# Patient Record
Sex: MALE | Race: WHITE | Employment: OTHER | URBAN - METROPOLITAN AREA
[De-identification: names, ages, dates, MRNs, and addresses within clinical notes are randomized per-mention and may not be internally consistent; named-entity substitution may affect disease eponyms.]

---

## 2018-03-01 ENCOUNTER — HOSPITAL ENCOUNTER (INPATIENT)
Dept: SURGICAL ICU | Age: 78
LOS: 13 days | Discharge: NURSING, REHAB OR LONG-TERM CARE | DRG: 377 | End: 2018-03-14
Attending: EMERGENCY MEDICINE | Admitting: INTERNAL MEDICINE
Payer: MEDICARE

## 2018-03-01 DIAGNOSIS — A41.9 SEPTIC SHOCK (HCC): ICD-10-CM

## 2018-03-01 DIAGNOSIS — E87.20 LACTIC ACIDOSIS: ICD-10-CM

## 2018-03-01 DIAGNOSIS — K62.5 RECTAL BLEEDING: Primary | ICD-10-CM

## 2018-03-01 DIAGNOSIS — J18.9 PNEUMONIA DUE TO ORGANISM: ICD-10-CM

## 2018-03-01 DIAGNOSIS — N17.9 AKI (ACUTE KIDNEY INJURY) (HCC): ICD-10-CM

## 2018-03-01 DIAGNOSIS — R57.1 HYPOVOLEMIC SHOCK (HCC): ICD-10-CM

## 2018-03-01 DIAGNOSIS — R65.21 SEPTIC SHOCK (HCC): ICD-10-CM

## 2018-03-01 LAB
AADO2: 258.4 MMHG
ABO/RH: NORMAL
ALBUMIN SERPL-MCNC: 2.3 G/DL (ref 3.5–5.2)
ALBUMIN SERPL-MCNC: 3.4 G/DL (ref 3.5–5.2)
ALP BLD-CCNC: 45 U/L (ref 40–129)
ALP BLD-CCNC: 58 U/L (ref 40–129)
ALT SERPL-CCNC: 11 U/L (ref 0–40)
ALT SERPL-CCNC: 17 U/L (ref 0–40)
ANION GAP SERPL CALCULATED.3IONS-SCNC: 11 MMOL/L (ref 7–16)
ANION GAP SERPL CALCULATED.3IONS-SCNC: 22 MMOL/L (ref 7–16)
ANISOCYTOSIS: ABNORMAL
ANTIBODY SCREEN: NORMAL
AST SERPL-CCNC: 18 U/L (ref 0–39)
AST SERPL-CCNC: 21 U/L (ref 0–39)
B.E.: -16.7 MMOL/L (ref -3–3)
BACTERIA: ABNORMAL /HPF
BASOPHILS ABSOLUTE: 0.03 E9/L (ref 0–0.2)
BASOPHILS ABSOLUTE: 0.05 E9/L (ref 0–0.2)
BASOPHILS RELATIVE PERCENT: 0.2 % (ref 0–2)
BASOPHILS RELATIVE PERCENT: 0.2 % (ref 0–2)
BETA-HYDROXYBUTYRATE: 0.5 MMOL/L (ref 0.02–0.27)
BILIRUB SERPL-MCNC: 0.3 MG/DL (ref 0–1.2)
BILIRUB SERPL-MCNC: 0.3 MG/DL (ref 0–1.2)
BILIRUBIN URINE: NEGATIVE
BLOOD BANK DISPENSE STATUS: NORMAL
BLOOD BANK PRODUCT CODE: NORMAL
BLOOD, URINE: ABNORMAL
BPU ID: NORMAL
BUN BLDV-MCNC: 86 MG/DL (ref 8–23)
BUN BLDV-MCNC: 94 MG/DL (ref 8–23)
BURR CELLS: ABNORMAL
BURR CELLS: ABNORMAL
CALCIUM SERPL-MCNC: 6.4 MG/DL (ref 8.6–10.2)
CALCIUM SERPL-MCNC: 8.4 MG/DL (ref 8.6–10.2)
CASTS: ABNORMAL /LPF
CHLORIDE BLD-SCNC: 108 MMOL/L (ref 98–107)
CHLORIDE BLD-SCNC: 92 MMOL/L (ref 98–107)
CHLORIDE URINE RANDOM: <20 MMOL/L
CK MB: 6.7 NG/ML (ref 0–7.7)
CLARITY: CLEAR
CO2: 14 MMOL/L (ref 22–29)
CO2: 14 MMOL/L (ref 22–29)
COHB: 0.3 % (ref 0–1.5)
COLOR: YELLOW
CORTISOL TOTAL: 22 MCG/DL (ref 2.68–18.4)
CREAT SERPL-MCNC: 1.8 MG/DL (ref 0.7–1.2)
CREAT SERPL-MCNC: 2.3 MG/DL (ref 0.7–1.2)
CREATININE URINE: 90 MG/DL (ref 40–278)
CRITICAL: ABNORMAL
DATE ANALYZED: ABNORMAL
DATE OF COLLECTION: ABNORMAL
DESCRIPTION BLOOD BANK: NORMAL
EKG ATRIAL RATE: 92 BPM
EKG P AXIS: 78 DEGREES
EKG P-R INTERVAL: 166 MS
EKG Q-T INTERVAL: 328 MS
EKG QRS DURATION: 98 MS
EKG QTC CALCULATION (BAZETT): 405 MS
EKG R AXIS: 36 DEGREES
EKG T AXIS: 111 DEGREES
EKG VENTRICULAR RATE: 92 BPM
EOSINOPHILS ABSOLUTE: 0 E9/L (ref 0.05–0.5)
EOSINOPHILS ABSOLUTE: 0 E9/L (ref 0.05–0.5)
EOSINOPHILS RELATIVE PERCENT: 0 % (ref 0–6)
EOSINOPHILS RELATIVE PERCENT: 0 % (ref 0–6)
FIO2: 100 %
GFR AFRICAN AMERICAN: 34
GFR AFRICAN AMERICAN: 45
GFR NON-AFRICAN AMERICAN: 28 ML/MIN/1.73
GFR NON-AFRICAN AMERICAN: 37 ML/MIN/1.73
GLUCOSE BLD-MCNC: 195 MG/DL (ref 74–109)
GLUCOSE BLD-MCNC: 93 MG/DL (ref 74–109)
GLUCOSE URINE: NEGATIVE MG/DL
HCO3: 14.4 MMOL/L (ref 22–26)
HCT VFR BLD CALC: 33.2 % (ref 37–54)
HCT VFR BLD CALC: 34.9 % (ref 37–54)
HCT VFR BLD CALC: 42.3 % (ref 37–54)
HEMOGLOBIN: 11.3 G/DL (ref 12.5–16.5)
HEMOGLOBIN: 11.5 G/DL (ref 12.5–16.5)
HEMOGLOBIN: 14.1 G/DL (ref 12.5–16.5)
HHB: 0.5 % (ref 0–5)
IMMATURE GRANULOCYTES #: 0.07 E9/L
IMMATURE GRANULOCYTES #: 0.18 E9/L
IMMATURE GRANULOCYTES %: 0.5 % (ref 0–5)
IMMATURE GRANULOCYTES %: 0.7 % (ref 0–5)
INR BLD: 1.7
INR BLD: 1.8
KETONES, URINE: NEGATIVE MG/DL
LAB: ABNORMAL
LACTIC ACID: 0.9 MMOL/L (ref 0.5–2.2)
LACTIC ACID: 2.2 MMOL/L (ref 0.5–2.2)
LACTIC ACID: 5.1 MMOL/L (ref 0.5–2.2)
LEUKOCYTE ESTERASE, URINE: NEGATIVE
LIPASE: 10 U/L (ref 13–60)
LYMPHOCYTES ABSOLUTE: 1.16 E9/L (ref 1.5–4)
LYMPHOCYTES ABSOLUTE: 1.68 E9/L (ref 1.5–4)
LYMPHOCYTES RELATIVE PERCENT: 6.2 % (ref 20–42)
LYMPHOCYTES RELATIVE PERCENT: 7.5 % (ref 20–42)
Lab: 1730
MCH RBC QN AUTO: 31.3 PG (ref 26–35)
MCH RBC QN AUTO: 32.3 PG (ref 26–35)
MCH RBC QN AUTO: 32.8 PG (ref 26–35)
MCHC RBC AUTO-ENTMCNC: 33 % (ref 32–34.5)
MCHC RBC AUTO-ENTMCNC: 33.3 % (ref 32–34.5)
MCHC RBC AUTO-ENTMCNC: 34 % (ref 32–34.5)
MCV RBC AUTO: 94.8 FL (ref 80–99.9)
MCV RBC AUTO: 96.2 FL (ref 80–99.9)
MCV RBC AUTO: 96.8 FL (ref 80–99.9)
METER GLUCOSE: 78 MG/DL (ref 70–110)
METHB: 0.6 % (ref 0–1.5)
MODE: AC
MONOCYTES ABSOLUTE: 0.35 E9/L (ref 0.1–0.95)
MONOCYTES ABSOLUTE: 1.86 E9/L (ref 0.1–0.95)
MONOCYTES RELATIVE PERCENT: 2.3 % (ref 2–12)
MONOCYTES RELATIVE PERCENT: 6.8 % (ref 2–12)
NEUTROPHILS ABSOLUTE: 13.85 E9/L (ref 1.8–7.3)
NEUTROPHILS ABSOLUTE: 23.54 E9/L (ref 1.8–7.3)
NEUTROPHILS RELATIVE PERCENT: 86.1 % (ref 43–80)
NEUTROPHILS RELATIVE PERCENT: 89.5 % (ref 43–80)
NITRITE, URINE: NEGATIVE
O2 CONTENT: 17.9 ML/DL
O2 SATURATION: 99.5 % (ref 92–98.5)
O2HB: 98.6 % (ref 94–97)
OPERATOR ID: ABNORMAL
OVALOCYTES: ABNORMAL
OVALOCYTES: ABNORMAL
PATIENT TEMP: 37 C
PCO2: 57.8 MMHG (ref 35–45)
PDW BLD-RTO: 13 FL (ref 11.5–15)
PDW BLD-RTO: 13.5 FL (ref 11.5–15)
PDW BLD-RTO: 14 FL (ref 11.5–15)
PEEP/CPAP: 5 CMH?O
PFO2: 3.72 MMHG/%
PH BLOOD GAS: 7.01 (ref 7.35–7.45)
PH UA: 6 (ref 5–9)
PLATELET # BLD: 141 E9/L (ref 130–450)
PLATELET # BLD: 157 E9/L (ref 130–450)
PLATELET # BLD: 246 E9/L (ref 130–450)
PMV BLD AUTO: 11.7 FL (ref 7–12)
PMV BLD AUTO: 11.8 FL (ref 7–12)
PMV BLD AUTO: 11.8 FL (ref 7–12)
PO2: 371.8 MMHG (ref 60–100)
POIKILOCYTES: ABNORMAL
POIKILOCYTES: ABNORMAL
POLYCHROMASIA: ABNORMAL
POLYCHROMASIA: ABNORMAL
POTASSIUM SERPL-SCNC: 4.9 MMOL/L (ref 3.5–5)
POTASSIUM SERPL-SCNC: 5.5 MMOL/L (ref 3.5–5)
POTASSIUM, UR: 27.7 MMOL/L
PROTEIN UA: 30 MG/DL
PROTHROMBIN TIME: 19.1 SEC (ref 9.3–12.4)
PROTHROMBIN TIME: 20 SEC (ref 9.3–12.4)
RBC # BLD: 3.45 E12/L (ref 3.8–5.8)
RBC # BLD: 3.68 E12/L (ref 3.8–5.8)
RBC # BLD: 4.37 E12/L (ref 3.8–5.8)
RBC UA: ABNORMAL /HPF (ref 0–2)
RENAL EPITHELIAL, UA: ABNORMAL /HPF
RI(T): 70 %
RR MECHANICAL: 14 B/MIN
SODIUM BLD-SCNC: 128 MMOL/L (ref 132–146)
SODIUM BLD-SCNC: 133 MMOL/L (ref 132–146)
SODIUM URINE: <20 MMOL/L
SOURCE, BLOOD GAS: ABNORMAL
SPECIFIC GRAVITY UA: 1.01 (ref 1–1.03)
THB: 12.2 G/DL (ref 11.5–16.5)
TIME ANALYZED: 1735
TOTAL CK: 263 U/L (ref 20–200)
TOTAL PROTEIN: 4.5 G/DL (ref 6.4–8.3)
TOTAL PROTEIN: 6.6 G/DL (ref 6.4–8.3)
TROPONIN: <0.01 NG/ML (ref 0–0.03)
TROPONIN: <0.01 NG/ML (ref 0–0.03)
UROBILINOGEN, URINE: 0.2 E.U./DL
VT MECHANICAL: 500 ML
WBC # BLD: 15.5 E9/L (ref 4.5–11.5)
WBC # BLD: 21.6 E9/L (ref 4.5–11.5)
WBC # BLD: 27.3 E9/L (ref 4.5–11.5)
WBC UA: ABNORMAL /HPF (ref 0–5)

## 2018-03-01 PROCEDURE — P9035 PLATELET PHERES LEUKOREDUCED: HCPCS

## 2018-03-01 PROCEDURE — 86850 RBC ANTIBODY SCREEN: CPT

## 2018-03-01 PROCEDURE — 82570 ASSAY OF URINE CREATININE: CPT

## 2018-03-01 PROCEDURE — P9016 RBC LEUKOCYTES REDUCED: HCPCS

## 2018-03-01 PROCEDURE — 9990 CHARGE CONVERSION

## 2018-03-01 PROCEDURE — 86900 BLOOD TYPING SEROLOGIC ABO: CPT

## 2018-03-01 PROCEDURE — 99291 CRITICAL CARE FIRST HOUR: CPT | Performed by: SURGERY

## 2018-03-01 PROCEDURE — 81003 URINALYSIS AUTO W/O SCOPE: CPT

## 2018-03-01 PROCEDURE — 87798 DETECT AGENT NOS DNA AMP: CPT

## 2018-03-01 PROCEDURE — 36556 INSERT NON-TUNNEL CV CATH: CPT

## 2018-03-01 PROCEDURE — 80053 COMPREHEN METABOLIC PANEL: CPT

## 2018-03-01 PROCEDURE — 86901 BLOOD TYPING SEROLOGIC RH(D): CPT

## 2018-03-01 PROCEDURE — 84300 ASSAY OF URINE SODIUM: CPT

## 2018-03-01 PROCEDURE — 82436 ASSAY OF URINE CHLORIDE: CPT

## 2018-03-01 PROCEDURE — 84484 ASSAY OF TROPONIN QUANT: CPT

## 2018-03-01 PROCEDURE — 96367 TX/PROPH/DG ADDL SEQ IV INF: CPT

## 2018-03-01 PROCEDURE — 05HN33Z INSERTION OF INFUSION DEVICE INTO LEFT INTERNAL JUGULAR VEIN, PERCUTANEOUS APPROACH: ICD-10-PCS | Performed by: INTERNAL MEDICINE

## 2018-03-01 PROCEDURE — 82550 ASSAY OF CK (CPK): CPT

## 2018-03-01 PROCEDURE — 82962 GLUCOSE BLOOD TEST: CPT

## 2018-03-01 PROCEDURE — 86920 COMPATIBILITY TEST SPIN: CPT

## 2018-03-01 PROCEDURE — 94799 UNLISTED PULMONARY SVC/PX: CPT

## 2018-03-01 PROCEDURE — 82533 TOTAL CORTISOL: CPT

## 2018-03-01 PROCEDURE — 94002 VENT MGMT INPAT INIT DAY: CPT

## 2018-03-01 PROCEDURE — 87486 CHLMYD PNEUM DNA AMP PROBE: CPT

## 2018-03-01 PROCEDURE — 74176 CT ABD & PELVIS W/O CONTRAST: CPT

## 2018-03-01 PROCEDURE — 84133 ASSAY OF URINE POTASSIUM: CPT

## 2018-03-01 PROCEDURE — 85025 COMPLETE CBC W/AUTO DIFF WBC: CPT

## 2018-03-01 PROCEDURE — 96365 THER/PROPH/DIAG IV INF INIT: CPT

## 2018-03-01 PROCEDURE — C9113 INJ PANTOPRAZOLE SODIUM, VIA: HCPCS

## 2018-03-01 PROCEDURE — 71045 X-RAY EXAM CHEST 1 VIEW: CPT

## 2018-03-01 PROCEDURE — 36430 TRANSFUSION BLD/BLD COMPNT: CPT

## 2018-03-01 PROCEDURE — 85610 PROTHROMBIN TIME: CPT

## 2018-03-01 PROCEDURE — 87502 INFLUENZA DNA AMP PROBE: CPT

## 2018-03-01 PROCEDURE — 87581 M.PNEUMON DNA AMP PROBE: CPT

## 2018-03-01 PROCEDURE — 96375 TX/PRO/DX INJ NEW DRUG ADDON: CPT

## 2018-03-01 PROCEDURE — P9059 PLASMA, FRZ BETWEEN 8-24HOUR: HCPCS

## 2018-03-01 PROCEDURE — 99285 EMERGENCY DEPT VISIT HI MDM: CPT

## 2018-03-01 PROCEDURE — 85027 COMPLETE CBC AUTOMATED: CPT

## 2018-03-01 PROCEDURE — 96366 THER/PROPH/DIAG IV INF ADDON: CPT

## 2018-03-01 PROCEDURE — 82805 BLOOD GASES W/O2 SATURATION: CPT

## 2018-03-01 PROCEDURE — 87503 INFLUENZA DNA AMP PROB ADDL: CPT

## 2018-03-01 PROCEDURE — 36415 COLL VENOUS BLD VENIPUNCTURE: CPT

## 2018-03-01 PROCEDURE — 93005 ELECTROCARDIOGRAM TRACING: CPT

## 2018-03-01 PROCEDURE — 81015 MICROSCOPIC EXAM OF URINE: CPT

## 2018-03-01 PROCEDURE — 5A1955Z RESPIRATORY VENTILATION, GREATER THAN 96 CONSECUTIVE HOURS: ICD-10-PCS | Performed by: EMERGENCY MEDICINE

## 2018-03-01 PROCEDURE — 83605 ASSAY OF LACTIC ACID: CPT

## 2018-03-01 PROCEDURE — 94760 N-INVAS EAR/PLS OXIMETRY 1: CPT

## 2018-03-01 PROCEDURE — 0BH17EZ INSERTION OF ENDOTRACHEAL AIRWAY INTO TRACHEA, VIA NATURAL OR ARTIFICIAL OPENING: ICD-10-PCS | Performed by: EMERGENCY MEDICINE

## 2018-03-01 PROCEDURE — 82553 CREATINE MB FRACTION: CPT

## 2018-03-01 PROCEDURE — 87088 URINE BACTERIA CULTURE: CPT

## 2018-03-01 PROCEDURE — 31500 INSERT EMERGENCY AIRWAY: CPT

## 2018-03-01 PROCEDURE — 82010 KETONE BODYS QUAN: CPT

## 2018-03-01 PROCEDURE — 83690 ASSAY OF LIPASE: CPT

## 2018-03-01 PROCEDURE — 87040 BLOOD CULTURE FOR BACTERIA: CPT

## 2018-03-01 RX ORDER — PANTOPRAZOLE SODIUM 40 MG/10ML
40 INJECTION, POWDER, LYOPHILIZED, FOR SOLUTION INTRAVENOUS ONCE
Status: COMPLETED | OUTPATIENT
Start: 2018-03-01 | End: 2018-03-01

## 2018-03-01 RX ORDER — 0.9 % SODIUM CHLORIDE 0.9 %
1000 INTRAVENOUS SOLUTION INTRAVENOUS ONCE
Status: COMPLETED | OUTPATIENT
Start: 2018-03-01 | End: 2018-03-01

## 2018-03-01 RX ORDER — SODIUM CHLORIDE 0.9 % (FLUSH) 0.9 %
10 SYRINGE (ML) INJECTION EVERY 12 HOURS SCHEDULED
Status: DISCONTINUED | OUTPATIENT
Start: 2018-03-01 | End: 2018-03-14 | Stop reason: HOSPADM

## 2018-03-01 RX ORDER — ETOMIDATE 2 MG/ML
INJECTION INTRAVENOUS
Status: COMPLETED
Start: 2018-03-01 | End: 2018-03-01

## 2018-03-01 RX ORDER — METOPROLOL TARTRATE 50 MG/1
1 TABLET, FILM COATED ORAL 2 TIMES DAILY
COMMUNITY
Start: 2018-02-15

## 2018-03-01 RX ORDER — SODIUM CHLORIDE 9 MG/ML
INJECTION, SOLUTION INTRAVENOUS CONTINUOUS
Status: DISCONTINUED | OUTPATIENT
Start: 2018-03-01 | End: 2018-03-04

## 2018-03-01 RX ORDER — 0.9 % SODIUM CHLORIDE 0.9 %
10 VIAL (ML) INJECTION ONCE
Status: DISCONTINUED | OUTPATIENT
Start: 2018-03-01 | End: 2018-03-01 | Stop reason: SDUPTHER

## 2018-03-01 RX ORDER — MIDAZOLAM HYDROCHLORIDE 1 MG/ML
INJECTION INTRAMUSCULAR; INTRAVENOUS
Status: COMPLETED
Start: 2018-03-01 | End: 2018-03-01

## 2018-03-01 RX ORDER — SIMVASTATIN 80 MG
1 TABLET ORAL DAILY
COMMUNITY
Start: 2018-02-13

## 2018-03-01 RX ORDER — FENTANYL CITRATE 50 UG/ML
100 INJECTION, SOLUTION INTRAMUSCULAR; INTRAVENOUS ONCE
Status: COMPLETED | OUTPATIENT
Start: 2018-03-01 | End: 2018-03-01

## 2018-03-01 RX ORDER — ISOSORBIDE MONONITRATE 60 MG/1
1 TABLET, EXTENDED RELEASE ORAL DAILY
COMMUNITY
Start: 2018-02-08

## 2018-03-01 RX ORDER — MIDAZOLAM HYDROCHLORIDE 1 MG/ML
2 INJECTION INTRAMUSCULAR; INTRAVENOUS ONCE
Status: COMPLETED | OUTPATIENT
Start: 2018-03-01 | End: 2018-03-01

## 2018-03-01 RX ORDER — LISINOPRIL 5 MG/1
1 TABLET ORAL DAILY
COMMUNITY
Start: 2018-02-08

## 2018-03-01 RX ORDER — 0.9 % SODIUM CHLORIDE 0.9 %
250 INTRAVENOUS SOLUTION INTRAVENOUS ONCE
Status: DISCONTINUED | OUTPATIENT
Start: 2018-03-01 | End: 2018-03-02 | Stop reason: SDUPTHER

## 2018-03-01 RX ORDER — SODIUM CHLORIDE 0.9 % (FLUSH) 0.9 %
10 SYRINGE (ML) INJECTION PRN
Status: DISCONTINUED | OUTPATIENT
Start: 2018-03-01 | End: 2018-03-02 | Stop reason: ALTCHOICE

## 2018-03-01 RX ORDER — ONDANSETRON 2 MG/ML
4 INJECTION INTRAMUSCULAR; INTRAVENOUS ONCE
Status: COMPLETED | OUTPATIENT
Start: 2018-03-01 | End: 2018-03-01

## 2018-03-01 RX ORDER — ACETAMINOPHEN 160 MG
1 TABLET,DISINTEGRATING ORAL DAILY
COMMUNITY
End: 2018-04-12 | Stop reason: CLARIF

## 2018-03-01 RX ORDER — PANTOPRAZOLE SODIUM 40 MG/10ML
40 INJECTION, POWDER, LYOPHILIZED, FOR SOLUTION INTRAVENOUS 2 TIMES DAILY
Status: DISCONTINUED | OUTPATIENT
Start: 2018-03-02 | End: 2018-03-02

## 2018-03-01 RX ORDER — FENTANYL CITRATE 50 UG/ML
INJECTION, SOLUTION INTRAMUSCULAR; INTRAVENOUS
Status: COMPLETED
Start: 2018-03-01 | End: 2018-03-01

## 2018-03-01 RX ORDER — 0.9 % SODIUM CHLORIDE 0.9 %
500 INTRAVENOUS SOLUTION INTRAVENOUS ONCE
Status: COMPLETED | OUTPATIENT
Start: 2018-03-01 | End: 2018-03-01

## 2018-03-01 RX ORDER — SUCCINYLCHOLINE CHLORIDE 20 MG/ML
INJECTION INTRAMUSCULAR; INTRAVENOUS
Status: COMPLETED
Start: 2018-03-01 | End: 2018-03-01

## 2018-03-01 RX ORDER — PIPERACILLIN SODIUM, TAZOBACTAM SODIUM 3; .375 G/15ML; G/15ML
3.38 INJECTION, POWDER, LYOPHILIZED, FOR SOLUTION INTRAVENOUS EVERY 12 HOURS
Status: DISCONTINUED | OUTPATIENT
Start: 2018-03-02 | End: 2018-03-03

## 2018-03-01 RX ORDER — ACETAMINOPHEN 325 MG/1
650 TABLET ORAL EVERY 4 HOURS PRN
Status: DISCONTINUED | OUTPATIENT
Start: 2018-03-01 | End: 2018-03-14 | Stop reason: HOSPADM

## 2018-03-01 RX ORDER — PANTOPRAZOLE SODIUM 40 MG/10ML
80 INJECTION, POWDER, LYOPHILIZED, FOR SOLUTION INTRAVENOUS ONCE
Status: DISCONTINUED | OUTPATIENT
Start: 2018-03-01 | End: 2018-03-01 | Stop reason: SDUPTHER

## 2018-03-01 RX ORDER — ONDANSETRON 2 MG/ML
4 INJECTION INTRAMUSCULAR; INTRAVENOUS EVERY 6 HOURS PRN
Status: DISCONTINUED | OUTPATIENT
Start: 2018-03-01 | End: 2018-03-14 | Stop reason: HOSPADM

## 2018-03-01 RX ORDER — FENTANYL CITRATE 50 UG/ML
INJECTION, SOLUTION INTRAMUSCULAR; INTRAVENOUS
Status: DISPENSED
Start: 2018-03-01 | End: 2018-03-02

## 2018-03-01 RX ORDER — LIDOCAINE HYDROCHLORIDE 10 MG/ML
INJECTION, SOLUTION INFILTRATION; PERINEURAL
Status: COMPLETED
Start: 2018-03-01 | End: 2018-03-02

## 2018-03-01 RX ORDER — ACETAMINOPHEN 325 MG/1
650 TABLET ORAL EVERY 4 HOURS PRN
Status: DISCONTINUED | OUTPATIENT
Start: 2018-03-01 | End: 2018-03-01 | Stop reason: SDUPTHER

## 2018-03-01 RX ORDER — ALBUTEROL SULFATE 2.5 MG/3ML
3 SOLUTION RESPIRATORY (INHALATION) 4 TIMES DAILY PRN
COMMUNITY
Start: 2018-02-17

## 2018-03-01 RX ORDER — 0.9 % SODIUM CHLORIDE 0.9 %
1000 INTRAVENOUS SOLUTION INTRAVENOUS ONCE
Status: DISCONTINUED | OUTPATIENT
Start: 2018-03-01 | End: 2018-03-02 | Stop reason: SDUPTHER

## 2018-03-01 RX ORDER — 0.9 % SODIUM CHLORIDE 0.9 %
10 VIAL (ML) INJECTION 2 TIMES DAILY
Status: DISCONTINUED | OUTPATIENT
Start: 2018-03-01 | End: 2018-03-02

## 2018-03-01 RX ADMIN — MIDAZOLAM HYDROCHLORIDE 2 MG: 1 INJECTION INTRAMUSCULAR; INTRAVENOUS at 16:09

## 2018-03-01 RX ADMIN — ONDANSETRON 4 MG: 2 INJECTION INTRAMUSCULAR; INTRAVENOUS at 11:49

## 2018-03-01 RX ADMIN — SODIUM CHLORIDE: 9 INJECTION, SOLUTION INTRAVENOUS at 21:59

## 2018-03-01 RX ADMIN — ETOMIDATE 20 MG: 2 INJECTION INTRAVENOUS at 15:47

## 2018-03-01 RX ADMIN — MIDAZOLAM HYDROCHLORIDE 2 MG: 1 INJECTION INTRAMUSCULAR; INTRAVENOUS at 15:52

## 2018-03-01 RX ADMIN — MIDAZOLAM HYDROCHLORIDE 2 MG: 1 INJECTION INTRAMUSCULAR; INTRAVENOUS at 16:05

## 2018-03-01 RX ADMIN — FENTANYL CITRATE 100 MCG: 50 INJECTION, SOLUTION INTRAMUSCULAR; INTRAVENOUS at 16:13

## 2018-03-01 RX ADMIN — MIDAZOLAM HYDROCHLORIDE 4 MG: 1 INJECTION INTRAMUSCULAR; INTRAVENOUS at 16:16

## 2018-03-01 RX ADMIN — Medication 1000 ML: at 11:49

## 2018-03-01 RX ADMIN — Medication 10 ML: at 21:48

## 2018-03-01 RX ADMIN — Medication 500 ML: at 13:54

## 2018-03-01 RX ADMIN — FENTANYL CITRATE 100 MCG: 50 INJECTION, SOLUTION INTRAMUSCULAR; INTRAVENOUS at 16:48

## 2018-03-01 RX ADMIN — PANTOPRAZOLE SODIUM 40 MG: 40 INJECTION, POWDER, LYOPHILIZED, FOR SOLUTION INTRAVENOUS at 11:49

## 2018-03-01 RX ADMIN — SUCCINYLCHOLINE CHLORIDE 100 MG: 20 INJECTION INTRAMUSCULAR; INTRAVENOUS at 15:48

## 2018-03-01 RX ADMIN — Medication 1000 ML: at 11:59

## 2018-03-01 ASSESSMENT — PAIN SCALES - GENERAL
PAINLEVEL_OUTOF10: 0
PAINLEVEL_OUTOF10: 0

## 2018-03-01 ASSESSMENT — PULMONARY FUNCTION TESTS
PIF_VALUE: 40
PIF_VALUE: 34
PIF_VALUE: 34
PIF_VALUE: 42
PIF_VALUE: 51
PIF_VALUE: 35
PIF_VALUE: 22
PIF_VALUE: 30
PIF_VALUE: 32

## 2018-03-01 NOTE — PROCEDURES
Intubation Procedure Note    Indication: impending airway compromise    Consent: Unable to be obtained due to the emergent nature of this procedure. Medications Used: etomidate intravenously    Procedure: The patient was placed in the appropriate position. Cricoid pressure was not required. Intubation was performed by direct laryngoscopy using a laryngoscope and an 7.5 cuffed endotracheal tube. The cuff was then inflated and the tube was secured appropriately at a distance of 22 cm to the lips. Initial confirmation of placement included bilateral breath sounds, an end tidal CO2 detector, absence of sounds over the stomach, tube fogging, adequate chest rise and adequate pulse oximetry reading. A chest x-ray to verify correct placement of the tube showed appropriate tube position. The patient tolerated the procedure well.      Complications: None

## 2018-03-01 NOTE — ED NOTES
005 [facility] Marlin Nelson RN  03/01/18 3250

## 2018-03-01 NOTE — ED NOTES
PT STILL MOVING DURING CVC PLACEMENT. DR Henson Fought AT BEDSIDE.  ADDITIONAL 2MG VERSED BOLUS ADMINISTERED VIA DRIP     Jed Wolf RN  03/01/18 3618

## 2018-03-01 NOTE — ED PROVIDER NOTES
ED Attending  CC: Yes     Department of Emergency Medicine   ED  Provider Note  Admit Date/RoomTime: 3/1/2018  6:17 PM  ED [facility]   Chief Complaint   Rectal Bleeding (started last night, multiple times t/o night. passing clots) and Loss of Consciousness (wife states that he passed out in the bathroom ths am)    History of Present Illness   Source of history provided by:  patient and spouse/SO. History/Exam Limitations: none. Musa Valero is a 69 y.o. old male who  has no past medical history on file. , presents to the emergency department by private vehicle, for possible GI bleeding as evidenced by bright red blood per rectum and clots, which began 5 hour(s) prior to arrival.  Symptoms are associated with Syncopal episode several hours ago. Since onset the symptoms have been intermittent and moderate in severity. If Vomiting, Then:     []  Bright Red Blood by Emesis     []  Streaking of Blood     []  Coffee Ground Appearance        If Stools, Then:    [x]  Bright Red Blood     []  Streaking of Blood     []  [place] / [place] Kate     []  Blood on Tissue Paper     Recent Use of:     []  Ethanol Use     []  NSAID's Use     []  Steroid Use   History of:    []  GI Bleeding     []  Peptic Ulcer Disease     []  Esophageal Varicies     []  Liver Disease       []  Aortic Graft Surgery     []  Coagulopathy     []  Current Anticoagulant Use     ROS    Pertinent positives and negatives are stated within HPI, all other systems reviewed and are negative. No past surgical history on file. Social History:    Family History: family history is not on file. Allergies: Patient has no known allergies. Physical Exam           ED Triage Vitals [03/01/18 1058]   BP Temp Temp Source Pulse Resp SpO2 Height Weight   -- 96.8 °F (36 °C) Temporal 94 16 93 % -- 185 lb (83.9 kg)      Oxygen Saturation Interpretation: Normal.    Constitutional:  Alert, development consistent with age.   Eyes:  PERRL, EOMI, no discharge or Influenza A Virus H3       Result: Not Detected  *  *  Normal Range: Not Detected      Film Array Metapneumovirus       Result: Not Detected  *  *  Normal Range: Not Detected      Film Array Parainfluenza Virus 1       Result: Not Detected  *  *  Normal Range: Not Detected      Film Array Parainfluenza Virus 2       Result: Not Detected  *  *  Normal Range: Not Detected      Film Array Parainfluenza Virus 3       Result: Not Detected  *  *  Normal Range: Not Detected      Film Array Parainfluenza Virus 4       Result: Not Detected  *  *  Normal Range: Not Detected      Film Array Respiratory Syncitial Virus       Result: Not Detected  *  *  Normal Range: Not Detected      Film Array Rhinovirus/Enterovirus       Result: Not Detected  *  *  Normal Range: Not Detected      Film Array Bordetella Pertusis       Result: Not Detected  *  *  Normal Range: Not Detected      Film Array Chlamydophilia Pneumoniae       Result: Not Detected  *  *  Normal Range: Not Detected      Film Array Mycoplasma Pneumoniae       Result: Not Detected  *  *  Normal Range: Not Detected      Organism FILM ARR Influenza B Virus Detected (A)    CBC Auto Differential   Result Value Ref Range    WBC 27.3 (H) 4.5 - 11.5 E9/L    RBC 4.37 3.80 - 5.80 E12/L    Hemoglobin 14.1 12.5 - 16.5 g/dL    Hematocrit 42.3 37.0 - 54.0 %    MCV 96.8 80.0 - 99.9 fL    MCH 32.3 26.0 - 35.0 pg    MCHC 33.3 32.0 - 34.5 %    RDW 13.0 11.5 - 15.0 fL    Platelets 167 389 - 367 E9/L    MPV 11.8 7.0 - 12.0 fL    Neutrophils % 86.1 (H) 43.0 - 80.0 %    Immature Granulocytes % 0.7 0.0 - 5.0 %    Lymphocytes % 6.2 (L) 20.0 - 42.0 %    Monocytes % 6.8 2.0 - 12.0 %    Eosinophils % 0.0 0.0 - 6.0 %    Basophils % 0.2 0.0 - 2.0 %    Neutrophils # 23.54 (H) 1.80 - 7.30 E9/L    Immature Granulocytes # 0.18 E9/L    Lymphocytes # 1.68 1.50 - 4.00 E9/L    Monocytes # 1.86 (H) 0.10 - 0.95 E9/L    Eosinophils # 0.00 (L) 0.05 - 0.50 E9/L    Basophils # 0.05 0.00 - 0.20 E9/L Poikilocytes 2+     Rod Cells 2+     Ovalocytes 1+    Blood Gas, Arterial   Result Value Ref Range    Date Analyzed [id number]     Time Analyzed 1735     Source: Blood Arterial     pH, Blood Gas 7.014 (LL) 7.350 - 7.450    PCO2 57.8 (H) 35.0 - 45.0 mmHg    PO2 371.8 (H) 60.0 - 100.0 mmHg    HCO3 14.4 (L) 22.0 - 26.0 mmol/L    B.E. -16.7 (L) -3.0 - 3.0 mmol/L    O2 Sat 99.5 (H) 92.0 - 98.5 %    PO2/FIO2 3.72 mmHg/%    AaDO2 258.4 mmHg    RI(T) 70 %    O2Hb 98.6 (H) 94.0 - 97.0 %    COHb 0.3 0.0 - 1.5 %    MetHb 0.6 0.0 - 1.5 %    O2 Content 17.9 mL/dL    HHb 0.5 0.0 - 5.0 %    tHb (est) 12.2 11.5 - 16.5 g/dL    Mode AC     FIO2 100.0 %    Rr Mechanical 14.0 b/min    Vt Mechanical 500 mL    Peep/Cpap 5.0 cmH? O    Date Of Collection [id number]     Time Collected 1730     Pt Temp 37.0 C     ID [id number]     Lab [id number]     Critical(s) Notified Handed report to Dr/RN    Magnesium   Result Value Ref Range    Magnesium 2.1 1.6 - 2.6 mg/dL   Phosphorus   Result Value Ref Range    Phosphorus 3.1 2.5 - 4.5 mg/dL   Comprehensive Metabolic Panel w/ Reflex to MG   Result Value Ref Range    Sodium 138 132 - 146 mmol/L    Potassium reflex Magnesium 4.7 3.5 - 5.0 mmol/L    Chloride 112 (H) 98 - 107 mmol/L    CO2 15 (L) 22 - 29 mmol/L    Anion Gap 11 7 - 16 mmol/L    Glucose 117 (H) 74 - 109 mg/dL    BUN 72 (H) 8 - 23 mg/dL    CREATININE 1.3 (H) 0.7 - 1.2 mg/dL    GFR Non-African American 54 >=60 mL/min/1.73    GFR African American >60     Calcium 6.3 (L) 8.6 - 10.2 mg/dL    Total Protein 3.7 (L) 6.4 - 8.3 g/dL    Alb 2.1 (L) 3.5 - 5.2 g/dL    Total Bilirubin 0.5 0.0 - 1.2 mg/dL    Alkaline Phosphatase 31 (L) 40 - 129 U/L    ALT 12 0 - 40 U/L    AST 18 0 - 39 U/L   CBC auto differential   Result Value Ref Range    WBC 15.9 (H) 4.5 - 11.5 E9/L    RBC 2.49 (L) 3.80 - 5.80 E12/L    Hemoglobin 7.6 (L) 12.5 - 16.5 g/dL    Hematocrit 23.3 (L) 37.0 - 54.0 %    MCV 93.6 80.0 - 99.9 fL    MCH 30.5 26.0 - 35.0 pg    MCHC 32.6 32.0 - 34.5 % RDW 14.6 11.5 - 15.0 fL    Platelets 159 (L) 902 - 450 E9/L    MPV 11.5 7.0 - 12.0 fL    Neutrophils % 73.0 43.0 - 80.0 %    Immature Granulocytes % 0.5 0.0 - 5.0 %    Lymphocytes % 17.0 (L) 20.0 - 42.0 %    Monocytes % 9.4 2.0 - 12.0 %    Eosinophils % 0.0 0.0 - 6.0 %    Basophils % 0.1 0.0 - 2.0 %    Neutrophils # 11.64 (H) 1.80 - 7.30 E9/L    Immature Granulocytes # 0.08 E9/L    Lymphocytes # 2.70 1.50 - 4.00 E9/L    Monocytes # 1.49 (H) 0.10 - 0.95 E9/L    Eosinophils # 0.00 (L) 0.05 - 0.50 E9/L    Basophils # 0.01 0.00 - 0.20 E9/L    Anisocytosis 1+     Poikilocytes 1+     [place]offey Cells 1+    Protime-INR   Result Value Ref Range    Protime 20.0 (H) 9.3 - 12.4 sec    INR 1.8    Lactic acid, plasma   Result Value Ref Range    Lactic Acid 0.9 0.5 - 2.2 mmol/L   Comprehensive Metabolic Panel   Result Value Ref Range    Sodium 133 132 - 146 mmol/L    Potassium 4.9 3.5 - 5.0 mmol/L    Chloride 108 (H) 98 - 107 mmol/L    CO2 14 (L) 22 - 29 mmol/L    Anion Gap 11 7 - 16 mmol/L    Glucose 93 74 - 109 mg/dL    BUN 86 (H) 8 - 23 mg/dL    CREATININE 1.8 (H) 0.7 - 1.2 mg/dL    GFR Non-African American 37 >=60 mL/min/1.73    GFR African American 45     Calcium 6.4 (L) 8.6 - 10.2 mg/dL    Total Protein 4.5 (L) 6.4 - 8.3 g/dL    Alb 2.3 (L) 3.5 - 5.2 g/dL    Total Bilirubin 0.3 0.0 - 1.2 mg/dL    Alkaline Phosphatase 45 40 - 129 U/L    ALT 11 0 - 40 U/L    AST 18 0 - 39 U/L   Cortisol   Result Value Ref Range    Cortisol 22.00 (H) 2.68 - 18.40 mcg/dL   Urinalysis   Result Value Ref Range    Color, UA Yellow Straw/Yellow    Clarity, UA Clear Clear    Glucose, Ur Negative Negative mg/dL    Bilirubin Urine Negative Negative    Ketones, Urine Negative Negative mg/dL    Specific Gravity, UA 1.015 1.005 - 1.030    Blood, Urine LARGE (A) Negative    pH, UA 6.0 5.0 - 9.0    Protein, UA 30 (A) Negative mg/dL    Urobilinogen, Urine 0.2 <2.0 E.U./dL    Nitrite, Urine Negative Negative    Leukocyte Esterase, Urine Negative Negative (est) 10.1 (L) 11.5 - 16.5 g/dL    Mode AC     FIO2 60.0 %    Rr Mechanical 25.0 b/min    Vt Mechanical 550 mL    Peep/Cpap 5.0 cmH? O    Date Of Collection [id number]     Time Collected 0110     Pt Temp 37.0 C     ID [id number]     Lab [id number]     Critical(s) Notified . No Critical Values    Protime-INR   Result Value Ref Range    Protime 17.7 (H) 9.3 - 12.4 sec    INR 1.6    TEG lab test   Result Value Ref Range    R (Reaction Time) 4.3 (L) 5.0 - 10.0 min    K (Clotting Time) 1.4 1.0 - 3.0 min    Angle (Clot Strength) 70.5 59.0 - 74.0 degree    MA (Max Amplitude) 63.2 50.0 - 70.0 mm    G-TEG 8.6 4.5 - 11.0 K d/sc    EPL-TEG 0.2 0.0 - 15.0 %    LY30 (Fibrinolysis) 0.2 0.0 - 8.0 %   Fibrinogen   Result Value Ref Range    Fibrinogen 357 225 - 540 mg/dL   D-dimer, quantitative   Result Value Ref Range    D-Dimer, Quant 2963 ng/mL DDU   Haptoglobin   Result Value Ref Range    Haptoglobin 82 30 - 200 mg/dL   Blood Gas, Arterial   Result Value Ref Range    Date Analyzed [id number]     Time Analyzed 0645     Source: Blood Arterial     pH, Blood Gas 7.245 (L) 7.350 - 7.450    PCO2 36.9 35.0 - 45.0 mmHg    PO2 115.8 (H) 60.0 - 100.0 mmHg    HCO3 15.6 (L) 22.0 - 26.0 mmol/L    B.E. -10.8 (L) -3.0 - 3.0 mmol/L    O2 Sat 97.9 92.0 - 98.5 %    PO2/FIO2 2.89 mmHg/%    AaDO2 117.0 mmHg    RI(T) 1.01     O2Hb 97.3 (H) 94.0 - 97.0 %    COHb 0.3 0.0 - 1.5 %    MetHb 0.3 0.0 - 1.5 %    O2 Content 12.1 mL/dL    HHb 2.1 0.0 - 5.0 %    tHb (est) 8.7 (L) 11.5 - 16.5 g/dL    Mode AC     FIO2 40.0 %    Rr Mechanical 25.0 b/min    Vt Mechanical 550 mL    Peep/Cpap 5.0 cmH? O    Date Of Collection [id number]     Time Collected 0642     Pt Temp 37.0 C     ID [id number]     Lab [id number]     Critical(s) Notified .  No Critical Values    Blood Gas, Arterial   Result Value Ref Range    Date Analyzed [id number]     Time Analyzed 0658     Source: Blood Arterial     pH, Blood Gas 7.234 (L) 7.350 - 7.450    PCO2 33.5 (L) 35.0 - 45.0 mmHg    PO2 139.9 (H) 60.0 - 100.0 mmHg    HCO3 13.8 (L) 22.0 - 26.0 mmol/L    B.E. -12.6 (L) -3.0 - 3.0 mmol/L    O2 Sat 98.5 92.0 - 98.5 %    PO2/FIO2 3.50 mmHg/%    AaDO2 96.8 mmHg    RI(T) 0.69     O2Hb 97.8 (H) 94.0 - 97.0 %    COHb 0.3 0.0 - 1.5 %    MetHb 0.4 0.0 - 1.5 %    O2 Content 12.8 mL/dL    HHb 1.5 0.0 - 5.0 %    tHb (est) 9.1 (L) 11.5 - 16.5 g/dL    Mode AC     FIO2 40.0 %    Rr Mechanical 25.0 b/min    Vt Mechanical 550 mL    Peep/Cpap 5.0 cmH? O    Date Of Collection [id number]     Time Collected 0655     Pt Temp 37.0 C     ID [id number]     Lab [id number]     Critical(s) Notified .  No Critical Values    CBC Auto Differential   Result Value Ref Range    WBC 13.0 (H) 4.5 - 11.5 E9/L    RBC 2.71 (L) 3.80 - 5.80 E12/L    Hemoglobin 8.6 (L) 12.5 - 16.5 g/dL    Hematocrit 25.0 (L) 37.0 - 54.0 %    MCV 92.3 80.0 - 99.9 fL    MCH 31.7 26.0 - 35.0 pg    MCHC 34.4 32.0 - 34.5 %    RDW 14.6 11.5 - 15.0 fL    Platelets 93 (L) 033 - 450 E9/L    MPV 11.8 7.0 - 12.0 fL    Neutrophils % 76.0 43.0 - 80.0 %    Immature Granulocytes % 0.5 0.0 - 5.0 %    Lymphocytes % 14.9 (L) 20.0 - 42.0 %    Monocytes % 8.6 2.0 - 12.0 %    Eosinophils % 0.0 0.0 - 6.0 %    Basophils % 0.0 0.0 - 2.0 %    Neutrophils # 9.84 (H) 1.80 - 7.30 E9/L    Immature Granulocytes # 0.07 E9/L    Lymphocytes # 1.93 1.50 - 4.00 E9/L    Monocytes # 1.12 (H) 0.10 - 0.95 E9/L    Eosinophils # 0.00 (L) 0.05 - 0.50 E9/L    Basophils # 0.00 0.00 - 0.20 E9/L    Anisocytosis 1+     Poikilocytes 1+     [place] Cells 1+    Platelet Confirmation   Result Value Ref Range    Platelet Confirmation CONFIRMED    Hemoglobin and hematocrit, blood   Result Value Ref Range    Hemoglobin 8.7 (L) 12.5 - 16.5 g/dL    Hematocrit 25.2 (L) 37.0 - 54.0 %   Protime-INR   Result Value Ref Range    Protime 15.8 (H) 9.3 - 12.4 sec    INR 1.4    Blood Gas, Arterial   Result Value Ref Range    Date Analyzed [id number]     Time Analyzed 0824     Source: Blood Arterial     pH, Blood Gas 7.280 (L) 7.350 - 7.450    PCO2 Bank [id number]     Description Blood Bank Plasma, 5 Day, Thawed     Unit Number [id number]     Dispense Status Blood Bank transfused    PREPARE FRESH FROZEN PLASMA Number of Units: 1   Result Value Ref Range    Product Code Blood Bank [id number]     Description Blood Bank Plasma, 5 Day, Thawed     Unit Number [id number]     Dispense Status Blood Bank issued      Imaging: All Radiology results interpreted by Radiologist unless otherwise noted. XR CHEST PORTABLE   Final Result   1. Left internal jugular central venous catheter tip in the SVC. 2. No pneumothorax on the right or left. 3. Endotracheal tube in good position. 4. NG tube in good position. 5. Discrete subpleural areas of atelectasis or increased density in   the posterior costophrenic sulcus of both lower lobes. 6. No perihilar vascular congestion. XR Chest Abdomen Ng Placement   Final Result   NG tube in good position in the fundal region of the stomach, although   partially coiled. XR CHEST PORTABLE   Final Result   1. Vague, ill-defined groundglass opacity towards the right parahilar   region peripherally located. Can represent a pulmonary nodule. Further   evaluation with a CT scan of the chest, which can be done initially   without IV contrast, is recommended. 2. No superimposed acute cardiopulmonary process. 3. Good position for the endotracheal tube. 4. There is no pneumothorax on the right or on the left. ALERT:  THIS IS AN ABNORMAL REPORT. XR CHEST PORTABLE   Final Result   1. Endotracheal tube in good position just proximal to the upper   contour of the arch of the aorta. 2. NG tube in good position. 3. No acute cardiopulmonary process. XR Chest Abdomen Ng Placement   Final Result   Nonspecific bowel gas pattern with a OG tube in the stomach. CT ABDOMEN PELVIS WO CONTRAST Additional Contrast? None   Final Result      1. Motion artifacts.  Suboptimally infusion ( Intravenous Rate/Dose Change 3/2/18 0800)   oseltamivir (TAMIFLU) capsule 30 mg (30 mg Oral Not Given 3/2/18 0815)   octreotide (SANDOSTATIN) 500 mcg in sodium chloride 0.9 % 100 mL infusion (25 mcg/hr Intravenous New Bag 3/2/18 0656)   fentaNYL 5 mcg/mL in D5W 250 mL infusion (100 mcg/hr Intravenous Given 3/2/18 0718)   cisatracurium besylate (NIMBEX) 200 mg in sodium chloride 0.9 % 100 mL infusion (2 mcg/kg/min × 89.2 kg Intravenous New Bag 3/2/18 0827)   calcium gluconate 2 g in dextrose 5 % 50 mL IVPB (not administered)   lubrifresh P.M. (artificial tears) ophthalmic ointment (not administered)   chlorhexidine (PERIDEX) 0.12 % solution 15 mL (not administered)   0.9 % sodium chloride bolus (0 mLs Intravenous Stopped 3/1/18 1355)   pantoprazole (PROTONIX) injection 40 mg (40 mg Intravenous Given 3/1/18 1149)   ondansetron (ZOFRAN) injection 4 mg (4 mg Intravenous Given 3/1/18 1149)   0.9 % sodium chloride bolus (0 mLs Intravenous Stopped 3/1/18 1355)   piperacillin-tazobactam (ZOSYN) 3.375 g in dextrose 5 % 100 mL IVPB (mini-bag) (0 g Intravenous Stopped 3/1/18 1530)   0.9 % sodium chloride bolus (0 mLs Intravenous Stopped 3/1/18 1705)   vancomycin (VANCOCIN) 1,250 mg in sodium chloride 0.9 % 250 mL IVPB (0 mg Intravenous Stopped 3/1/18 2000)   etomidate (AMIDATE) 2 MG/ML injection (20 mg Intravenous Given 3/1/18 1547)   succinylcholine (ANECTINE) 20 MG/ML injection (100 mg Intravenous Given 3/1/18 1548)   midazolam (VERSED) 2 MG/2ML injection (2 mg Intravenous Given 3/1/18 1552)   midazolam (VERSED) injection 2 mg (2 mg Intravenous Given 3/1/18 1605)   midazolam (VERSED) 2 MG/2ML injection (4 mg  Given 3/1/18 1616)   midazolam (VERSED) injection 2 mg (2 mg Intravenous Given 3/1/18 1609)   fentaNYL (SUBLIMAZE) injection 100 mcg (100 mcg Intravenous Given 3/1/18 1613)   fentaNYL (SUBLIMAZE) injection 100 mcg (100 mcg Intravenous Given 3/1/18 1648)   desmopressin (DDAVP) 25.16 mcg in sodium chloride 0.9 related to septic shock given the white count however he did come in for initial complaint of bright red blood per rectum so we ordered 2 units of trauma blood and Resident attempted right femoral line however the patient continued to decompensate while laying flat from a respiratory standpoint and we felt the need to intubate. Patient was intubated using rapid sequence intubation without complication. See resident's procedure note for intubation. We then attempted a right IJ triple-lumen catheter. Resident struck carotid artery and we immediately withdrew and applied pressure for 5 minutes. We then attempted the right IJ once again however we had to contact wires and went through 3 central line children. We could not thread wire appropriately although we were apparently cannulating the vessel. We then aborted triple-lumen and had nurses place another peripheral IV while we set up for introducer to the left femoral vein. Areas were prepped under sterile conditions. There was dark nonpulsatile blood flow over the left femoral line placed. This initially withdrew and flushed easily however on reevaluation this was positional and also became kinked internally. We are unable to use this line any further. I feel it would be in the patient's best interest to get to the ICU for further evaluation and management as soon as possible. I discussed patient care with Surgery, Intensivist, and admitting hospitalist.    Counseling:   I have spoken with the spouse and patient and discussed todays results, in addition to providing specific details for the plan of care and counseling regarding the diagnosis and prognosis and are agreeable with the plan. Assessment      1. Rectal bleeding    2. DEIDRE (acute kidney injury) ([facility] [address]    3. Lactic acidosis    4. Pneumonia due to organism    5.  Septic shock ([address]      This patient's ED course included: a personal history and physicial examination, re-evaluation prior to disposition,

## 2018-03-01 NOTE — ED NOTES
INTRODUCER NOT WORKING, DR Consuelo Roblero AT [facility] PULLED AND SANDBAG SALTY [place]brahim, [place]  03/01/18 8027

## 2018-03-01 NOTE — ED NOTES
Pt intubated with 7.5 22 at lip initial end title was [id number] Maicol Vásquez, RN  03/01/18 115 [facility] Alayna Beltran, [place]  03/01/18 7399

## 2018-03-02 PROBLEM — K92.1 MELENA: Status: ACTIVE | Noted: 2018-03-02

## 2018-03-02 LAB
% INHIBITION AA: 47.8 %
% INHIBITION ADP: 38 %
AADO2: 117 MMHG
AADO2: 148.7 MMHG
AADO2: 370.1 MMHG
AADO2: 56.4 MMHG
AADO2: 74.2 MMHG
AADO2: 96.8 MMHG
ALBUMIN SERPL-MCNC: 1.6 G/DL (ref 3.5–5.2)
ALBUMIN SERPL-MCNC: 2.1 G/DL (ref 3.5–5.2)
ALP BLD-CCNC: 23 U/L (ref 40–129)
ALP BLD-CCNC: 31 U/L (ref 40–129)
ALT SERPL-CCNC: 12 U/L (ref 0–40)
ALT SERPL-CCNC: 9 U/L (ref 0–40)
ANGLE (CLOT STRENGTH): 64.9 DEGREE (ref 59–74)
ANGLE (CLOT STRENGTH): 67.9 DEGREE (ref 59–74)
ANGLE (CLOT STRENGTH): 68 DEGREE (ref 59–74)
ANGLE (CLOT STRENGTH): 70.5 DEGREE (ref 59–74)
ANION GAP SERPL CALCULATED.3IONS-SCNC: 11 MMOL/L (ref 7–16)
ANION GAP SERPL CALCULATED.3IONS-SCNC: 8 MMOL/L (ref 7–16)
ANION GAP SERPL CALCULATED.3IONS-SCNC: 8 MMOL/L (ref 7–16)
ANISOCYTOSIS: ABNORMAL
APTT: 34.1 SEC (ref 24.5–35.1)
AST SERPL-CCNC: 12 U/L (ref 0–39)
AST SERPL-CCNC: 18 U/L (ref 0–39)
B.E.: -10.8 MMOL/L (ref -3–3)
B.E.: -12.6 MMOL/L (ref -3–3)
B.E.: -4.4 MMOL/L (ref -3–3)
B.E.: -6.2 MMOL/L (ref -3–3)
B.E.: -9.7 MMOL/L (ref -3–3)
B.E.: -9.8 MMOL/L (ref -3–3)
BASOPHILS ABSOLUTE: 0 E9/L (ref 0–0.2)
BASOPHILS ABSOLUTE: 0 E9/L (ref 0–0.2)
BASOPHILS ABSOLUTE: 0.01 E9/L (ref 0–0.2)
BASOPHILS RELATIVE PERCENT: 0 % (ref 0–2)
BASOPHILS RELATIVE PERCENT: 0.1 % (ref 0–2)
BASOPHILS RELATIVE PERCENT: 0.1 % (ref 0–2)
BILIRUB SERPL-MCNC: 0.5 MG/DL (ref 0–1.2)
BILIRUB SERPL-MCNC: 1.4 MG/DL (ref 0–1.2)
BLOOD BANK DISPENSE STATUS: NORMAL
BLOOD BANK PRODUCT CODE: NORMAL
BPU ID: NORMAL
BUN BLDV-MCNC: 53 MG/DL (ref 8–23)
BUN BLDV-MCNC: 60 MG/DL (ref 8–23)
BUN BLDV-MCNC: 72 MG/DL (ref 8–23)
BURR CELLS: ABNORMAL
CALCIUM IONIZED: 0.94 MMOL/L (ref 1.15–1.33)
CALCIUM IONIZED: 0.96 MMOL/L (ref 1.15–1.33)
CALCIUM IONIZED: 0.98 MMOL/L (ref 1.15–1.33)
CALCIUM SERPL-MCNC: 5.6 MG/DL (ref 8.6–10.2)
CALCIUM SERPL-MCNC: 6.1 MG/DL (ref 8.6–10.2)
CALCIUM SERPL-MCNC: 6.3 MG/DL (ref 8.6–10.2)
CHLORIDE BLD-SCNC: 108 MMOL/L (ref 98–107)
CHLORIDE BLD-SCNC: 108 MMOL/L (ref 98–107)
CHLORIDE BLD-SCNC: 112 MMOL/L (ref 98–107)
CK MB: 5.1 NG/ML (ref 0–7.7)
CO2: 15 MMOL/L (ref 22–29)
CO2: 19 MMOL/L (ref 22–29)
CO2: 20 MMOL/L (ref 22–29)
COHB: 0.2 % (ref 0–1.5)
COHB: 0.3 % (ref 0–1.5)
COHB: 0.5 % (ref 0–1.5)
COHB: 1.1 % (ref 0–1.5)
CREAT SERPL-MCNC: 1 MG/DL (ref 0.7–1.2)
CREAT SERPL-MCNC: 1.2 MG/DL (ref 0.7–1.2)
CREAT SERPL-MCNC: 1.3 MG/DL (ref 0.7–1.2)
CRITICAL: ABNORMAL
D DIMER: 2963 NG/ML DDU
DATE ANALYZED: ABNORMAL
DATE OF COLLECTION: ABNORMAL
DESCRIPTION BLOOD BANK: NORMAL
EOSINOPHILS ABSOLUTE: 0 E9/L (ref 0.05–0.5)
EOSINOPHILS RELATIVE PERCENT: 0 % (ref 0–6)
EPL-TEG: 0 % (ref 0–15)
EPL-TEG: 0 % (ref 0–15)
EPL-TEG: 0.2 % (ref 0–15)
EPL-TEG: 0.5 % (ref 0–15)
FIBRINOGEN: 357 MG/DL (ref 225–540)
FILM ARRAY ADENOVIRUS: ABNORMAL
FILM ARRAY BORDETELLA PERTUSSIS: ABNORMAL
FILM ARRAY CHLAMYDOPHILIA PNEUMONIAE: ABNORMAL
FILM ARRAY CORONAVIRUS 229E: ABNORMAL
FILM ARRAY CORONAVIRUS HKU1: ABNORMAL
FILM ARRAY CORONAVIRUS NL63: ABNORMAL
FILM ARRAY CORONAVIRUS OC43: ABNORMAL
FILM ARRAY INFLUENZA A VIRUS 09H1: ABNORMAL
FILM ARRAY INFLUENZA A VIRUS H1: ABNORMAL
FILM ARRAY INFLUENZA A VIRUS H3: ABNORMAL
FILM ARRAY INFLUENZA A VIRUS: ABNORMAL
FILM ARRAY METAPNEUMOVIRUS: ABNORMAL
FILM ARRAY MYCOPLASMA PNEUMONIAE: ABNORMAL
FILM ARRAY PARAINFLUENZA VIRUS 1: ABNORMAL
FILM ARRAY PARAINFLUENZA VIRUS 2: ABNORMAL
FILM ARRAY PARAINFLUENZA VIRUS 3: ABNORMAL
FILM ARRAY PARAINFLUENZA VIRUS 4: ABNORMAL
FILM ARRAY RESPIRATORY SYNCITIAL VIRUS: ABNORMAL
FILM ARRAY RHINOVIRUS/ENTEROVIRUS: ABNORMAL
FIO2: 40 %
FIO2: 60 %
FIO2: 80 %
G-TEG: 7 K D/SC (ref 4.5–11)
G-TEG: 7.3 K D/SC (ref 4.5–11)
G-TEG: 7.4 K D/SC (ref 4.5–11)
G-TEG: 8.6 K D/SC (ref 4.5–11)
GFR AFRICAN AMERICAN: >60
GFR NON-AFRICAN AMERICAN: 54 ML/MIN/1.73
GFR NON-AFRICAN AMERICAN: 59 ML/MIN/1.73
GFR NON-AFRICAN AMERICAN: >60 ML/MIN/1.73
GLUCOSE BLD-MCNC: 117 MG/DL (ref 74–109)
GLUCOSE BLD-MCNC: 319 MG/DL (ref 74–109)
GLUCOSE BLD-MCNC: 376 MG/DL (ref 74–109)
HAPTOGLOBIN: 82 MG/DL (ref 30–200)
HCO3: 13.8 MMOL/L (ref 22–26)
HCO3: 15.4 MMOL/L (ref 22–26)
HCO3: 15.6 MMOL/L (ref 22–26)
HCO3: 16.2 MMOL/L (ref 22–26)
HCO3: 19 MMOL/L (ref 22–26)
HCO3: 19.5 MMOL/L (ref 22–26)
HCT VFR BLD CALC: 16.6 % (ref 37–54)
HCT VFR BLD CALC: 17.4 % (ref 37–54)
HCT VFR BLD CALC: 23.3 % (ref 37–54)
HCT VFR BLD CALC: 24.6 % (ref 37–54)
HCT VFR BLD CALC: 25 % (ref 37–54)
HCT VFR BLD CALC: 25.2 % (ref 37–54)
HCT VFR BLD CALC: 25.5 % (ref 37–54)
HEMOGLOBIN: 6 G/DL (ref 12.5–16.5)
HEMOGLOBIN: 6 G/DL (ref 12.5–16.5)
HEMOGLOBIN: 7.6 G/DL (ref 12.5–16.5)
HEMOGLOBIN: 8.4 G/DL (ref 12.5–16.5)
HEMOGLOBIN: 8.6 G/DL (ref 12.5–16.5)
HEMOGLOBIN: 8.7 G/DL (ref 12.5–16.5)
HEMOGLOBIN: 8.9 G/DL (ref 12.5–16.5)
HHB: 0.7 % (ref 0–5)
HHB: 0.9 % (ref 0–5)
HHB: 1 % (ref 0–5)
HHB: 1.4 % (ref 0–5)
HHB: 1.5 % (ref 0–5)
HHB: 2.1 % (ref 0–5)
IMMATURE GRANULOCYTES #: 0.07 E9/L
IMMATURE GRANULOCYTES #: 0.08 E9/L
IMMATURE GRANULOCYTES %: 0.5 % (ref 0–5)
IMMATURE GRANULOCYTES %: 0.5 % (ref 0–5)
INR BLD: 1.4
INR BLD: 1.6
INR BLD: 1.6
INR BLD: 1.7
INR BLD: 1.7
K (CLOTTING TIME): 1.4 MIN (ref 1–3)
K (CLOTTING TIME): 1.6 MIN (ref 1–3)
K (CLOTTING TIME): 1.6 MIN (ref 1–3)
K (CLOTTING TIME): 1.8 MIN (ref 1–3)
LAB: 9558
LAB: ABNORMAL
LACTATE DEHYDROGENASE: 192 U/L (ref 135–225)
LACTIC ACID: 2.4 MMOL/L (ref 0.5–2.2)
LY30 (FIBRINOLYSIS): 0 % (ref 0–8)
LY30 (FIBRINOLYSIS): 0 % (ref 0–8)
LY30 (FIBRINOLYSIS): 0.2 % (ref 0–8)
LY30 (FIBRINOLYSIS): 0.5 % (ref 0–8)
LYMPHOCYTES ABSOLUTE: 0.64 E9/L (ref 1.5–4)
LYMPHOCYTES ABSOLUTE: 1.93 E9/L (ref 1.5–4)
LYMPHOCYTES ABSOLUTE: 2.7 E9/L (ref 1.5–4)
LYMPHOCYTES RELATIVE PERCENT: 14.9 % (ref 20–42)
LYMPHOCYTES RELATIVE PERCENT: 17 % (ref 20–42)
LYMPHOCYTES RELATIVE PERCENT: 8.8 % (ref 20–42)
Lab: 110
Lab: 1355
Lab: 1838
Lab: 642
Lab: 655
Lab: 845
MA (MAX AMPLITUDE): 58.4 MM (ref 50–70)
MA (MAX AMPLITUDE): 59.3 MM (ref 50–70)
MA (MAX AMPLITUDE): 59.8 MM (ref 50–70)
MA (MAX AMPLITUDE): 63.2 MM (ref 50–70)
MA-AA: 36 MM
MA-ACTIVATED: 11.5 MM
MA-ADP: 40.6 MM
MA-TEG BASELINE: 58.4 MM
MAGNESIUM: 1.6 MG/DL (ref 1.6–2.6)
MAGNESIUM: 2.1 MG/DL (ref 1.6–2.6)
MCH RBC QN AUTO: 29.7 PG (ref 26–35)
MCH RBC QN AUTO: 30.2 PG (ref 26–35)
MCH RBC QN AUTO: 30.5 PG (ref 26–35)
MCH RBC QN AUTO: 31.7 PG (ref 26–35)
MCH RBC QN AUTO: 31.9 PG (ref 26–35)
MCHC RBC AUTO-ENTMCNC: 32.6 % (ref 32–34.5)
MCHC RBC AUTO-ENTMCNC: 34.1 % (ref 32–34.5)
MCHC RBC AUTO-ENTMCNC: 34.4 % (ref 32–34.5)
MCHC RBC AUTO-ENTMCNC: 34.5 % (ref 32–34.5)
MCHC RBC AUTO-ENTMCNC: 36.1 % (ref 32–34.5)
MCV RBC AUTO: 86.9 FL (ref 80–99.9)
MCV RBC AUTO: 87.4 FL (ref 80–99.9)
MCV RBC AUTO: 88.3 FL (ref 80–99.9)
MCV RBC AUTO: 92.3 FL (ref 80–99.9)
MCV RBC AUTO: 93.6 FL (ref 80–99.9)
METER GLUCOSE: 172 MG/DL (ref 70–110)
METER GLUCOSE: 210 MG/DL (ref 70–110)
METER GLUCOSE: 252 MG/DL (ref 70–110)
METER GLUCOSE: 314 MG/DL (ref 70–110)
METER GLUCOSE: 328 MG/DL (ref 70–110)
METER GLUCOSE: 367 MG/DL (ref 70–110)
METER GLUCOSE: 91 MG/DL (ref 70–110)
METHB: 0 % (ref 0–1.5)
METHB: 0.3 % (ref 0–1.5)
METHB: 0.4 % (ref 0–1.5)
METHB: 0.7 % (ref 0–1.5)
MODE: AC
MONOCYTES ABSOLUTE: 0.36 E9/L (ref 0.1–0.95)
MONOCYTES ABSOLUTE: 1.12 E9/L (ref 0.1–0.95)
MONOCYTES ABSOLUTE: 1.49 E9/L (ref 0.1–0.95)
MONOCYTES RELATIVE PERCENT: 5.3 % (ref 2–12)
MONOCYTES RELATIVE PERCENT: 8.6 % (ref 2–12)
MONOCYTES RELATIVE PERCENT: 9.4 % (ref 2–12)
NEUTROPHILS ABSOLUTE: 11.64 E9/L (ref 1.8–7.3)
NEUTROPHILS ABSOLUTE: 6.11 E9/L (ref 1.8–7.3)
NEUTROPHILS ABSOLUTE: 9.84 E9/L (ref 1.8–7.3)
NEUTROPHILS RELATIVE PERCENT: 73 % (ref 43–80)
NEUTROPHILS RELATIVE PERCENT: 76 % (ref 43–80)
NEUTROPHILS RELATIVE PERCENT: 86 % (ref 43–80)
O2 CONTENT: 12.1 ML/DL
O2 CONTENT: 12.5 ML/DL
O2 CONTENT: 12.8 ML/DL
O2 CONTENT: 12.8 ML/DL
O2 CONTENT: 14.6 ML/DL
O2 CONTENT: 8.9 ML/DL
O2 SATURATION: 97.9 % (ref 92–98.5)
O2 SATURATION: 98.5 % (ref 92–98.5)
O2 SATURATION: 98.6 % (ref 92–98.5)
O2 SATURATION: 99 % (ref 92–98.5)
O2 SATURATION: 99.1 % (ref 92–98.5)
O2 SATURATION: 99.3 % (ref 92–98.5)
O2HB: 97.3 % (ref 94–97)
O2HB: 97.8 % (ref 94–97)
O2HB: 97.9 % (ref 94–97)
O2HB: 98.7 % (ref 94–97)
OPERATOR ID: 2067
OPERATOR ID: 264
OPERATOR ID: 264
OPERATOR ID: 7490
OPERATOR ID: ABNORMAL
OPERATOR ID: ABNORMAL
ORGANISM: ABNORMAL
PATHOLOGIST REVIEW: NORMAL
PATIENT TEMP: 37 C
PCO2: 31.3 MMHG (ref 35–45)
PCO2: 31.5 MMHG (ref 35–45)
PCO2: 33.5 MMHG (ref 35–45)
PCO2: 35.2 MMHG (ref 35–45)
PCO2: 35.9 MMHG (ref 35–45)
PCO2: 36.9 MMHG (ref 35–45)
PDW BLD-RTO: 13.9 FL (ref 11.5–15)
PDW BLD-RTO: 14.1 FL (ref 11.5–15)
PDW BLD-RTO: 14.3 FL (ref 11.5–15)
PDW BLD-RTO: 14.6 FL (ref 11.5–15)
PDW BLD-RTO: 14.6 FL (ref 11.5–15)
PEEP/CPAP: 5 CMH?O
PFO2: 1.79 MMHG/%
PFO2: 2.89 MMHG/%
PFO2: 3.5 MMHG/%
PFO2: 3.83 MMHG/%
PFO2: 4.2 MMHG/%
PFO2: 4.44 MMHG/%
PH BLOOD GAS: 7.23 (ref 7.35–7.45)
PH BLOOD GAS: 7.25 (ref 7.35–7.45)
PH BLOOD GAS: 7.28 (ref 7.35–7.45)
PH BLOOD GAS: 7.31 (ref 7.35–7.45)
PH BLOOD GAS: 7.34 (ref 7.35–7.45)
PH BLOOD GAS: 7.41 (ref 7.35–7.45)
PHOSPHORUS: 3.1 MG/DL (ref 2.5–4.5)
PHOSPHORUS: 3.4 MG/DL (ref 2.5–4.5)
PLATELET # BLD: 113 E9/L (ref 130–450)
PLATELET # BLD: 57 E9/L (ref 130–450)
PLATELET # BLD: 65 E9/L (ref 130–450)
PLATELET # BLD: 93 E9/L (ref 130–450)
PLATELET # BLD: 97 E9/L (ref 130–450)
PLATELET CONFIRMATION: NORMAL
PMV BLD AUTO: 10.6 FL (ref 7–12)
PMV BLD AUTO: 11.3 FL (ref 7–12)
PMV BLD AUTO: 11.5 FL (ref 7–12)
PMV BLD AUTO: 11.7 FL (ref 7–12)
PMV BLD AUTO: 11.8 FL (ref 7–12)
PO2: 115.8 MMHG (ref 60–100)
PO2: 139.9 MMHG (ref 60–100)
PO2: 143.3 MMHG (ref 60–100)
PO2: 167.8 MMHG (ref 60–100)
PO2: 177.5 MMHG (ref 60–100)
PO2: 229.5 MMHG (ref 60–100)
POIKILOCYTES: ABNORMAL
POIKILOCYTES: ABNORMAL
POTASSIUM REFLEX MAGNESIUM: 4.7 MMOL/L (ref 3.5–5)
POTASSIUM REFLEX MAGNESIUM: 6 MMOL/L (ref 3.5–5)
POTASSIUM SERPL-SCNC: 3.93 MMOL/L (ref 3.3–5.1)
POTASSIUM SERPL-SCNC: 4.1 MMOL/L (ref 3.5–5)
PROTHROMBIN TIME: 15.8 SEC (ref 9.3–12.4)
PROTHROMBIN TIME: 17.2 SEC (ref 9.3–12.4)
PROTHROMBIN TIME: 17.7 SEC (ref 9.3–12.4)
PROTHROMBIN TIME: 18.7 SEC (ref 9.3–12.4)
PROTHROMBIN TIME: 19.4 SEC (ref 9.3–12.4)
R (REACTION TIME): 4 MIN (ref 5–10)
R (REACTION TIME): 4.3 MIN (ref 5–10)
R (REACTION TIME): 5.5 MIN (ref 5–10)
R (REACTION TIME): 6.6 MIN (ref 5–10)
RBC # BLD: 1.88 E12/L (ref 3.8–5.8)
RBC # BLD: 1.99 E12/L (ref 3.8–5.8)
RBC # BLD: 2.49 E12/L (ref 3.8–5.8)
RBC # BLD: 2.71 E12/L (ref 3.8–5.8)
RBC # BLD: 2.83 E12/L (ref 3.8–5.8)
RI(T): 0.32
RI(T): 0.65
RI(T): 0.69
RI(T): 1.01
RI(T): 2.58
RI(T): 44 %
RR MECHANICAL: 25 B/MIN
SODIUM BLD-SCNC: 135 MMOL/L (ref 132–146)
SODIUM BLD-SCNC: 136 MMOL/L (ref 132–146)
SODIUM BLD-SCNC: 138 MMOL/L (ref 132–146)
SOURCE, BLOOD GAS: ABNORMAL
THB: 10.1 G/DL (ref 11.5–16.5)
THB: 6.1 G/DL (ref 11.5–16.5)
THB: 8.7 G/DL (ref 11.5–16.5)
THB: 8.8 G/DL (ref 11.5–16.5)
THB: 9.1 G/DL (ref 11.5–16.5)
THB: 9.1 G/DL (ref 11.5–16.5)
TIME ANALYZED: 113
TIME ANALYZED: 1357
TIME ANALYZED: 1844
TIME ANALYZED: 645
TIME ANALYZED: 658
TIME ANALYZED: 859
TOTAL CK: 259 U/L (ref 20–200)
TOTAL PROTEIN: 2.8 G/DL (ref 6.4–8.3)
TOTAL PROTEIN: 3.7 G/DL (ref 6.4–8.3)
TROPONIN: <0.01 NG/ML (ref 0–0.03)
URINE CULTURE, ROUTINE: NORMAL
VT MECHANICAL: 550 ML
WBC # BLD: 11.2 E9/L (ref 4.5–11.5)
WBC # BLD: 13 E9/L (ref 4.5–11.5)
WBC # BLD: 15.9 E9/L (ref 4.5–11.5)
WBC # BLD: 7.1 E9/L (ref 4.5–11.5)
WBC # BLD: 7.3 E9/L (ref 4.5–11.5)

## 2018-03-02 PROCEDURE — 85576 BLOOD PLATELET AGGREGATION: CPT

## 2018-03-02 PROCEDURE — 83735 ASSAY OF MAGNESIUM: CPT

## 2018-03-02 PROCEDURE — 85018 HEMOGLOBIN: CPT

## 2018-03-02 PROCEDURE — C1760 CLOSURE DEV, VASC: HCPCS

## 2018-03-02 PROCEDURE — 85610 PROTHROMBIN TIME: CPT

## 2018-03-02 PROCEDURE — 36430 TRANSFUSION BLD/BLD COMPNT: CPT

## 2018-03-02 PROCEDURE — 83605 ASSAY OF LACTIC ACID: CPT

## 2018-03-02 PROCEDURE — 03HC33Z INSERTION OF INFUSION DEVICE INTO LEFT RADIAL ARTERY, PERCUTANEOUS APPROACH: ICD-10-PCS | Performed by: INTERNAL MEDICINE

## 2018-03-02 PROCEDURE — 83615 LACTATE (LD) (LDH) ENZYME: CPT

## 2018-03-02 PROCEDURE — P9035 PLATELET PHERES LEUKOREDUCED: HCPCS

## 2018-03-02 PROCEDURE — 0DC98ZZ EXTIRPATION OF MATTER FROM DUODENUM, VIA NATURAL OR ARTIFICIAL OPENING ENDOSCOPIC: ICD-10-PCS | Performed by: SURGERY

## 2018-03-02 PROCEDURE — P9059 PLASMA, FRZ BETWEEN 8-24HOUR: HCPCS

## 2018-03-02 PROCEDURE — 85025 COMPLETE CBC W/AUTO DIFF WBC: CPT

## 2018-03-02 PROCEDURE — 9990 CHARGE CONVERSION

## 2018-03-02 PROCEDURE — 82550 ASSAY OF CK (CPK): CPT

## 2018-03-02 PROCEDURE — 85384 FIBRINOGEN ACTIVITY: CPT

## 2018-03-02 PROCEDURE — 36620 INSERTION CATHETER ARTERY: CPT

## 2018-03-02 PROCEDURE — 84100 ASSAY OF PHOSPHORUS: CPT

## 2018-03-02 PROCEDURE — 84132 ASSAY OF SERUM POTASSIUM: CPT

## 2018-03-02 PROCEDURE — 84484 ASSAY OF TROPONIN QUANT: CPT

## 2018-03-02 PROCEDURE — C1758 CATHETER, URETERAL: HCPCS

## 2018-03-02 PROCEDURE — C9113 INJ PANTOPRAZOLE SODIUM, VIA: HCPCS

## 2018-03-02 PROCEDURE — 83010 ASSAY OF HAPTOGLOBIN QUANT: CPT

## 2018-03-02 PROCEDURE — 36245 INS CATH ABD/L-EXT ART 1ST: CPT

## 2018-03-02 PROCEDURE — 43235 EGD DIAGNOSTIC BRUSH WASH: CPT | Performed by: SURGERY

## 2018-03-02 PROCEDURE — 75774 ARTERY X-RAY EACH VESSEL: CPT

## 2018-03-02 PROCEDURE — 74176 CT ABD & PELVIS W/O CONTRAST: CPT

## 2018-03-02 PROCEDURE — 37244 VASC EMBOLIZE/OCCLUDE BLEED: CPT

## 2018-03-02 PROCEDURE — 85014 HEMATOCRIT: CPT

## 2018-03-02 PROCEDURE — 80053 COMPREHEN METABOLIC PANEL: CPT

## 2018-03-02 PROCEDURE — 82962 GLUCOSE BLOOD TEST: CPT

## 2018-03-02 PROCEDURE — 80048 BASIC METABOLIC PNL TOTAL CA: CPT

## 2018-03-02 PROCEDURE — 85347 COAGULATION TIME ACTIVATED: CPT

## 2018-03-02 PROCEDURE — 36415 COLL VENOUS BLD VENIPUNCTURE: CPT

## 2018-03-02 PROCEDURE — C1769 GUIDE WIRE: HCPCS

## 2018-03-02 PROCEDURE — 36248 INS CATH ABD/L-EXT ART ADDL: CPT

## 2018-03-02 PROCEDURE — 82330 ASSAY OF CALCIUM: CPT

## 2018-03-02 PROCEDURE — P9016 RBC LEUKOCYTES REDUCED: HCPCS

## 2018-03-02 PROCEDURE — 94003 VENT MGMT INPAT SUBQ DAY: CPT

## 2018-03-02 PROCEDURE — 75726 ARTERY X-RAYS ABDOMEN: CPT

## 2018-03-02 PROCEDURE — 82553 CREATINE MB FRACTION: CPT

## 2018-03-02 PROCEDURE — 82805 BLOOD GASES W/O2 SATURATION: CPT

## 2018-03-02 PROCEDURE — 85379 FIBRIN DEGRADATION QUANT: CPT

## 2018-03-02 PROCEDURE — 06HN33Z INSERTION OF INFUSION DEVICE INTO LEFT FEMORAL VEIN, PERCUTANEOUS APPROACH: ICD-10-PCS | Performed by: SURGERY

## 2018-03-02 PROCEDURE — 04L23DZ OCCLUSION OF GASTRIC ARTERY WITH INTRALUMINAL DEVICE, PERCUTANEOUS APPROACH: ICD-10-PCS | Performed by: RADIOLOGY

## 2018-03-02 PROCEDURE — 36251 INS CATH REN ART 1ST UNILAT: CPT

## 2018-03-02 PROCEDURE — 85730 THROMBOPLASTIN TIME PARTIAL: CPT

## 2018-03-02 PROCEDURE — 85027 COMPLETE CBC AUTOMATED: CPT

## 2018-03-02 PROCEDURE — 36247 INS CATH ABD/L-EXT ART 3RD: CPT

## 2018-03-02 RX ORDER — MINERAL OIL AND WHITE PETROLATUM 150; 830 MG/G; MG/G
OINTMENT OPHTHALMIC EVERY 4 HOURS
Status: DISCONTINUED | OUTPATIENT
Start: 2018-03-02 | End: 2018-03-07

## 2018-03-02 RX ORDER — OSELTAMIVIR PHOSPHATE 30 MG/1
30 CAPSULE ORAL 2 TIMES DAILY
Status: DISCONTINUED | OUTPATIENT
Start: 2018-03-02 | End: 2018-03-02

## 2018-03-02 RX ORDER — 0.9 % SODIUM CHLORIDE 0.9 %
250 INTRAVENOUS SOLUTION INTRAVENOUS ONCE
Status: DISCONTINUED | OUTPATIENT
Start: 2018-03-02 | End: 2018-03-02 | Stop reason: ALTCHOICE

## 2018-03-02 RX ORDER — PANTOPRAZOLE SODIUM 40 MG/10ML
40 INJECTION, POWDER, LYOPHILIZED, FOR SOLUTION INTRAVENOUS DAILY
Status: DISCONTINUED | OUTPATIENT
Start: 2018-03-02 | End: 2018-03-02

## 2018-03-02 RX ORDER — VECURONIUM BROMIDE 1 MG/ML
10 INJECTION, POWDER, LYOPHILIZED, FOR SOLUTION INTRAVENOUS ONCE
Status: COMPLETED | OUTPATIENT
Start: 2018-03-02 | End: 2018-03-02

## 2018-03-02 RX ORDER — NICOTINE POLACRILEX 4 MG
15 LOZENGE BUCCAL PRN
Status: DISCONTINUED | OUTPATIENT
Start: 2018-03-02 | End: 2018-03-07

## 2018-03-02 RX ORDER — 0.9 % SODIUM CHLORIDE 0.9 %
250 INTRAVENOUS SOLUTION INTRAVENOUS ONCE
Status: DISCONTINUED | OUTPATIENT
Start: 2018-03-02 | End: 2018-03-04 | Stop reason: SDUPTHER

## 2018-03-02 RX ORDER — 0.9 % SODIUM CHLORIDE 0.9 %
10 VIAL (ML) INJECTION DAILY
Status: DISCONTINUED | OUTPATIENT
Start: 2018-03-02 | End: 2018-03-02

## 2018-03-02 RX ORDER — VECURONIUM BROMIDE 1 MG/ML
INJECTION, POWDER, LYOPHILIZED, FOR SOLUTION INTRAVENOUS
Status: COMPLETED
Start: 2018-03-02 | End: 2018-03-02

## 2018-03-02 RX ORDER — 0.9 % SODIUM CHLORIDE 0.9 %
250 INTRAVENOUS SOLUTION INTRAVENOUS ONCE
Status: COMPLETED | OUTPATIENT
Start: 2018-03-02 | End: 2018-03-03

## 2018-03-02 RX ORDER — DEXTROSE MONOHYDRATE 50 MG/ML
INJECTION, SOLUTION INTRAVENOUS
Status: DISPENSED
Start: 2018-03-02 | End: 2018-03-03

## 2018-03-02 RX ORDER — DEXTROSE MONOHYDRATE 50 MG/ML
100 INJECTION, SOLUTION INTRAVENOUS PRN
Status: DISCONTINUED | OUTPATIENT
Start: 2018-03-02 | End: 2018-03-07

## 2018-03-02 RX ORDER — DEXTROSE MONOHYDRATE 25 G/50ML
12.5 INJECTION, SOLUTION INTRAVENOUS PRN
Status: DISCONTINUED | OUTPATIENT
Start: 2018-03-02 | End: 2018-03-07

## 2018-03-02 RX ORDER — MAGNESIUM SULFATE IN WATER 40 MG/ML
2 INJECTION, SOLUTION INTRAVENOUS ONCE
Status: COMPLETED | OUTPATIENT
Start: 2018-03-02 | End: 2018-03-02

## 2018-03-02 RX ORDER — CHLORHEXIDINE GLUCONATE 0.12 MG/ML
15 RINSE ORAL 2 TIMES DAILY
Status: DISCONTINUED | OUTPATIENT
Start: 2018-03-02 | End: 2018-03-07

## 2018-03-02 RX ORDER — DEXTROSE MONOHYDRATE 50 MG/ML
INJECTION, SOLUTION INTRAVENOUS
Status: COMPLETED
Start: 2018-03-02 | End: 2018-03-02

## 2018-03-02 RX ORDER — FENTANYL CITRATE 50 UG/ML
75 INJECTION, SOLUTION INTRAMUSCULAR; INTRAVENOUS ONCE
Status: COMPLETED | OUTPATIENT
Start: 2018-03-02 | End: 2018-03-02

## 2018-03-02 RX ADMIN — Medication 10 ML: at 09:31

## 2018-03-02 RX ADMIN — Medication 250 ML: at 03:55

## 2018-03-02 RX ADMIN — Medication 250 ML: at 08:13

## 2018-03-02 RX ADMIN — VECURONIUM BROMIDE 10 MG: 1 INJECTION, POWDER, LYOPHILIZED, FOR SOLUTION INTRAVENOUS at 08:15

## 2018-03-02 RX ADMIN — PANTOPRAZOLE SODIUM 40 MG: 40 INJECTION, POWDER, LYOPHILIZED, FOR SOLUTION INTRAVENOUS at 14:46

## 2018-03-02 RX ADMIN — MAGNESIUM SULFATE IN WATER 2 G: 40 INJECTION, SOLUTION INTRAVENOUS at 20:10

## 2018-03-02 RX ADMIN — Medication 250 ML: at 08:14

## 2018-03-02 RX ADMIN — PANTOPRAZOLE SODIUM 40 MG: 40 INJECTION, POWDER, LYOPHILIZED, FOR SOLUTION INTRAVENOUS at 09:31

## 2018-03-02 RX ADMIN — Medication 100 ML: at 15:14

## 2018-03-02 RX ADMIN — Medication 10 ML: at 09:33

## 2018-03-02 RX ADMIN — LIDOCAINE HYDROCHLORIDE: 10 INJECTION, SOLUTION INFILTRATION; PERINEURAL at 01:15

## 2018-03-02 RX ADMIN — FENTANYL CITRATE 75 MCG: 50 INJECTION, SOLUTION INTRAMUSCULAR; INTRAVENOUS at 07:18

## 2018-03-02 RX ADMIN — SODIUM CHLORIDE: 9 INJECTION, SOLUTION INTRAVENOUS at 07:06

## 2018-03-02 RX ADMIN — PIPERACILLIN SODIUM, TAZOBACTAM SODIUM 3.38 G: 3; .375 INJECTION, POWDER, LYOPHILIZED, FOR SOLUTION INTRAVENOUS at 02:38

## 2018-03-02 RX ADMIN — PIPERACILLIN SODIUM, TAZOBACTAM SODIUM 3.38 G: 3; .375 INJECTION, POWDER, LYOPHILIZED, FOR SOLUTION INTRAVENOUS at 15:12

## 2018-03-02 RX ADMIN — MINERAL OIL AND WHITE PETROLATUM: 150; 830 OINTMENT OPHTHALMIC at 21:40

## 2018-03-02 RX ADMIN — SODIUM CHLORIDE: 9 INJECTION, SOLUTION INTRAVENOUS at 21:34

## 2018-03-02 RX ADMIN — DEXTROSE MONOHYDRATE 100 ML: 50 INJECTION, SOLUTION INTRAVENOUS at 02:38

## 2018-03-02 RX ADMIN — MINERAL OIL AND WHITE PETROLATUM: 150; 830 OINTMENT OPHTHALMIC at 16:44

## 2018-03-02 RX ADMIN — Medication 10 ML: at 08:46

## 2018-03-02 RX ADMIN — CHLORHEXIDINE GLUCONATE 15 ML: 0.12 RINSE ORAL at 21:42

## 2018-03-02 ASSESSMENT — PAIN SCALES - GENERAL
PAINLEVEL_OUTOF10: 0

## 2018-03-02 ASSESSMENT — PULMONARY FUNCTION TESTS
PIF_VALUE: 36
PIF_VALUE: 46
PIF_VALUE: 52
PIF_VALUE: 30
PIF_VALUE: 19
PIF_VALUE: 44
PIF_VALUE: 30
PIF_VALUE: 37
PIF_VALUE: 35
PIF_VALUE: 30
PIF_VALUE: 28
PIF_VALUE: 41
PIF_VALUE: 30
PIF_VALUE: 36
PIF_VALUE: 37
PIF_VALUE: 33
PIF_VALUE: 42
PIF_VALUE: 31
PIF_VALUE: 39
PIF_VALUE: 37
PIF_VALUE: 55
PIF_VALUE: 38
PIF_VALUE: 32
PIF_VALUE: 31
PIF_VALUE: 33
PIF_VALUE: 39
PIF_VALUE: 41
PIF_VALUE: 37
PIF_VALUE: 37
PIF_VALUE: 36
PIF_VALUE: 55
PIF_VALUE: 36
PIF_VALUE: 37
PIF_VALUE: 33
PIF_VALUE: 40
PIF_VALUE: 44
PIF_VALUE: 33
PIF_VALUE: 41
PIF_VALUE: 42
PIF_VALUE: 39
PIF_VALUE: 36
PIF_VALUE: 44
PIF_VALUE: 0

## 2018-03-02 NOTE — CONSULTS
GENERAL SURGERY  CONSULT NOTE  3/1/2018    Physician Consulted: Dr. Scotty Lora  Reason for Consult: GI bleed  Referring Physician: Dr. Claudette Pew    HPI  Tracy Vasquez is a 69 y.o. male who presents for evaluation of bright red blood and clots per rectum since early morning. Apparently took his AM dose of metoprolol and felt a little lightheaded. While in the ED he became hypotensive and lethargic, and was eventually intubated around 4pm. On admission had lactic acidosis of 5.5, WBC 27.3, Hb 14, and 6pm labs showed LA 2.2, WBC 15.5, and Hb 11.3. No blood thinners on home med list, and INR 1.7. General Surgery consulted. History limited as patient is intubated and no family available at bedside. No past medical history on file. No past surgical history on file. Medications Prior to Admission:    Prior to Admission medications    Medication Sig Start Date End Date Taking? Authorizing Provider   isosorbide mononitrate (IMDUR) 60 MG extended release tablet Take 1 tablet by mouth daily 2/8/18  Yes Historical Provider, MD   lisinopril (PRINIVIL;ZESTRIL) 5 MG tablet Take 1 tablet by mouth daily 2/8/18  Yes Historical Provider, MD   metoprolol tartrate (LOPRESSOR) 50 MG tablet Take 1 tablet by mouth 2 times daily 2/15/18  Yes Historical Provider, MD   simvastatin (ZOCOR) 80 MG tablet Take 1 tablet by mouth daily 2/13/18  Yes Historical Provider, MD   albuterol (PROVENTIL) (2.5 MG/3ML) 0.083% nebulizer solution Take 3 mLs by nebulization 4 times daily as needed 2/17/18  Yes Historical Provider, MD   Cholecalciferol (VITAMIN D3) 2000 units CAPS Take 1 capsule by mouth daily   Yes Historical Provider, MD       No Known Allergies    No family history on file.     Social History   Substance Use Topics    Smoking status: Not on file    Smokeless tobacco: Not on file    Alcohol use Not on file         Review of Systems: unable to obtain    PHYSICAL EXAM:    Vitals:    03/01/18 1827   BP: (!) 76/45   Pulse: 94   Resp: 12   Temp: lungs. There are patchy airspace opacities of the visualized right lower lung possibly representing infiltrates. Small hiatal hernia. Liver, spleen, adrenal glands, pancreas and gallbladder are unremarkable. Kidneys are seen in normal size, shape and location. No hydronephrosis or hydroureter. No identifiable obstructive uropathy. Urinary bladder is unremarkable. Prostate gland measures 3.5 x 4.5 cm. Visualized bowel loops are unremarkable. No bowel obstruction. Normal appendix. No free air or fluid in the abdomen or pelvis. No identifiable lymphadenopathy. No inflammatory changes in the abdomen or pelvis. Moderate to severe atherosclerotic calcifications of the abdominal aorta. Patent lumen of the distal aorta measures 7 x 7 mm. IVC is unremarkable. 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 Multilevel moderate to severe degenerative disc disease and facet joint arthropathy of the lower lumbar spine. Chronic mild compression deformity of superior endplate of L1 with 5% anterior height loss. 1. Motion artifacts. Suboptimally study. 2. No inflammatory changes in the abdomen or pelvis. 3. No bowel obstruction or ileus. 4. No obstructive uropathy or urinary stone. 5. Small hiatal hernia. 6. Normal appendix. 7. 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 8. Patchy airspace opacities of the right lung base may represent infiltrates. Xr Chest Portable    Result Date: 3/1/2018  Patient MRN:  [id number] : 1940 Age: 69 years Gender: Male Order Date:  3/1/2018 EXAM: XR CHEST PORTABLE view one INDICATION:  syncope  COMPARISON: None FINDINGS: Heart and the mediastinal structures are normal. There is COPD with hyperinflation of lungs and flattening of the diaphragm. There is strandy linear perihilar densities concerning for bronchitis with patchy infiltrates in the right lung base concerning for developing pneumonia.  The

## 2018-03-02 NOTE — PROGRESS NOTES
Patient received from [facility], report given by Misael Nelson. Patient received on bear hugger, nimbex, levo, bicarb, protonix, sandostatin, versed, and fent .

## 2018-03-02 NOTE — PROGRESS NOTES
Pharmacy Consultation Note  (Antibiotic Dosing and Monitoring)    Initial consult date:  3/1  Consulting physician:  Drug(s): vancomycin      Miguel Wray is a 77/M who presented to [facility] with weakness and bright red blood clos per rectum. Became more lethargic in ED and intubated for airway protection, received blood transfusion, fluids and required vasopressors. Film array positive for influenza B, Per progress notes CXR showed possible right middle lobe infiltrate; tamifllu ordered; empiric vancomycin and zosyn also started. Age/  Gender Height Weight IBW Dosing weight  Allergy Information   77/M  67\"  89.2 kg  66 kg  75 kg  Patient has no known allergies.           Other anti-infectives Start date Stop date   Zosyn 3.375 g IV q12 hr  3/2    oseltamivir 30 mg PO BID (not receiving doses due to upper GIB)  3/2                Date  Tmax WBC BUN/CR CrCL Drug/Dose Time   Given Level(s)   (Time) Comments   3/1 99.1 27.3 94/2.3  Vancomycin 1250 mg IV x 1    Vancomycin 750 mg IV x 1 14:38    22:58     3/2 98.2 13 72/1.3 50 ml/min Vancomycin 1500 mg IV q24 hr  (2200)                                   Intake/Output Summary (Last 24 hours) at 03/02/18 2967  Last data filed at 03/02/18 9313   Gross per 24 hour   Intake             4824 ml   Output             1125 ml   Net             3699 ml       Average urine output:0.7 ml/kg/hr    Cultures:    3/1 film array positive for influenza B  3/1 respiratory culture ordered but not yet collected  3/1 blood cultures sent  3/1 urine culture sent    IV lines:  3/2 arterial line left radial  3/1 peripheral right antecubital  3/1 peripheral right forearm    Radiology:  3/1 CT abdomen read as Patchy airspace opacities of the right lung base may represent  infiltrates    Assessment:  · 77/M admitted with GIB, intubated for airway protection, found to have influenza B on tamiflu, initiated on empiric vancomycin and zosyn  · Clinical pharmacist consulted to dose vancomycin; goal

## 2018-03-02 NOTE — CONSULTS
mL Intravenous 2 times per day    piperacillin-tazobactam  3.375 g Intravenous Q12H     Continuous Infusions:   octreotide (SANDOSTATIN) infusion 25 mcg/hr (03/02/18 0656)    fentaNYL 5 mcg/ml in D5W 250 ml infusion 100 mcg/hr (03/02/18 0718)    cisatracurium (NIMBEX) infusion 2 mcg/kg/min (03/02/18 0827)    pantoprozole (PROTONIX) infusion 8 mg/hr (03/02/18 1448)    dextrose      dextrose      insulin (HUMAN R) non-weight based infusion      midazolam 4 mg/hr (03/02/18 1445)    norepinephrine 15 mcg/min (03/02/18 1628)    sodium bicarbonate infusion 150 mL/hr at 03/02/18 1404    sodium chloride 20 mL/hr at 03/02/18 0800     PRN Meds:glucose, dextrose, glucagon (rDNA), dextrose, acetaminophen, ondansetron    Allergies:  Patient has no known allergies.     Social History:   Social History     Social History    Marital status:      Spouse name: N/A    Number of children: N/A    Years of education: N/A     Social History Main Topics    Smoking status: Current Every Day Smoker     Packs/day: 1.00     Years: 50.00     Types: Cigarettes    Smokeless tobacco: None    Alcohol use No    Drug use: No    Sexual activity: No     Other Topics Concern    None     Social History Narrative    None       Family History:   No CAD O DM    REVIEW OF SYSTEMS:    14 ROS negative unless otherwise specified in the HPI    PHYSICAL EXAM:    Vitals:    BP (!) 207/85   Pulse 83   Temp 94.5 °F (34.7 °C) (Core)   Resp (!) 43   Ht 5' 7\" (1.702 m)   Wt 196 lb 11.2 oz (89.2 kg)   SpO2 100%   BMI 30.81 kg/m²     General Appearance:  intubated and sedated appears stated age   Head:    Normocephalic, without obvious abnormality, atraumatic   Eyes:    PERRL, conjunctiva/corneas clear      Ears:    Normal and external ear canals, both ears   Nose:   Nares normal, septum midline, mucosa normal, no drainage    or sinus tenderness   Throat:   Lips, mucosa, and tongue normal; teeth and gums normal   Neck:   Supple, trachea

## 2018-03-02 NOTE — PROGRESS NOTES
1140-Patient placed on angio table with monitoring devices, right femoral 2+ and right  pedal pulses per doppler. Right pedal pulse obtained with doppler and marked. 1148 Right groin prepped and draped. 1201-Right femoral artery accessed and angiogram initiated by Dr Laina Vidal. 1241- Interlock coil 4 mm x 15 cm deployed X 3 in GDA  1247- Interlock coil 4 mm x 8 cm deployed. in [address]- Completed. Sheath pulled and Mynx closure device used to close arterial puncture but failed. Manual pressure applied for 20 min. 1320- Puncture site cleansed and dry dressing applied. No bleeding, swelling or complications noted, no change in pulses. Resp therapy and Lauren RN here and report given. Transported back to unit.

## 2018-03-02 NOTE — PROGRESS NOTES
Dr. Krupa Bryant called to bedside as patient is having copious amounts of bloody stools with large clots. Surgery notified. 2nd of 4 units of RBCs infusing.

## 2018-03-02 NOTE — H&P
Interventional Radiology Preprocedure Note    I reviewed patient's most recent history and physical exam (in EPIC dated 3/2/18) and agree that there are no significant interval changes. Labs reviewed. Allergies review.     Patient consented for image guided mesenteric angiogram with coil embolization      Electronically signed by Chuck Paul MD on 3/2/2018 at 2:40 PM

## 2018-03-02 NOTE — H&P
Sputum 3/1     Urine Strep pneumonia Ag        Urine Legionella Ag        Blood  3/1      Urine  3/1        Antibiotic  Days  Day started     vancomycin     3/1     Zosyn     3/1                   Oxygen: intubated 3/1      Lines:  Site  Day  Date inserted     TLC   Left IJ      3/1     PICC              Arterial line              Peripheral line                           DVT Prophylaxis Active GI bleeding with INR 1.7     Heparin         Enoxaparin        PCDs        Imaging studies:  CXR  3/1/2018  COPD with bronchitis and developing infiltrates in the right lung base concerning for developing pneumonia. CT abdomen WO contrast 3/1/2018       1. Motion artifacts. Suboptimally study. 2. No inflammatory changes in the abdomen or pelvis. 3. No bowel obstruction or ileus. 4. No obstructive uropathy or urinary stone. 5. Small hiatal hernia. 6. Normal appendix. 7. 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 8. Patchy airspace opacities of the right lung base may represent   infiltrates.          Resident's Assessment & Plan     Neurology  Acute encephalopathy   · Likely due to shock (septic vs hemorrhagic), metabolic (uremia and HAGMA)  · On sedation now  · Reevaluate after more stable    Cardiovascular  Shock hemorrhagic and septic  · On presentation: SBP 70s, WBC 27.3, with BRBPR  · Received 2.5 liters of saline, and started on Levophed  · 3 units PRBC received in ER, ordered 4 units FFP and  Platelet transfusion   · Levophed started in ED, weaning down  · Insert central line and arterial line  · Obtain NICOM, mixed venous  · CXR showed possible right middle lobe infiltrate  · Follow up panculture, but procalcitonin unremarkable 0.14  · Received vancomycin and zosyn in ED, continue    Hx of CAD   - s/p stent (unknown age)   - hold aspirin and GDMT as hypotensive with DEIDRE    Pulmonary   Acute hypercapnic respiratory failure  · Community acquired pneumonia from influenza  · Intubated in ED  · Leukocytosis, no fever, procalcitonin 0.14  · Received vancomycin and zosyn in ED, continue   · Monitor daily ABG and CXR  · Start weaning parameters once stable from shock    Influenza B  · Tamiflu ordered --- although NPO with blood from OG tube    GI  GI bleeding, upper and lower probable  · Hgb dropped from 14.3 to 11.3 after 3 units of trauma blood transfusion  · GS evaluated, no upper GI bleeding, suspect source diverticulosis  · Received Protonix bolus in ED -- continue protonix infusion  · INR 1.7 on presentation, no blood thinner used, home med aspirin 81 mg daily  · 3 units PRBC received in ER, ordered 4 units FFP and  Platelet transfusion   · Given uremia (BUN 94) and DEIDRE, give DDAVP  · Transfuse 1 plt (on aspirin at home)  · H/h q 6 hrs     Renal   DEIDRE  · No known history of kidney disease  · Likely due to shock and probable ATN  · Follow renal function and urine output  · Check urine lytes  · Continue IVF and aggressive transfusions    HAGMA  · CO2 14 on presentation, likely due to uremia  · ABG 7.014/57.8/371.8/14.4  · No lactic acidosis  · Repeat ABG  · On HCO3 gtt    GI & DVT prophylaxis: protonix/ pcds    Case discussed with ICU attending Dr. Rhiannon Cabello M.D., PGY-1  Internal Medicine resident    Senior Resident Statement  I have seen and examined the patient with the intern. I have discussed the case, including pertinent history and exam findings with the intern. I agree with the assessment, plan and orders as documented by the intern. I have also discussed the plan with the attending on call, Dr. Carrington Foster. Cara Taylor DO, PGY 3  Attending Physician: Dr. Verner Guise      Addendum:  Patient was scoped early this morning by trauma team. Per Op report there was blood present in stomach and duodenum. We were unable to visualize the ulcer or source of bleeding, but it is presumed a duodenal ulcer.   Patient has been aggressively resuscitated , please see

## 2018-03-02 NOTE — PROGRESS NOTES
Dr. Torito Salas called to bedside as patient had 150cc of bright red blood out of OG when hooked to LIS. See new orders.

## 2018-03-02 NOTE — PROGRESS NOTES
GENERAL SURGERY  DAILY PROGRESS NOTE  3/2/2018    Subjective:  Patient seen and examined earlier this morning. Ongoing large amount of BRBPR. OG output now frankly bloody. Persistent hypotension on norepinephrine. Receiving multiple blood products.     Objective:  BP (!) 135/48   Pulse 74   Temp 98.4 °F (36.9 °C)   Resp 25   Ht 5' 7\" (1.702 m)   Wt 196 lb 11.2 oz (89.2 kg)   SpO2 93%   BMI 30.81 kg/m²     GENERAL:  Intubated and sedated  LUNGS:  Ventilated  CARDIOVASCULAR:  Tachycardic  ABDOMEN:  Softly distended, not apparently tender  SKIN: Warm and dry    Assessment/Plan:  69 y.o. male acute gastrointestinal hemorrhage with hemorrhagic shock    Will proceed with emergent EGD and likely IR angiography  Ongoing resuscitation and correction of coagulopathy in coordination with MICU  Continue Protonix infusion, agree with addition of octreotide    Electronically signed by Karolyn Echols MD on 3/2/2018 at 7:46 AM

## 2018-03-03 PROBLEM — D62 ACUTE BLOOD LOSS ANEMIA: Status: ACTIVE | Noted: 2018-03-03

## 2018-03-03 LAB
AADO2: 57.8 MMHG
ALBUMIN SERPL-MCNC: 1.7 G/DL (ref 3.5–5.2)
ALP BLD-CCNC: 24 U/L (ref 40–129)
ALT SERPL-CCNC: 9 U/L (ref 0–40)
ANGLE (CLOT STRENGTH): 66.1 DEGREE (ref 59–74)
ANGLE (CLOT STRENGTH): 68 DEGREE (ref 59–74)
ANGLE (CLOT STRENGTH): 71.3 DEGREE (ref 59–74)
ANION GAP SERPL CALCULATED.3IONS-SCNC: 8 MMOL/L (ref 7–16)
ANISOCYTOSIS: ABNORMAL
APTT: 22.8 SEC (ref 24.5–35.1)
APTT: 26.3 SEC (ref 24.5–35.1)
AST SERPL-CCNC: 12 U/L (ref 0–39)
B.E.: 0.4 MMOL/L (ref -3–3)
BASOPHILS ABSOLUTE: 0 E9/L (ref 0–0.2)
BASOPHILS RELATIVE PERCENT: 0.1 % (ref 0–2)
BILIRUB SERPL-MCNC: 0.3 MG/DL (ref 0–1.2)
BUN BLDV-MCNC: 48 MG/DL (ref 8–23)
CALCIUM IONIZED: 1.03 MMOL/L (ref 1.15–1.33)
CALCIUM IONIZED: 1.12 MMOL/L (ref 1.15–1.33)
CALCIUM IONIZED: 1.13 MMOL/L (ref 1.15–1.33)
CALCIUM IONIZED: 1.2 MMOL/L (ref 1.15–1.33)
CALCIUM SERPL-MCNC: 6.7 MG/DL (ref 8.6–10.2)
CHLORIDE BLD-SCNC: 109 MMOL/L (ref 98–107)
CO2: 24 MMOL/L (ref 22–29)
COHB: 1.3 % (ref 0–1.5)
CREAT SERPL-MCNC: 1.2 MG/DL (ref 0.7–1.2)
CRITICAL: ABNORMAL
DATE ANALYZED: ABNORMAL
DATE OF COLLECTION: ABNORMAL
EOSINOPHILS ABSOLUTE: 0 E9/L (ref 0.05–0.5)
EOSINOPHILS RELATIVE PERCENT: 0.2 % (ref 0–6)
EPL-TEG: 0.4 % (ref 0–15)
FIO2: 40 %
G-TEG: 7 K D/SC (ref 4.5–11)
G-TEG: 7.3 K D/SC (ref 4.5–11)
G-TEG: 8.7 K D/SC (ref 4.5–11)
GFR AFRICAN AMERICAN: >60
GFR NON-AFRICAN AMERICAN: 59 ML/MIN/1.73
GLUCOSE BLD-MCNC: 99 MG/DL (ref 74–109)
HCO3: 24 MMOL/L (ref 22–26)
HCT VFR BLD CALC: 23.4 % (ref 37–54)
HCT VFR BLD CALC: 25.5 % (ref 37–54)
HCT VFR BLD CALC: 26.6 % (ref 37–54)
HCT VFR BLD CALC: 29.4 % (ref 37–54)
HEMOGLOBIN: 10.4 G/DL (ref 12.5–16.5)
HEMOGLOBIN: 8 G/DL (ref 12.5–16.5)
HEMOGLOBIN: 8.9 G/DL (ref 12.5–16.5)
HEMOGLOBIN: 9.1 G/DL (ref 12.5–16.5)
HHB: 0.7 % (ref 0–5)
INR BLD: 1.1
INR BLD: 1.2
K (CLOTTING TIME): 1.2 MIN (ref 1–3)
K (CLOTTING TIME): 1.6 MIN (ref 1–3)
K (CLOTTING TIME): 1.7 MIN (ref 1–3)
LAB: ABNORMAL
LY30 (FIBRINOLYSIS): 0.4 % (ref 0–8)
LYMPHOCYTES ABSOLUTE: 2.21 E9/L (ref 1.5–4)
LYMPHOCYTES RELATIVE PERCENT: 20.6 % (ref 20–42)
Lab: 514
MA (MAX AMPLITUDE): 58.4 MM (ref 50–70)
MA (MAX AMPLITUDE): 59.2 MM (ref 50–70)
MA (MAX AMPLITUDE): 63.4 MM (ref 50–70)
MAGNESIUM: 2 MG/DL (ref 1.6–2.6)
MCH RBC QN AUTO: 29 PG (ref 26–35)
MCH RBC QN AUTO: 29 PG (ref 26–35)
MCH RBC QN AUTO: 29.2 PG (ref 26–35)
MCH RBC QN AUTO: 29.4 PG (ref 26–35)
MCHC RBC AUTO-ENTMCNC: 34.2 % (ref 32–34.5)
MCHC RBC AUTO-ENTMCNC: 34.2 % (ref 32–34.5)
MCHC RBC AUTO-ENTMCNC: 34.9 % (ref 32–34.5)
MCHC RBC AUTO-ENTMCNC: 35.4 % (ref 32–34.5)
MCV RBC AUTO: 83.1 FL (ref 80–99.9)
MCV RBC AUTO: 83.1 FL (ref 80–99.9)
MCV RBC AUTO: 84.8 FL (ref 80–99.9)
MCV RBC AUTO: 85.3 FL (ref 80–99.9)
METER GLUCOSE: 100 MG/DL (ref 70–110)
METER GLUCOSE: 102 MG/DL (ref 70–110)
METER GLUCOSE: 103 MG/DL (ref 70–110)
METER GLUCOSE: 108 MG/DL (ref 70–110)
METER GLUCOSE: 109 MG/DL (ref 70–110)
METER GLUCOSE: 112 MG/DL (ref 70–110)
METER GLUCOSE: 117 MG/DL (ref 70–110)
METER GLUCOSE: 130 MG/DL (ref 70–110)
METER GLUCOSE: 72 MG/DL (ref 70–110)
METER GLUCOSE: 85 MG/DL (ref 70–110)
METER GLUCOSE: 92 MG/DL (ref 70–110)
METER GLUCOSE: 93 MG/DL (ref 70–110)
METHB: 0.2 % (ref 0–1.5)
MODE: AC
MONOCYTES ABSOLUTE: 0.21 E9/L (ref 0.1–0.95)
MONOCYTES RELATIVE PERCENT: 1.9 % (ref 2–12)
NEUTROPHILS ABSOLUTE: 8.19 E9/L (ref 1.8–7.3)
NEUTROPHILS RELATIVE PERCENT: 77.6 % (ref 43–80)
O2 CONTENT: 13.3 ML/DL
O2 SATURATION: 99.3 % (ref 92–98.5)
O2HB: 97.8 % (ref 94–97)
OPERATOR ID: ABNORMAL
PATIENT TEMP: 37 C
PCO2: 34.9 MMHG (ref 35–45)
PDW BLD-RTO: 14.2 FL (ref 11.5–15)
PDW BLD-RTO: 14.6 FL (ref 11.5–15)
PDW BLD-RTO: 15 FL (ref 11.5–15)
PDW BLD-RTO: 15.6 FL (ref 11.5–15)
PEEP/CPAP: 5 CMH?O
PFO2: 4.52 MMHG/%
PH BLOOD GAS: 7.46 (ref 7.35–7.45)
PHOSPHORUS: 3.1 MG/DL (ref 2.5–4.5)
PLATELET # BLD: 100 E9/L (ref 130–450)
PLATELET # BLD: 71 E9/L (ref 130–450)
PLATELET # BLD: 78 E9/L (ref 130–450)
PLATELET # BLD: 95 E9/L (ref 130–450)
PLATELET CONFIRMATION: NORMAL
PMV BLD AUTO: 11.2 FL (ref 7–12)
PMV BLD AUTO: 11.5 FL (ref 7–12)
PMV BLD AUTO: 11.9 FL (ref 7–12)
PMV BLD AUTO: 12.3 FL (ref 7–12)
PO2: 180.9 MMHG (ref 60–100)
POTASSIUM REFLEX MAGNESIUM: 3.8 MMOL/L (ref 3.5–5)
PROCALCITONIN: 6.76 NG/ML (ref 0–0.08)
PROTHROMBIN TIME: 12.5 SEC (ref 9.3–12.4)
PROTHROMBIN TIME: 13 SEC (ref 9.3–12.4)
R (REACTION TIME): 3.7 MIN (ref 5–10)
R (REACTION TIME): 3.9 MIN (ref 5–10)
R (REACTION TIME): 4 MIN (ref 5–10)
RBC # BLD: 2.76 E12/L (ref 3.8–5.8)
RBC # BLD: 3.07 E12/L (ref 3.8–5.8)
RBC # BLD: 3.12 E12/L (ref 3.8–5.8)
RBC # BLD: 3.54 E12/L (ref 3.8–5.8)
RI(T): 32 %
RR MECHANICAL: 25 B/MIN
SODIUM BLD-SCNC: 141 MMOL/L (ref 132–146)
SOURCE, BLOOD GAS: ABNORMAL
THB: 9.4 G/DL (ref 11.5–16.5)
TIME ANALYZED: 514
TOTAL PROTEIN: 3.1 G/DL (ref 6.4–8.3)
VT MECHANICAL: 550 ML
WBC # BLD: 10.5 E9/L (ref 4.5–11.5)
WBC # BLD: 11.3 E9/L (ref 4.5–11.5)
WBC # BLD: 12.5 E9/L (ref 4.5–11.5)
WBC # BLD: 9 E9/L (ref 4.5–11.5)

## 2018-03-03 PROCEDURE — 36415 COLL VENOUS BLD VENIPUNCTURE: CPT

## 2018-03-03 PROCEDURE — 84145 PROCALCITONIN (PCT): CPT

## 2018-03-03 PROCEDURE — 80053 COMPREHEN METABOLIC PANEL: CPT

## 2018-03-03 PROCEDURE — 85347 COAGULATION TIME ACTIVATED: CPT

## 2018-03-03 PROCEDURE — 85730 THROMBOPLASTIN TIME PARTIAL: CPT

## 2018-03-03 PROCEDURE — 9990 CHARGE CONVERSION

## 2018-03-03 PROCEDURE — 99291 CRITICAL CARE FIRST HOUR: CPT | Performed by: SURGERY

## 2018-03-03 PROCEDURE — 82330 ASSAY OF CALCIUM: CPT

## 2018-03-03 PROCEDURE — 85610 PROTHROMBIN TIME: CPT

## 2018-03-03 PROCEDURE — 85027 COMPLETE CBC AUTOMATED: CPT

## 2018-03-03 PROCEDURE — C9113 INJ PANTOPRAZOLE SODIUM, VIA: HCPCS

## 2018-03-03 PROCEDURE — 85384 FIBRINOGEN ACTIVITY: CPT

## 2018-03-03 PROCEDURE — 82962 GLUCOSE BLOOD TEST: CPT

## 2018-03-03 PROCEDURE — 83735 ASSAY OF MAGNESIUM: CPT

## 2018-03-03 PROCEDURE — 85025 COMPLETE CBC W/AUTO DIFF WBC: CPT

## 2018-03-03 PROCEDURE — 82805 BLOOD GASES W/O2 SATURATION: CPT

## 2018-03-03 PROCEDURE — 85576 BLOOD PLATELET AGGREGATION: CPT

## 2018-03-03 PROCEDURE — P9016 RBC LEUKOCYTES REDUCED: HCPCS

## 2018-03-03 PROCEDURE — 84100 ASSAY OF PHOSPHORUS: CPT

## 2018-03-03 PROCEDURE — 94003 VENT MGMT INPAT SUBQ DAY: CPT

## 2018-03-03 RX ORDER — POTASSIUM CHLORIDE 29.8 MG/ML
20 INJECTION INTRAVENOUS ONCE
Status: COMPLETED | OUTPATIENT
Start: 2018-03-03 | End: 2018-03-03

## 2018-03-03 RX ORDER — 0.9 % SODIUM CHLORIDE 0.9 %
250 INTRAVENOUS SOLUTION INTRAVENOUS ONCE
Status: COMPLETED | OUTPATIENT
Start: 2018-03-03 | End: 2018-03-03

## 2018-03-03 RX ADMIN — SODIUM CHLORIDE: 9 INJECTION, SOLUTION INTRAVENOUS at 16:26

## 2018-03-03 RX ADMIN — Medication 10 ML: at 20:55

## 2018-03-03 RX ADMIN — Medication 10 ML: at 09:21

## 2018-03-03 RX ADMIN — Medication 250 ML: at 01:57

## 2018-03-03 RX ADMIN — MINERAL OIL AND WHITE PETROLATUM: 150; 830 OINTMENT OPHTHALMIC at 09:22

## 2018-03-03 RX ADMIN — MINERAL OIL AND WHITE PETROLATUM: 150; 830 OINTMENT OPHTHALMIC at 14:30

## 2018-03-03 RX ADMIN — MINERAL OIL AND WHITE PETROLATUM: 150; 830 OINTMENT OPHTHALMIC at 06:12

## 2018-03-03 RX ADMIN — MINERAL OIL AND WHITE PETROLATUM: 150; 830 OINTMENT OPHTHALMIC at 18:00

## 2018-03-03 RX ADMIN — MINERAL OIL AND WHITE PETROLATUM: 150; 830 OINTMENT OPHTHALMIC at 01:47

## 2018-03-03 RX ADMIN — DEXTROSE MONOHYDRATE 12.5 G: 25 INJECTION, SOLUTION INTRAVENOUS at 21:34

## 2018-03-03 RX ADMIN — MINERAL OIL AND WHITE PETROLATUM: 150; 830 OINTMENT OPHTHALMIC at 21:05

## 2018-03-03 RX ADMIN — CHLORHEXIDINE GLUCONATE 15 ML: 0.12 RINSE ORAL at 21:04

## 2018-03-03 RX ADMIN — POTASSIUM CHLORIDE 20 MEQ: 29.8 INJECTION INTRAVENOUS at 09:13

## 2018-03-03 RX ADMIN — DEXTROSE MONOHYDRATE 12.5 G: 25 INJECTION, SOLUTION INTRAVENOUS at 21:05

## 2018-03-03 RX ADMIN — PIPERACILLIN SODIUM, TAZOBACTAM SODIUM 3.38 G: 3; .375 INJECTION, POWDER, LYOPHILIZED, FOR SOLUTION INTRAVENOUS at 01:47

## 2018-03-03 RX ADMIN — CHLORHEXIDINE GLUCONATE 15 ML: 0.12 RINSE ORAL at 09:28

## 2018-03-03 RX ADMIN — SODIUM CHLORIDE: 9 INJECTION, SOLUTION INTRAVENOUS at 04:09

## 2018-03-03 ASSESSMENT — PULMONARY FUNCTION TESTS
PIF_VALUE: 39
PIF_VALUE: 36
PIF_VALUE: 35
PIF_VALUE: 42
PIF_VALUE: 31
PIF_VALUE: 29
PIF_VALUE: 34
PIF_VALUE: 44
PIF_VALUE: 39
PIF_VALUE: 28
PIF_VALUE: 37
PIF_VALUE: 37
PIF_VALUE: 38
PIF_VALUE: 46
PIF_VALUE: 26
PIF_VALUE: 37
PIF_VALUE: 34
PIF_VALUE: 31
PIF_VALUE: 39
PIF_VALUE: 37
PIF_VALUE: 32
PIF_VALUE: 31
PIF_VALUE: 42
PIF_VALUE: 31
PIF_VALUE: 33
PIF_VALUE: 39
PIF_VALUE: 43
PIF_VALUE: 41
PIF_VALUE: 31

## 2018-03-03 ASSESSMENT — PAIN SCALES - GENERAL
PAINLEVEL_OUTOF10: 0
PAINLEVEL_OUTOF10: 0

## 2018-03-03 NOTE — PROGRESS NOTES
Surgical Intensive Care Unit   Daily Progress Note     Date of admission: 3/1/2018    Reason for ICU: hemorrhagic shock from upper GI bleed    Pertinent Hospital Course Events:   3/1 Presentation to ED for acute upper GI bleed, admission to MICU  3/2 EGD with small amount of blood ins stomach, duodenum with large amount of blood and large clot, IR GDA embolization, transfer to SICU  3/3 DC nimbex, versed, sandy drips    Overnight Events: as above    Subjective: intubated, sedated. Physical Exam:   BP (!) 102/53   Pulse 87   Temp 97.7 °F (36.5 °C) (Core)   Resp 25   Ht 5' 7\" (1.702 m)   Wt 225 lb 9.6 oz (102.3 kg)   SpO2 100%   BMI 35.33 kg/m²      CONSTITUTIONAL: NAD  NEURO: Sedated, intubated  LUNGS: intubated on vent, CTABL  CARDIOVASCULAR: RR  ABDOMEN: soft, NT, ND, NG clear, flexi-seal with dark red blood  MUSCULOSKELETAL: 1+ edema peripherally     ASSESSMENT / PLAN:   Neuro: Intubated, sedated, narcotized. DC nimbex gtt, DC versed gtt. Monitor neuro status  CV: Hemorrhagic shock resolving sp 14U PRBC/8u FFP/4U platelets transfused. On levophed, wean as able. Pulm: Intubated on vent. On AC. Wean sedation, hopefully start PS trials when more awake. GI: NPO, NGT, flexiseal in place. Hemorrhagic shock due to acute upper GI bleed from duodenal ulcer sp 3/2 EGD with duodenal ulcer and clot, 3/2 IR GDA embolization. Stop sandostatin gtt, continue PPI gtt. NPO for now. Renal: No acute issues. UOP adequate. Monitor  ID: Started on rocephin for infiltrate on CXR, procalcitonin is 6. ?Aspiration related to UGIB. Endocrine: No acute issues. Monitor BG   MSK: No acute issues. PTOT when able   Heme: Hemorrhagic shock related to acute upper GI bleed sp transfusion 14u PRBC, 8u FFP, 5u platelets. Continue to monitor.      Bowel regimen: none at present  Pain control/Sedation: fentanyl   DVT prophylaxis: SCDs  GI: Protonix drip  Glucose protocol: SSI as needed  Mouth/Eye care: Artificial tears/peridex

## 2018-03-03 NOTE — PROGRESS NOTES
Clinical Pharmacy Note    ID stopped vancomycin on 3/2. Clinical Pharmacy sign off.     Stephanie Sarah, PharmMISHA  3/3/2018  9:22 AM  Pager: [phone]

## 2018-03-03 NOTE — PROGRESS NOTES
ID Progress Note      Brief Interval History    Subjective:  3/3- s/p coil emobolisation of gastroduodenal art, NPO no meds  The patient is awake and alert. Tolerating medications. Reports no side effects. Afebrile. 10 ROS otherwise negative unless otherwise specified above. Objective:    Vitals:    03/03/18 0700   BP:    Pulse: 87   Resp: 25   Temp: 98.2 °F (36.8 °C)   SpO2: 100%     VENT SETTINGS:   Vent Information  Vent Type: 980  Vent Mode: A/C  Vt Ordered: 550 mL  Vt Exhaled: 526 mL  Rate Set: 25 bmp  Rate Measured: 25 br/min  Minute Volume: 13.1 Liters  Peak Flow: 60 L/min  Pressure Support: 0 cmH20  FiO2 : 40 %  Peak Inspiratory Pressure: 42 cmH2O  I:E Ratio: 1:1.40  Sensitivity: 3  High Peep/I Pressure: 0  PEEP/CPAP: 5  I Time/ I Time %: 0 s  Mean Airway Pressure: 19 cmH20  Plateau Pressure: 29 cmH20  Static Compliance: 21 mL/cmH2O  General Appearance:    Awake, alert , no acute distress.    HEENT:    Normocephalic,PERRL,neck supple, no JVD, mucosa moist, no thrush   Lungs:     Clear to auscultation bilaterally, no wheeze , crackles   Heart:    Regular rate and rhythm, no murmur   Abdomen:     Soft, non-tender, not distended  bowel sounds present,   Extremities:   No edema,no open wound,no erythema, non  tender   Skin:   no rashes or lesions     Labs:  Recent Labs      03/02/18   1837  03/03/18   0014  03/03/18   0440   WBC  7.1  9.0  10.5   RBC  2.83*  2.76*  3.07*   HGB  8.4*  8.0*  8.9*   HCT  24.6*  23.4*  25.5*   MCV  86.9  84.8  83.1   MCH  29.7  29.0  29.0   MCHC  34.1  34.2  34.9*   RDW  13.9  14.2  14.6   PLT  65*  71*  78*   MPV  10.6  11.2  11.5     CMP:    Lab Results   Component Value Date     03/03/2018    K 3.8 03/03/2018     03/03/2018    CO2 24 03/03/2018    BUN 48 03/03/2018    CREATININE 1.2 03/03/2018    GFRAA >60 03/03/2018    LABGLOM 59 03/03/2018    GLUCOSE 99 03/03/2018    PROT 3.1 03/03/2018    LABALBU 1.7 03/03/2018    CALCIUM 6.7 03/03/2018    BILITOT 0.3 position. 5. Discrete subpleural areas of atelectasis or increased density in   the posterior costophrenic sulcus of both lower lobes. 6. No perihilar vascular congestion. XR Chest Abdomen Ng Placement   Final Result   NG tube in good position in the fundal region of the stomach, although   partially coiled. XR CHEST PORTABLE   Final Result   1. Vague, ill-defined groundglass opacity towards the right parahilar   region peripherally located. Can represent a pulmonary nodule. Further   evaluation with a CT scan of the chest, which can be done initially   without IV contrast, is recommended. 2. No superimposed acute cardiopulmonary process. 3. Good position for the endotracheal tube. 4. There is no pneumothorax on the right or on the left. ALERT:  THIS IS AN ABNORMAL REPORT. XR CHEST PORTABLE   Final Result   1. Endotracheal tube in good position just proximal to the upper   contour of the arch of the aorta. 2. NG tube in good position. 3. No acute cardiopulmonary process. XR Chest Abdomen Ng Placement   Final Result   Nonspecific bowel gas pattern with a OG tube in the stomach. CT ABDOMEN PELVIS WO CONTRAST Additional Contrast? None   Final Result      1. Motion artifacts. Suboptimally study. 2. No inflammatory changes in the abdomen or pelvis. 3. No bowel obstruction or ileus. 4. No obstructive uropathy or urinary stone. 5. Small hiatal hernia. 6. Normal appendix. 7. 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 8. Patchy airspace opacities of the right lung base may represent   infiltrates. 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                   URINARY CATHETER OUTPUT (Galvan):  Urethral Catheter Latex;Straight-tip-Output (mL): 15 mL    DRAIN/TUBE OUTPUT:

## 2018-03-03 NOTE — PROGRESS NOTES
3/2  CODE: FULL     DISPOSITION-Continue ICU Care    Critical care time exclusive of teaching and procedures = 35 min     Pt needs continuous ICU monitoring because the patient is at risk for deterioration from a hemorrhagic shock standpoint.     Mayra Mukherjee MD, FACS  3/3/2018  8:59 AM

## 2018-03-04 LAB
AADO2: 160.4 MMHG
ALBUMIN SERPL-MCNC: 1.4 G/DL (ref 3.5–5.2)
ALP BLD-CCNC: 25 U/L (ref 40–129)
ALT SERPL-CCNC: 8 U/L (ref 0–40)
ANION GAP SERPL CALCULATED.3IONS-SCNC: 6 MMOL/L (ref 7–16)
AST SERPL-CCNC: 10 U/L (ref 0–39)
B.E.: 0.8 MMOL/L (ref -3–3)
BASOPHILS ABSOLUTE: 0.01 E9/L (ref 0–0.2)
BASOPHILS RELATIVE PERCENT: 0.1 % (ref 0–2)
BILIRUB SERPL-MCNC: <0.2 MG/DL (ref 0–1.2)
BUN BLDV-MCNC: 39 MG/DL (ref 8–23)
CALCIUM IONIZED: 1.13 MMOL/L (ref 1.15–1.33)
CALCIUM IONIZED: 1.18 MMOL/L (ref 1.15–1.33)
CALCIUM SERPL-MCNC: 6.6 MG/DL (ref 8.6–10.2)
CHLORIDE BLD-SCNC: 111 MMOL/L (ref 98–107)
CO2: 24 MMOL/L (ref 22–29)
COHB: 0.3 % (ref 0–1.5)
CREAT SERPL-MCNC: 1.2 MG/DL (ref 0.7–1.2)
CRITICAL: ABNORMAL
DATE ANALYZED: ABNORMAL
DATE OF COLLECTION: ABNORMAL
EOSINOPHILS ABSOLUTE: 0.14 E9/L (ref 0.05–0.5)
EOSINOPHILS RELATIVE PERCENT: 1.4 % (ref 0–6)
FIO2: 40 %
GFR AFRICAN AMERICAN: >60
GFR NON-AFRICAN AMERICAN: 59 ML/MIN/1.73
GLUCOSE BLD-MCNC: 154 MG/DL (ref 74–109)
HCO3: 25.1 MMOL/L (ref 22–26)
HCT VFR BLD CALC: 22.7 % (ref 37–54)
HCT VFR BLD CALC: 23 % (ref 37–54)
HCT VFR BLD CALC: 23.6 % (ref 37–54)
HCT VFR BLD CALC: 25.3 % (ref 37–54)
HEMOGLOBIN: 7.5 G/DL (ref 12.5–16.5)
HEMOGLOBIN: 7.8 G/DL (ref 12.5–16.5)
HEMOGLOBIN: 7.9 G/DL (ref 12.5–16.5)
HEMOGLOBIN: 8.6 G/DL (ref 12.5–16.5)
HHB: 6.6 % (ref 0–5)
IMMATURE GRANULOCYTES #: 0.13 E9/L
IMMATURE GRANULOCYTES %: 1.3 % (ref 0–5)
INR BLD: 1.2
LAB: ABNORMAL
LYMPHOCYTES ABSOLUTE: 1.81 E9/L (ref 1.5–4)
LYMPHOCYTES RELATIVE PERCENT: 18.7 % (ref 20–42)
Lab: 836
MAGNESIUM: 2.1 MG/DL (ref 1.6–2.6)
MCH RBC QN AUTO: 29.1 PG (ref 26–35)
MCH RBC QN AUTO: 29.5 PG (ref 26–35)
MCHC RBC AUTO-ENTMCNC: 33.1 % (ref 32–34.5)
MCHC RBC AUTO-ENTMCNC: 34.3 % (ref 32–34.5)
MCV RBC AUTO: 85.8 FL (ref 80–99.9)
MCV RBC AUTO: 88.1 FL (ref 80–99.9)
METER GLUCOSE: 100 MG/DL (ref 70–110)
METER GLUCOSE: 108 MG/DL (ref 70–110)
METER GLUCOSE: 108 MG/DL (ref 70–110)
METER GLUCOSE: 153 MG/DL (ref 70–110)
METER GLUCOSE: 156 MG/DL (ref 70–110)
METER GLUCOSE: 60 MG/DL (ref 70–110)
METER GLUCOSE: 61 MG/DL (ref 70–110)
METER GLUCOSE: 62 MG/DL (ref 70–110)
METER GLUCOSE: 80 MG/DL (ref 70–110)
METHB: 0.2 % (ref 0–1.5)
MODE: AC
MONOCYTES ABSOLUTE: 0.85 E9/L (ref 0.1–0.95)
MONOCYTES RELATIVE PERCENT: 8.8 % (ref 2–12)
NEUTROPHILS ABSOLUTE: 6.72 E9/L (ref 1.8–7.3)
NEUTROPHILS RELATIVE PERCENT: 69.7 % (ref 43–80)
O2 CONTENT: 12.7 ML/DL
O2 SATURATION: 93.4 % (ref 92–98.5)
O2HB: 92.9 % (ref 94–97)
OPERATOR ID: ABNORMAL
PATIENT TEMP: 37 C
PCO2: 39 MMHG (ref 35–45)
PDW BLD-RTO: 15.6 FL (ref 11.5–15)
PDW BLD-RTO: 15.6 FL (ref 11.5–15)
PEEP/CPAP: 5 CMH?O
PFO2: 1.75 MMHG/%
PH BLOOD GAS: 7.43 (ref 7.35–7.45)
PHOSPHORUS: 2.3 MG/DL (ref 2.5–4.5)
PLATELET # BLD: 87 E9/L (ref 130–450)
PLATELET # BLD: 95 E9/L (ref 130–450)
PLATELET CONFIRMATION: NORMAL
PLATELET CONFIRMATION: NORMAL
PMV BLD AUTO: 12.1 FL (ref 7–12)
PMV BLD AUTO: 12.2 FL (ref 7–12)
PO2: 70 MMHG (ref 60–100)
POTASSIUM REFLEX MAGNESIUM: 4.1 MMOL/L (ref 3.5–5)
PROTHROMBIN TIME: 13.2 SEC (ref 9.3–12.4)
RBC # BLD: 2.68 E12/L (ref 3.8–5.8)
RBC # BLD: 2.68 E12/L (ref 3.8–5.8)
RI(T): 229 %
RR MECHANICAL: 25 B/MIN
SODIUM BLD-SCNC: 141 MMOL/L (ref 132–146)
SOURCE, BLOOD GAS: ABNORMAL
THB: 9.7 G/DL (ref 11.5–16.5)
TIME ANALYZED: 838
TOTAL PROTEIN: 3.1 G/DL (ref 6.4–8.3)
VT MECHANICAL: 550 ML
WBC # BLD: 10.1 E9/L (ref 4.5–11.5)
WBC # BLD: 9.7 E9/L (ref 4.5–11.5)

## 2018-03-04 PROCEDURE — P9016 RBC LEUKOCYTES REDUCED: HCPCS

## 2018-03-04 PROCEDURE — 71045 X-RAY EXAM CHEST 1 VIEW: CPT

## 2018-03-04 PROCEDURE — 94003 VENT MGMT INPAT SUBQ DAY: CPT

## 2018-03-04 PROCEDURE — 84100 ASSAY OF PHOSPHORUS: CPT

## 2018-03-04 PROCEDURE — C9113 INJ PANTOPRAZOLE SODIUM, VIA: HCPCS

## 2018-03-04 PROCEDURE — 80053 COMPREHEN METABOLIC PANEL: CPT

## 2018-03-04 PROCEDURE — 83735 ASSAY OF MAGNESIUM: CPT

## 2018-03-04 PROCEDURE — 82330 ASSAY OF CALCIUM: CPT

## 2018-03-04 PROCEDURE — 85014 HEMATOCRIT: CPT

## 2018-03-04 PROCEDURE — 36415 COLL VENOUS BLD VENIPUNCTURE: CPT

## 2018-03-04 PROCEDURE — 85027 COMPLETE CBC AUTOMATED: CPT

## 2018-03-04 PROCEDURE — 99291 CRITICAL CARE FIRST HOUR: CPT | Performed by: SURGERY

## 2018-03-04 PROCEDURE — 82805 BLOOD GASES W/O2 SATURATION: CPT

## 2018-03-04 PROCEDURE — 82962 GLUCOSE BLOOD TEST: CPT

## 2018-03-04 PROCEDURE — 9990 CHARGE CONVERSION

## 2018-03-04 PROCEDURE — 85610 PROTHROMBIN TIME: CPT

## 2018-03-04 PROCEDURE — 85018 HEMOGLOBIN: CPT

## 2018-03-04 PROCEDURE — 85025 COMPLETE CBC W/AUTO DIFF WBC: CPT

## 2018-03-04 RX ORDER — 0.9 % SODIUM CHLORIDE 0.9 %
500 INTRAVENOUS SOLUTION INTRAVENOUS ONCE
Status: COMPLETED | OUTPATIENT
Start: 2018-03-04 | End: 2018-03-04

## 2018-03-04 RX ORDER — FUROSEMIDE 10 MG/ML
20 INJECTION INTRAMUSCULAR; INTRAVENOUS ONCE
Status: COMPLETED | OUTPATIENT
Start: 2018-03-04 | End: 2018-03-04

## 2018-03-04 RX ORDER — LORAZEPAM 2 MG/ML
1 INJECTION INTRAMUSCULAR EVERY 6 HOURS PRN
Status: DISCONTINUED | OUTPATIENT
Start: 2018-03-04 | End: 2018-03-05

## 2018-03-04 RX ORDER — 0.9 % SODIUM CHLORIDE 0.9 %
250 INTRAVENOUS SOLUTION INTRAVENOUS ONCE
Status: COMPLETED | OUTPATIENT
Start: 2018-03-04 | End: 2018-03-04

## 2018-03-04 RX ORDER — DEXTROSE AND SODIUM CHLORIDE 5; .9 G/100ML; G/100ML
INJECTION, SOLUTION INTRAVENOUS CONTINUOUS
Status: DISCONTINUED | OUTPATIENT
Start: 2018-03-04 | End: 2018-03-05

## 2018-03-04 RX ADMIN — Medication 10 ML: at 21:06

## 2018-03-04 RX ADMIN — FUROSEMIDE 20 MG: 10 INJECTION INTRAMUSCULAR; INTRAVENOUS at 12:20

## 2018-03-04 RX ADMIN — LORAZEPAM 1 MG: 2 INJECTION INTRAMUSCULAR at 14:31

## 2018-03-04 RX ADMIN — DEXTROSE AND SODIUM CHLORIDE: 5; .9 INJECTION, SOLUTION INTRAVENOUS at 03:11

## 2018-03-04 RX ADMIN — MINERAL OIL AND WHITE PETROLATUM: 150; 830 OINTMENT OPHTHALMIC at 21:28

## 2018-03-04 RX ADMIN — DEXTROSE MONOHYDRATE 12.5 G: 25 INJECTION, SOLUTION INTRAVENOUS at 12:45

## 2018-03-04 RX ADMIN — Medication 10 ML: at 21:07

## 2018-03-04 RX ADMIN — CHLORHEXIDINE GLUCONATE 15 ML: 0.12 RINSE ORAL at 08:19

## 2018-03-04 RX ADMIN — Medication 10 ML: at 08:18

## 2018-03-04 RX ADMIN — MINERAL OIL AND WHITE PETROLATUM: 150; 830 OINTMENT OPHTHALMIC at 14:40

## 2018-03-04 RX ADMIN — DEXTROSE MONOHYDRATE 25 G: 25 INJECTION, SOLUTION INTRAVENOUS at 03:09

## 2018-03-04 RX ADMIN — Medication 100 ML: at 05:05

## 2018-03-04 RX ADMIN — MINERAL OIL AND WHITE PETROLATUM: 150; 830 OINTMENT OPHTHALMIC at 10:29

## 2018-03-04 RX ADMIN — DEXTROSE MONOHYDRATE 25 G: 25 INJECTION, SOLUTION INTRAVENOUS at 02:15

## 2018-03-04 RX ADMIN — CHLORHEXIDINE GLUCONATE 15 ML: 0.12 RINSE ORAL at 21:06

## 2018-03-04 RX ADMIN — MINERAL OIL AND WHITE PETROLATUM: 150; 830 OINTMENT OPHTHALMIC at 16:55

## 2018-03-04 RX ADMIN — MINERAL OIL AND WHITE PETROLATUM: 150; 830 OINTMENT OPHTHALMIC at 02:16

## 2018-03-04 RX ADMIN — Medication 500 ML: at 02:20

## 2018-03-04 ASSESSMENT — PULMONARY FUNCTION TESTS
PIF_VALUE: 42
PIF_VALUE: 36
PIF_VALUE: 37
PIF_VALUE: 32
PIF_VALUE: 31
PIF_VALUE: 30
PIF_VALUE: 32
PIF_VALUE: 38
PIF_VALUE: 33
PIF_VALUE: 30
PIF_VALUE: 38
PIF_VALUE: 32
PIF_VALUE: 36
PIF_VALUE: 39
PIF_VALUE: 30
PIF_VALUE: 33
PIF_VALUE: 39
PIF_VALUE: 35
PIF_VALUE: 32
PIF_VALUE: 33
PIF_VALUE: 30
PIF_VALUE: 32
PIF_VALUE: 34
PIF_VALUE: 33
PIF_VALUE: 38
PIF_VALUE: 35
PIF_VALUE: 30
PIF_VALUE: 32

## 2018-03-04 ASSESSMENT — PAIN SCALES - GENERAL
PAINLEVEL_OUTOF10: 0
PAINLEVEL_OUTOF10: 0

## 2018-03-04 NOTE — FLOWSHEET NOTE
Despite verbal redirection patient continues to pull at ETT and Iv lines. Restraints continued for patients safety.

## 2018-03-04 NOTE — FLOWSHEET NOTE
Pt placed in restraints at this time d/t inability to follow verbal commands and be redirected. Pt immediately grabs at caregivers, lines, and ETT tube once restraints are discontinued. Also becomes highly agitated with any form of patient care. Care Plan added as well as education. Initiation form also started. Charge RN informed of need for restraints.

## 2018-03-04 NOTE — PROGRESS NOTES
03/04/2018    CALCIUM 6.6 03/04/2018    BILITOT <0.2 03/04/2018    ALKPHOS 25 03/04/2018    AST 10 03/04/2018    ALT 8 03/04/2018          Microbiology :  No results for input(s): BC in the last 72 hours. No results for input(s): Iris [place] in the last 72 hours. No results for input(s): LABURIN in the last 72 hours. No results for input(s): CULTRESP in the last 72 hours. No results for input(s): WNDABS in the last 72 hours. Radiology :  XR CHEST PORTABLE   Final Result   Small left pleural effusion with adjacent atelectasis and/or   infiltrates, not significantly changed. CT ABDOMEN PELVIS WO CONTRAST Additional Contrast? None   Final Result   After recent embolization of gastroduodenal artery, as above   commented. IR Angiogram Visceral Gastric   Final Result   SUCCESSFUL COIL EMBOLIZATION OF THE GASTRODUODENAL ARTERY,   AS DETAILED ABOVE. IR Angio Renal Right Selective 1 Vessel   Final Result   SUCCESSFUL COIL EMBOLIZATION OF THE GASTRODUODENAL ARTERY,   AS DETAILED ABOVE. IR Angiogram Superior Mesenteric   Final Result   SUCCESSFUL COIL EMBOLIZATION OF THE GASTRODUODENAL ARTERY,   AS DETAILED ABOVE. IR Embolization Vascular Any Hemorrhage   Final Result   SUCCESSFUL COIL EMBOLIZATION OF THE GASTRODUODENAL ARTERY,   AS DETAILED ABOVE. IR Angiogram Celiac   Final Result   SUCCESSFUL COIL EMBOLIZATION OF THE GASTRODUODENAL ARTERY,   AS DETAILED ABOVE. IR Angiogram Hepatic   Final Result   SUCCESSFUL COIL EMBOLIZATION OF THE GASTRODUODENAL ARTERY,   AS DETAILED ABOVE. XR CHEST PORTABLE   Final Result   1. Left internal jugular central venous catheter tip in the SVC. 2. No pneumothorax on the right or left. 3. Endotracheal tube in good position. 4. NG tube in good position.       5. Discrete subpleural areas of atelectasis or increased density in   the posterior costophrenic sulcus of both lower lobes. 6. No perihilar vascular congestion. XR Chest Abdomen Ng Placement   Final Result   NG tube in good position in the fundal region of the stomach, although   partially coiled. XR CHEST PORTABLE   Final Result   1. Vague, ill-defined groundglass opacity towards the right parahilar   region peripherally located. Can represent a pulmonary nodule. Further   evaluation with a CT scan of the chest, which can be done initially   without IV contrast, is recommended. 2. No superimposed acute cardiopulmonary process. 3. Good position for the endotracheal tube. 4. There is no pneumothorax on the right or on the left. ALERT:  THIS IS AN ABNORMAL REPORT. XR CHEST PORTABLE   Final Result   1. Endotracheal tube in good position just proximal to the upper   contour of the arch of the aorta. 2. NG tube in good position. 3. No acute cardiopulmonary process. XR Chest Abdomen Ng Placement   Final Result   Nonspecific bowel gas pattern with a OG tube in the stomach. CT ABDOMEN PELVIS WO CONTRAST Additional Contrast? None   Final Result      1. Motion artifacts. Suboptimally study. 2. No inflammatory changes in the abdomen or pelvis. 3. No bowel obstruction or ileus. 4. No obstructive uropathy or urinary stone. 5. Small hiatal hernia. 6. Normal appendix. 7. 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 8. Patchy airspace opacities of the right lung base may represent   infiltrates. 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             XR CHEST PORTABLE    (Results Pending)         URINARY CATHETER OUTPUT (Galvan):  Urethral Catheter Latex;Straight-tip-Output (mL): 40

## 2018-03-04 NOTE — PLAN OF CARE
Problem: Fluid Volume:  Goal: Fluid loss will decrease  Fluid loss will decrease   Outcome: Ongoing    Goal: Ability to achieve a balanced intake and output will improve  Ability to achieve a balanced intake and output will improve   Outcome: Ongoing    Goal: Maintenance of adequate hydration will improve  Maintenance of adequate hydration will improve   Outcome: Ongoing      Problem: Risk for Impaired Skin Integrity  Goal: Tissue integrity - skin and mucous membranes  Structural intactness and normal physiological function of skin and  mucous membranes.    Outcome: Met This Shift      Problem: Falls - Risk of  Goal: Absence of falls  Outcome: Met This Shift      Problem: Restraint Use - Nonviolent/Non-Self-Destructive Behavior:  Goal: Absence of restraint indications  Absence of restraint indications  Outcome: Not Met This Shift    Goal: Absence of restraint-related injury  Absence of restraint-related injury  Outcome: Met This Shift

## 2018-03-04 NOTE — PROGRESS NOTES
Surgical resident notified of low urine output x 2 hrs (15, 20 ml respectively). Verbal order received for NS 1000 ml over 1 hr. Also notified resident of low BS and intent to give D50 12.5g x1 with recheck. No further orders received.    Electronically signed by Wild Bañuelos RN on 3/3/2018 at 9:18 PM

## 2018-03-04 NOTE — PROGRESS NOTES
Blood initiated per order. Repeat BS also checked at time of blood initiation; level remains unchaged after 25g D50 per surgical resident order. Will notify.    Electronically signed by Marcela Valdez RN on 3/4/2018 at 2:59 AM

## 2018-03-04 NOTE — PROGRESS NOTES
Surgical Intensive Care Unit   Daily Progress Note     Date of admission: 3/1/2018    Reason for ICU: hemorrhagic shock from upper GI bleed    Pertinent Hospital Course Events:   3/1 Presentation to ED for acute upper GI bleed, admission to MICU  3/2 EGD with small amount of blood ins stomach, duodenum with large amount of blood and large clot, IR GDA embolization, transfer to SICU  3/3 DC nimbex, versed, sandy drips    Overnight Events: transfused 1 unit pRBCs overnight    Subjective: intubated, sedated. Physical Exam:   /62   Pulse 78   Temp 99 °F (37.2 °C) (Axillary)   Resp 25   Ht 5' 7\" (1.702 m)   Wt 225 lb 9.6 oz (102.3 kg)   SpO2 100%   BMI 35.33 kg/m²      CONSTITUTIONAL: Laying in bed, intubated, sedated  NEURO: Sedated, intubated  LUNGS: intubated on vent, CTABL  CARDIOVASCULAR: RR  ABDOMEN: soft, NT, ND, NG clear, flexi-seal with dark red blood  MUSCULOSKELETAL: 1+ edema peripherally     ASSESSMENT / PLAN:   Neuro: Intubated, sedated. FEntanyl drip used for pain control, ativan PRN used for sedation. CV: Hemorrhagic shock resolving sp 15U PRBC/8u FFP/4U platelets transfused. Monitor hemodynamics. Q6 TEGs discontinued yesterday by critical care team. Continue serial H and Hs for now given recent transfusion  Pulm: Acute respiratory failure. Monitor daily ABG/CXR. CXR ordered and still pending this AM. High risk of developing pulmonary edema. P/F ratio noted to be significantly decreased this AM.   GI: NPO, NGT. Hemorrhagic shock due to acute upper GI bleed from duodenal ulcer sp 3/2 EGD with duodenal ulcer and clot, 3/2 IR GDA embolization. Stop sandostatin gtt, continue PPI gtt. NPO for now. Continue to monitor NG tube and FMS output, discuss need and timing of repeat EGD. Continue porotonix drip given recent transfusion  Renal: No acute issues. UOP adequate. Dose of lasix given today. ID: Started on rocephin for infiltrate on CXR, Day 2. Endocrine: No acute issues.  Monitor BG   MSK: No acute issues. PTOT when able   Heme: Hemorrhagic shock related to acute upper GI bleed sp transfusion 15u PRBC, 8u FFP, 5u platelets. Continue to monitor. Bowel regimen: none at present  Pain control/Sedation: fentanyl   DVT prophylaxis: SCDs  GI: Protonix drip  Glucose protocol: SSI as needed  Mouth/Eye care: Artificial tears/peridex   Central Lines: 3/2 L IJ TLC, L radial art line  Galvan: Keep in place for critical care monitoring of fluid balance.    Patient/Family update: As available    Signed:  Albaro Barcenas  3/4/2018  10:44 AM

## 2018-03-04 NOTE — FLOWSHEET NOTE
Pt remains in restraints. Very agitated and anxious with any hands on care. Attempts to pull at lines/tubes/caregivers when restraints are discontinued. Also becomes very stiff and resists personal care requiring multiple RN's to assist. Will continue to monitor closely.

## 2018-03-04 NOTE — PROGRESS NOTES
Repeat BS at approx 2130 shows level of 85. Surgical resident notified and agreed to give second dose of 12.5 G of D50. Order upheld.

## 2018-03-05 LAB
AADO2: 153.5 MMHG
ABO/RH: NORMAL
ALBUMIN SERPL-MCNC: 1.8 G/DL (ref 3.5–5.2)
ALP BLD-CCNC: 32 U/L (ref 40–129)
ALT SERPL-CCNC: 9 U/L (ref 0–40)
ANION GAP SERPL CALCULATED.3IONS-SCNC: 9 MMOL/L (ref 7–16)
ANISOCYTOSIS: ABNORMAL
ANTIBODY SCREEN: NORMAL
AST SERPL-CCNC: 16 U/L (ref 0–39)
ATYPICAL LYMPHOCYTE RELATIVE PERCENT: 0.9 % (ref 0–4)
B.E.: 1.4 MMOL/L (ref -3–3)
BASOPHILS ABSOLUTE: 0 E9/L (ref 0–0.2)
BASOPHILS RELATIVE PERCENT: 0.3 % (ref 0–2)
BILIRUB SERPL-MCNC: 0.2 MG/DL (ref 0–1.2)
BLOOD BANK DISPENSE STATUS: NORMAL
BLOOD BANK PRODUCT CODE: NORMAL
BPU ID: NORMAL
BUN BLDV-MCNC: 31 MG/DL (ref 8–23)
CALCIUM IONIZED: 1.13 MMOL/L (ref 1.15–1.33)
CALCIUM SERPL-MCNC: 7.1 MG/DL (ref 8.6–10.2)
CHLORIDE BLD-SCNC: 108 MMOL/L (ref 98–107)
CO2: 25 MMOL/L (ref 22–29)
COHB: 0.3 % (ref 0–1.5)
CREAT SERPL-MCNC: 1.2 MG/DL (ref 0.7–1.2)
CRITICAL: ABNORMAL
DATE ANALYZED: ABNORMAL
DATE OF COLLECTION: ABNORMAL
DESCRIPTION BLOOD BANK: NORMAL
EOSINOPHILS ABSOLUTE: 0.26 E9/L (ref 0.05–0.5)
EOSINOPHILS RELATIVE PERCENT: 1.8 % (ref 0–6)
FIO2: 40 %
GFR AFRICAN AMERICAN: >60
GFR NON-AFRICAN AMERICAN: 59 ML/MIN/1.73
GLUCOSE BLD-MCNC: 95 MG/DL (ref 74–109)
HCO3: 26.1 MMOL/L (ref 22–26)
HCT VFR BLD CALC: 22 % (ref 37–54)
HCT VFR BLD CALC: 22.6 % (ref 37–54)
HCT VFR BLD CALC: 22.7 % (ref 37–54)
HEMOGLOBIN: 7.3 G/DL (ref 12.5–16.5)
HEMOGLOBIN: 7.4 G/DL (ref 12.5–16.5)
HEMOGLOBIN: 7.6 G/DL (ref 12.5–16.5)
HHB: 7 % (ref 0–5)
INR BLD: 1.1
LAB: ABNORMAL
LYMPHOCYTES ABSOLUTE: 0.74 E9/L (ref 1.5–4)
LYMPHOCYTES RELATIVE PERCENT: 4.4 % (ref 20–42)
Lab: 626
MAGNESIUM: 1.9 MG/DL (ref 1.6–2.6)
MCH RBC QN AUTO: 29.9 PG (ref 26–35)
MCHC RBC AUTO-ENTMCNC: 33.6 % (ref 32–34.5)
MCV RBC AUTO: 89 FL (ref 80–99.9)
METAMYELOCYTES RELATIVE PERCENT: 0.9 % (ref 0–1)
METER GLUCOSE: 100 MG/DL (ref 70–110)
METER GLUCOSE: 107 MG/DL (ref 70–110)
METER GLUCOSE: 114 MG/DL (ref 70–110)
METER GLUCOSE: 89 MG/DL (ref 70–110)
METER GLUCOSE: 90 MG/DL (ref 70–110)
METER GLUCOSE: 94 MG/DL (ref 70–110)
METHB: 1.1 % (ref 0–1.5)
MODE: AC
MONOCYTES ABSOLUTE: 0.88 E9/L (ref 0.1–0.95)
MONOCYTES RELATIVE PERCENT: 6.2 % (ref 2–12)
NEUTROPHILS ABSOLUTE: 12.79 E9/L (ref 1.8–7.3)
NEUTROPHILS RELATIVE PERCENT: 85.8 % (ref 43–80)
O2 CONTENT: 10.5 ML/DL
O2 SATURATION: 92.9 % (ref 92–98.5)
O2HB: 91.6 % (ref 94–97)
OPERATOR ID: 2464
PATIENT TEMP: 37 C
PCO2: 41.8 MMHG (ref 35–45)
PDW BLD-RTO: 15.5 FL (ref 11.5–15)
PEEP/CPAP: 5 CMH?O
PFO2: 1.84 MMHG/%
PH BLOOD GAS: 7.41 (ref 7.35–7.45)
PHOSPHORUS: 2 MG/DL (ref 2.5–4.5)
PLATELET # BLD: 101 E9/L (ref 130–450)
PMV BLD AUTO: 11.3 FL (ref 7–12)
PO2: 73.6 MMHG (ref 60–100)
POLYCHROMASIA: ABNORMAL
POTASSIUM REFLEX MAGNESIUM: 3.6 MMOL/L (ref 3.5–5)
PROTHROMBIN TIME: 12.1 SEC (ref 9.3–12.4)
RBC # BLD: 2.54 E12/L (ref 3.8–5.8)
RI(T): 209 %
RR MECHANICAL: 25 B/MIN
SODIUM BLD-SCNC: 142 MMOL/L (ref 132–146)
SOURCE, BLOOD GAS: ABNORMAL
THB: 8.1 G/DL (ref 11.5–16.5)
TIME ANALYZED: 627
TOTAL PROTEIN: 3.6 G/DL (ref 6.4–8.3)
VT MECHANICAL: 550 ML
WBC # BLD: 14.7 E9/L (ref 4.5–11.5)

## 2018-03-05 PROCEDURE — 83735 ASSAY OF MAGNESIUM: CPT

## 2018-03-05 PROCEDURE — 86900 BLOOD TYPING SEROLOGIC ABO: CPT

## 2018-03-05 PROCEDURE — 80053 COMPREHEN METABOLIC PANEL: CPT

## 2018-03-05 PROCEDURE — 94003 VENT MGMT INPAT SUBQ DAY: CPT

## 2018-03-05 PROCEDURE — 87450 CHARGE CONVERSION: CPT

## 2018-03-05 PROCEDURE — 86850 RBC ANTIBODY SCREEN: CPT

## 2018-03-05 PROCEDURE — 86901 BLOOD TYPING SEROLOGIC RH(D): CPT

## 2018-03-05 PROCEDURE — 82330 ASSAY OF CALCIUM: CPT

## 2018-03-05 PROCEDURE — 87205 SMEAR GRAM STAIN: CPT

## 2018-03-05 PROCEDURE — 85014 HEMATOCRIT: CPT

## 2018-03-05 PROCEDURE — 85025 COMPLETE CBC W/AUTO DIFF WBC: CPT

## 2018-03-05 PROCEDURE — 9990 CHARGE CONVERSION

## 2018-03-05 PROCEDURE — 36592 COLLECT BLOOD FROM PICC: CPT

## 2018-03-05 PROCEDURE — 85610 PROTHROMBIN TIME: CPT

## 2018-03-05 PROCEDURE — 99291 CRITICAL CARE FIRST HOUR: CPT | Performed by: SURGERY

## 2018-03-05 PROCEDURE — 82962 GLUCOSE BLOOD TEST: CPT

## 2018-03-05 PROCEDURE — 85018 HEMOGLOBIN: CPT

## 2018-03-05 PROCEDURE — C9113 INJ PANTOPRAZOLE SODIUM, VIA: HCPCS

## 2018-03-05 PROCEDURE — 36415 COLL VENOUS BLD VENIPUNCTURE: CPT

## 2018-03-05 PROCEDURE — 82805 BLOOD GASES W/O2 SATURATION: CPT

## 2018-03-05 PROCEDURE — 84100 ASSAY OF PHOSPHORUS: CPT

## 2018-03-05 PROCEDURE — 87070 CULTURE OTHR SPECIMN AEROBIC: CPT

## 2018-03-05 PROCEDURE — 71045 X-RAY EXAM CHEST 1 VIEW: CPT

## 2018-03-05 RX ORDER — LORAZEPAM 2 MG/ML
1 INJECTION INTRAMUSCULAR EVERY 4 HOURS PRN
Status: DISCONTINUED | OUTPATIENT
Start: 2018-03-05 | End: 2018-03-07

## 2018-03-05 RX ORDER — PANTOPRAZOLE SODIUM 40 MG/10ML
40 INJECTION, POWDER, LYOPHILIZED, FOR SOLUTION INTRAVENOUS 2 TIMES DAILY
Status: DISCONTINUED | OUTPATIENT
Start: 2018-03-05 | End: 2018-03-11

## 2018-03-05 RX ORDER — MIDAZOLAM HYDROCHLORIDE 1 MG/ML
2 INJECTION INTRAMUSCULAR; INTRAVENOUS ONCE
Status: COMPLETED | OUTPATIENT
Start: 2018-03-05 | End: 2018-03-05

## 2018-03-05 RX ORDER — 0.9 % SODIUM CHLORIDE 0.9 %
10 VIAL (ML) INJECTION 2 TIMES DAILY
Status: DISCONTINUED | OUTPATIENT
Start: 2018-03-05 | End: 2018-03-11

## 2018-03-05 RX ORDER — MIDAZOLAM HYDROCHLORIDE 1 MG/ML
INJECTION INTRAMUSCULAR; INTRAVENOUS
Status: COMPLETED
Start: 2018-03-05 | End: 2018-03-05

## 2018-03-05 RX ORDER — FUROSEMIDE 10 MG/ML
20 INJECTION INTRAMUSCULAR; INTRAVENOUS 2 TIMES DAILY
Status: COMPLETED | OUTPATIENT
Start: 2018-03-05 | End: 2018-03-05

## 2018-03-05 RX ADMIN — LORAZEPAM 1 MG: 2 INJECTION INTRAMUSCULAR at 16:11

## 2018-03-05 RX ADMIN — MIDAZOLAM HYDROCHLORIDE 4 MG: 1 INJECTION INTRAMUSCULAR; INTRAVENOUS at 05:05

## 2018-03-05 RX ADMIN — MINERAL OIL AND WHITE PETROLATUM: 150; 830 OINTMENT OPHTHALMIC at 13:09

## 2018-03-05 RX ADMIN — Medication 10 ML: at 20:18

## 2018-03-05 RX ADMIN — Medication 10 ML: at 20:32

## 2018-03-05 RX ADMIN — MINERAL OIL AND WHITE PETROLATUM: 150; 830 OINTMENT OPHTHALMIC at 22:07

## 2018-03-05 RX ADMIN — LORAZEPAM 1 MG: 2 INJECTION INTRAMUSCULAR at 01:49

## 2018-03-05 RX ADMIN — MINERAL OIL AND WHITE PETROLATUM: 150; 830 OINTMENT OPHTHALMIC at 02:00

## 2018-03-05 RX ADMIN — LORAZEPAM 1 MG: 2 INJECTION INTRAMUSCULAR at 20:17

## 2018-03-05 RX ADMIN — Medication 10 ML: at 20:31

## 2018-03-05 RX ADMIN — MINERAL OIL AND WHITE PETROLATUM: 150; 830 OINTMENT OPHTHALMIC at 05:51

## 2018-03-05 RX ADMIN — Medication 10 ML: at 09:26

## 2018-03-05 RX ADMIN — Medication 10 ML: at 08:16

## 2018-03-05 RX ADMIN — LORAZEPAM 1 MG: 2 INJECTION INTRAMUSCULAR at 08:52

## 2018-03-05 RX ADMIN — CHLORHEXIDINE GLUCONATE 15 ML: 0.12 RINSE ORAL at 20:17

## 2018-03-05 RX ADMIN — PANTOPRAZOLE SODIUM 40 MG: 40 INJECTION, POWDER, FOR SOLUTION INTRAVENOUS at 09:26

## 2018-03-05 RX ADMIN — FUROSEMIDE 20 MG: 10 INJECTION INTRAMUSCULAR; INTRAVENOUS at 17:32

## 2018-03-05 RX ADMIN — PANTOPRAZOLE SODIUM 40 MG: 40 INJECTION, POWDER, FOR SOLUTION INTRAVENOUS at 20:32

## 2018-03-05 RX ADMIN — CHLORHEXIDINE GLUCONATE 15 ML: 0.12 RINSE ORAL at 08:18

## 2018-03-05 RX ADMIN — FUROSEMIDE 20 MG: 10 INJECTION INTRAMUSCULAR; INTRAVENOUS at 09:28

## 2018-03-05 RX ADMIN — MINERAL OIL AND WHITE PETROLATUM: 150; 830 OINTMENT OPHTHALMIC at 09:18

## 2018-03-05 ASSESSMENT — PULMONARY FUNCTION TESTS
PIF_VALUE: 16
PIF_VALUE: 34
PIF_VALUE: 16
PIF_VALUE: 23
PIF_VALUE: 20
PIF_VALUE: 31
PIF_VALUE: 22
PIF_VALUE: 16
PIF_VALUE: 15
PIF_VALUE: 16
PIF_VALUE: 39
PIF_VALUE: 16
PIF_VALUE: 23
PIF_VALUE: 16
PIF_VALUE: 16
PIF_VALUE: 15
PIF_VALUE: 16
PIF_VALUE: 37
PIF_VALUE: 31
PIF_VALUE: 15
PIF_VALUE: 15

## 2018-03-05 ASSESSMENT — PAIN SCALES - GENERAL
PAINLEVEL_OUTOF10: 4
PAINLEVEL_OUTOF10: 0
PAINLEVEL_OUTOF10: 2
PAINLEVEL_OUTOF10: 0
PAINLEVEL_OUTOF10: 0
PAINLEVEL_OUTOF10: 3

## 2018-03-05 NOTE — FLOWSHEET NOTE
Pt reaches for lines and tubes . Fails to redirect to verbal commands. Restraints secured for patient safety.

## 2018-03-05 NOTE — PROGRESS NOTES
2018    LABALBU 1.8 2018    CALCIUM 7.1 2018    BILITOT 0.2 2018    ALKPHOS 32 2018    AST 16 2018    ALT 9 2018          Microbiology :    Radiology :    Assessment:  · Influenza B uncomplicated  · Hypovolemic shock from acute GI bleed s/p gastroduodenal art embolisation  · Suspected pneumonia- no old CXR to compare     Plan:    · intravenous peramivir had 1 dose  · Day 3 of Ceftriaxone 2g qdaily for CAP, get strep pna , urine leg ag, sputum cx  · resp culture follow up       Electronically signed by Dmitry Villegas MD on 3/5/2018 at 11:19 AM

## 2018-03-05 NOTE — PLAN OF CARE
Problem: Fluid Volume:  Goal: Fluid loss will decrease  Fluid loss will decrease   Outcome: Met This Shift  Discussed plan of care with patient and family. Patient and family included in plan of care. Goal: Ability to achieve a balanced intake and output will improve  Ability to achieve a balanced intake and output will improve   Outcome: Met This Shift  Discussed plan of care with patient and family. Patient and family included in plan of care. Goal: Maintenance of adequate hydration will improve  Maintenance of adequate hydration will improve   Outcome: Met This Shift  Discussed plan of care with patient and family. Patient and family included in plan of care. Problem: Risk for Impaired Skin Integrity  Goal: Tissue integrity - skin and mucous membranes  Structural intactness and normal physiological function of skin and  mucous membranes. Outcome: Met This Shift      Problem: Falls - Risk of  Goal: Absence of falls  Outcome: Met This Shift  Discussed plan of care with patient and family. Patient and family included in plan of care. Problem: Restraint Use - Nonviolent/Non-Self-Destructive Behavior:  Goal: Absence of restraint indications  Absence of restraint indications   Outcome: Not Met This Shift  Discussed plan of care with patient and family. Patient and family included in plan of care. Goal: Absence of restraint-related injury  Absence of restraint-related injury   Outcome: Met This Shift  Discussed plan of care with patient and family. Patient and family included in plan of care.

## 2018-03-05 NOTE — PROGRESS NOTES
Nutrition Assessment    Type and Reason for Visit: Initial, Consult    Nutrition Recommendations: Continue current Tube Feeding   Recommend to continue current trickle feed, monitor for possible refeeding syndrome, advance with tolerance. Goal tube feed recommendation (when needed): Standard with Fiber 1.5 @1200ml TV w/ protein modular daily to provide: 1800kcals, 76gm pro, 912ml water (with protein modular daily: 1900kcals, 102gm pro)    Malnutrition Assessment:  · Malnutrition Status: At risk for malnutrition  · Context: Acute illness or injury  · Findings of the 6 clinical characteristics of malnutrition (Minimum of 2 out of 6 clinical characteristics is required to make the diagnosis of moderate or severe Protein Calorie Malnutrition based on AND/ASPEN Guidelines):  1. Energy Intake-Less than or equal to 50%, greater than 7 days    2. Weight Loss-Unable to assess, unable to assess  3. Fat Loss-No significant subcutaneous fat loss,    4. Muscle Loss-No significant muscle mass loss,    5. Fluid Accumulation-Mild fluid accumulation,    6.   Strength-Not measured    Nutrition Diagnosis:   · Problem: Inadequate oral intake  · Etiology: related to Impaired respiratory function-inability to consume food     Signs and symptoms:  as evidenced by NPO status due to medical condition, Intubation, Nutrition support - EN    Nutrition Assessment:  · Nutrition-Focused Physical Findings: active BS, soft abd, no noted edema, + I/Os, vent, NGT  · Wound Type: Multiple (abrasions)  · Current Nutrition Therapies:  · Oral Diet Orders: NPO   · Oral Diet intake: NPO (noted poor po intake PTA)  · Tube Feeding (TF) Orders:   · Feeding Route: Nasogastric  · Formula: Standard w/Fiber  · Rate (ml/hr):20ml/hr    · Volume (ml/day): 480ml TV   · Duration: Continuous 24hrs  · TF Residuals: Less than or equal to 250ml  · Current TF & Flush Orders Provides: 576kcals, 27gm pro, 387ml water   · Anthropometric Measures:  · Ht: 5' 7\" (170.2 cm)   · Current Body Wt: 237 lb (107.5 kg) (3/5 actual)  · Admission Body Wt: 196 lb (88.9 kg) (3/2 actual)  · Usual Body Wt:  (UTO)  · % Weight Change: noted wt gain since adm, current fluids +15L at this time; unable to assess overall wt hx- no prev EMR,     · Ideal Body Wt: 148 lb (67.1 kg), % Ideal Body 132% (using adm wt d/t +fld balance)  · Adjusted Body Wt: 160 lb (72.6 kg) (using adm wt d/t +fld balance), body weight adjusted for Obesity  · BMI Classification: BMI 30.0 - 34.9 Obese Class I (adm wt BMI 30.4)  · Comparative Standards (Estimated Nutrition Needs):  · Estimated Daily Total Kcal:  (PS10 Tmax 37.2, MV 10.7; 1883)  · Estimated Daily Protein (g):  (1.2-1.4gm/kg adjusted wt)    Estimated Intake vs Estimated Needs: Intake Less Than Needs    Nutrition Risk Level: High    Nutrition Interventions:   Continued Inpatient Monitoring, Education not appropriate at this time, Coordination of Care    Nutrition Evaluation:   · Evaluation: Goals set   · Goals: Pt to tolerate trickle feed    · Monitoring: TF Intake, TF Tolerance, Gastric Residuals, Skin Integrity, Wound Healing, Fluid Balance, Mental Status/Confusion, Weight, Comparative Standards, Pertinent Labs    See Adult Nutrition Doc Flowsheet for more detail.      Electronically signed by Carlitos Callejas RD, GEORGES on 3/5/18 at 11:19 AM  Contact Number: [phone]

## 2018-03-05 NOTE — PROGRESS NOTES
continuous ICU monitoring because the patient is at risk for deterioration from a hemorrhagic shock standpoint.     Matti San MD, FACS  3/5/2018  9:23 AM

## 2018-03-05 NOTE — PLAN OF CARE
Problem: Restraint Use - Nonviolent/Non-Self-Destructive Behavior:  Goal: Absence of restraint indications  Absence of restraint indications   Outcome: Not Met This Shift    Goal: Absence of restraint-related injury  Absence of restraint-related injury   Outcome: Met This Shift

## 2018-03-05 NOTE — FLOWSHEET NOTE
Patient attempts to reach for ETT and lines necessary for care when unrestrained. Unable to be redirected. Bilateral soft wrist restraints will be continued for patient safety.    Electronically signed by Edith Bettencourt RN on 3/5/2018 at 08:00 AM.

## 2018-03-05 NOTE — FLOWSHEET NOTE
Patient attempts to reach for ETT and lines necessary for care when unrestrained. Unable to be redirected. Bilateral soft wrist restraints will be continued for patient safety.    Electronically signed by Adelaide Camilo RN on 3/5/2018 at 12:00 PM.

## 2018-03-06 LAB
AADO2: 132.2 MMHG
ALBUMIN SERPL-MCNC: 1.8 G/DL (ref 3.5–5.2)
ALP BLD-CCNC: 33 U/L (ref 40–129)
ALT SERPL-CCNC: 12 U/L (ref 0–40)
ANION GAP SERPL CALCULATED.3IONS-SCNC: 8 MMOL/L (ref 7–16)
ANISOCYTOSIS: ABNORMAL
AST SERPL-CCNC: 22 U/L (ref 0–39)
B.E.: 7.7 MMOL/L (ref -3–3)
BASOPHILS ABSOLUTE: 0.02 E9/L (ref 0–0.2)
BASOPHILS RELATIVE PERCENT: 0.2 % (ref 0–2)
BILIRUB SERPL-MCNC: 0.2 MG/DL (ref 0–1.2)
BLOOD BANK DISPENSE STATUS: NORMAL
BLOOD BANK PRODUCT CODE: NORMAL
BLOOD CULTURE, ROUTINE: NORMAL
BPU ID: NORMAL
BUN BLDV-MCNC: 19 MG/DL (ref 8–23)
CALCIUM IONIZED: 1.12 MMOL/L (ref 1.15–1.33)
CALCIUM SERPL-MCNC: 6.8 MG/DL (ref 8.6–10.2)
CHLORIDE BLD-SCNC: 108 MMOL/L (ref 98–107)
CO2: 32 MMOL/L (ref 22–29)
COHB: 1.8 % (ref 0–1.5)
CREAT SERPL-MCNC: 1.1 MG/DL (ref 0.7–1.2)
CRITICAL: ABNORMAL
CULTURE, BLOOD 2: NORMAL
DATE ANALYZED: ABNORMAL
DATE OF COLLECTION: ABNORMAL
DESCRIPTION BLOOD BANK: NORMAL
EOSINOPHILS ABSOLUTE: 0.32 E9/L (ref 0.05–0.5)
EOSINOPHILS RELATIVE PERCENT: 2.7 % (ref 0–6)
FIO2: 40 %
GFR AFRICAN AMERICAN: >60
GFR NON-AFRICAN AMERICAN: >60 ML/MIN/1.73
GLUCOSE BLD-MCNC: 79 MG/DL (ref 74–109)
HCO3: 32.5 MMOL/L (ref 22–26)
HCT VFR BLD CALC: 19.8 % (ref 37–54)
HCT VFR BLD CALC: 24.8 % (ref 37–54)
HEMOGLOBIN: 6.5 G/DL (ref 12.5–16.5)
HEMOGLOBIN: 8 G/DL (ref 12.5–16.5)
HHB: 3.1 % (ref 0–5)
IMMATURE GRANULOCYTES #: 0.91 E9/L
IMMATURE GRANULOCYTES %: 7.6 % (ref 0–5)
INR BLD: 1.3
L. PNEUMOPHILA SEROGP 1 UR AG: NORMAL
LAB: ABNORMAL
LYMPHOCYTES ABSOLUTE: 1.97 E9/L (ref 1.5–4)
LYMPHOCYTES RELATIVE PERCENT: 16.6 % (ref 20–42)
Lab: 541
MAGNESIUM: 1.7 MG/DL (ref 1.6–2.6)
MCH RBC QN AUTO: 30 PG (ref 26–35)
MCHC RBC AUTO-ENTMCNC: 32.8 % (ref 32–34.5)
MCV RBC AUTO: 91.2 FL (ref 80–99.9)
METER GLUCOSE: 100 MG/DL (ref 70–110)
METER GLUCOSE: 100 MG/DL (ref 70–110)
METER GLUCOSE: 119 MG/DL (ref 70–110)
METER GLUCOSE: 81 MG/DL (ref 70–110)
METER GLUCOSE: 92 MG/DL (ref 70–110)
METER GLUCOSE: 99 MG/DL (ref 70–110)
METHB: 0.6 % (ref 0–1.5)
MODE: ABNORMAL
MONOCYTES ABSOLUTE: 1.11 E9/L (ref 0.1–0.95)
MONOCYTES RELATIVE PERCENT: 9.3 % (ref 2–12)
NEUTROPHILS ABSOLUTE: 7.57 E9/L (ref 1.8–7.3)
NEUTROPHILS RELATIVE PERCENT: 63.6 % (ref 43–80)
O2 CONTENT: 9.2 ML/DL
O2 SATURATION: 96.8 % (ref 92–98.5)
O2HB: 94.5 % (ref 94–97)
OPERATOR ID: ABNORMAL
PATIENT TEMP: 37 C
PCO2: 48 MMHG (ref 35–45)
PDW BLD-RTO: 15.5 FL (ref 11.5–15)
PEEP/CPAP: 5 CMH?O
PFO2: 2.16 MMHG/%
PH BLOOD GAS: 7.45 (ref 7.35–7.45)
PHOSPHORUS: 2.3 MG/DL (ref 2.5–4.5)
PLATELET # BLD: 114 E9/L (ref 130–450)
PMV BLD AUTO: 10.8 FL (ref 7–12)
PO2: 86.6 MMHG (ref 60–100)
POLYCHROMASIA: ABNORMAL
POTASSIUM REFLEX MAGNESIUM: 3.2 MMOL/L (ref 3.5–5)
PROTHROMBIN TIME: 14.8 SEC (ref 9.3–12.4)
PS: 10 CMH20
RBC # BLD: 2.17 E12/L (ref 3.8–5.8)
RI(T): 153 %
SODIUM BLD-SCNC: 148 MMOL/L (ref 132–146)
SOURCE, BLOOD GAS: ABNORMAL
STREP PNEUMONIAE ANTIGEN, URINE: NORMAL
THB: 6.8 G/DL (ref 11.5–16.5)
TIME ANALYZED: 541
TOTAL PROTEIN: 3.6 G/DL (ref 6.4–8.3)
WBC # BLD: 11.9 E9/L (ref 4.5–11.5)

## 2018-03-06 PROCEDURE — 43235 EGD DIAGNOSTIC BRUSH WASH: CPT | Performed by: SURGERY

## 2018-03-06 PROCEDURE — 82330 ASSAY OF CALCIUM: CPT

## 2018-03-06 PROCEDURE — 83735 ASSAY OF MAGNESIUM: CPT

## 2018-03-06 PROCEDURE — 85610 PROTHROMBIN TIME: CPT

## 2018-03-06 PROCEDURE — 36592 COLLECT BLOOD FROM PICC: CPT

## 2018-03-06 PROCEDURE — 0HBRXZZ EXCISION OF TOE NAIL, EXTERNAL APPROACH: ICD-10-PCS | Performed by: PODIATRIST

## 2018-03-06 PROCEDURE — 0DJ08ZZ INSPECTION OF UPPER INTESTINAL TRACT, VIA NATURAL OR ARTIFICIAL OPENING ENDOSCOPIC: ICD-10-PCS | Performed by: SURGERY

## 2018-03-06 PROCEDURE — 84100 ASSAY OF PHOSPHORUS: CPT

## 2018-03-06 PROCEDURE — 9990 CHARGE CONVERSION

## 2018-03-06 PROCEDURE — P9016 RBC LEUKOCYTES REDUCED: HCPCS

## 2018-03-06 PROCEDURE — 82805 BLOOD GASES W/O2 SATURATION: CPT

## 2018-03-06 PROCEDURE — C9113 INJ PANTOPRAZOLE SODIUM, VIA: HCPCS

## 2018-03-06 PROCEDURE — 86923 COMPATIBILITY TEST ELECTRIC: CPT

## 2018-03-06 PROCEDURE — 99291 CRITICAL CARE FIRST HOUR: CPT | Performed by: SURGERY

## 2018-03-06 PROCEDURE — 85014 HEMATOCRIT: CPT

## 2018-03-06 PROCEDURE — 36430 TRANSFUSION BLD/BLD COMPNT: CPT

## 2018-03-06 PROCEDURE — 85018 HEMOGLOBIN: CPT

## 2018-03-06 PROCEDURE — 71045 X-RAY EXAM CHEST 1 VIEW: CPT

## 2018-03-06 PROCEDURE — 85025 COMPLETE CBC W/AUTO DIFF WBC: CPT

## 2018-03-06 PROCEDURE — 82962 GLUCOSE BLOOD TEST: CPT

## 2018-03-06 PROCEDURE — 36415 COLL VENOUS BLD VENIPUNCTURE: CPT

## 2018-03-06 PROCEDURE — 80053 COMPREHEN METABOLIC PANEL: CPT

## 2018-03-06 PROCEDURE — 94003 VENT MGMT INPAT SUBQ DAY: CPT

## 2018-03-06 RX ORDER — MAGNESIUM SULFATE IN WATER 40 MG/ML
2 INJECTION, SOLUTION INTRAVENOUS ONCE
Status: COMPLETED | OUTPATIENT
Start: 2018-03-06 | End: 2018-03-06

## 2018-03-06 RX ORDER — HYDRALAZINE HYDROCHLORIDE 20 MG/ML
10 INJECTION INTRAMUSCULAR; INTRAVENOUS EVERY 30 MIN PRN
Status: DISCONTINUED | OUTPATIENT
Start: 2018-03-06 | End: 2018-03-11

## 2018-03-06 RX ORDER — FENTANYL CITRATE 50 UG/ML
200 INJECTION, SOLUTION INTRAMUSCULAR; INTRAVENOUS ONCE
Status: COMPLETED | OUTPATIENT
Start: 2018-03-06 | End: 2018-03-06

## 2018-03-06 RX ORDER — MIDAZOLAM HYDROCHLORIDE 1 MG/ML
8 INJECTION INTRAMUSCULAR; INTRAVENOUS ONCE
Status: COMPLETED | OUTPATIENT
Start: 2018-03-06 | End: 2018-03-06

## 2018-03-06 RX ORDER — 0.9 % SODIUM CHLORIDE 0.9 %
250 INTRAVENOUS SOLUTION INTRAVENOUS ONCE
Status: DISCONTINUED | OUTPATIENT
Start: 2018-03-06 | End: 2018-03-07

## 2018-03-06 RX ORDER — POTASSIUM CHLORIDE 20MEQ/15ML
40 LIQUID (ML) ORAL 2 TIMES DAILY
Status: COMPLETED | OUTPATIENT
Start: 2018-03-06 | End: 2018-03-06

## 2018-03-06 RX ORDER — FUROSEMIDE 10 MG/ML
20 INJECTION INTRAMUSCULAR; INTRAVENOUS 2 TIMES DAILY
Status: DISCONTINUED | OUTPATIENT
Start: 2018-03-06 | End: 2018-03-06

## 2018-03-06 RX ORDER — LABETALOL HYDROCHLORIDE 5 MG/ML
10 INJECTION, SOLUTION INTRAVENOUS EVERY 30 MIN PRN
Status: DISCONTINUED | OUTPATIENT
Start: 2018-03-06 | End: 2018-03-11

## 2018-03-06 RX ADMIN — Medication 40 MEQ: at 20:28

## 2018-03-06 RX ADMIN — PANTOPRAZOLE SODIUM 40 MG: 40 INJECTION, POWDER, FOR SOLUTION INTRAVENOUS at 20:19

## 2018-03-06 RX ADMIN — MINERAL OIL AND WHITE PETROLATUM: 150; 830 OINTMENT OPHTHALMIC at 08:52

## 2018-03-06 RX ADMIN — MAGNESIUM SULFATE IN WATER 2 G: 40 INJECTION, SOLUTION INTRAVENOUS at 06:30

## 2018-03-06 RX ADMIN — FUROSEMIDE 20 MG: 10 INJECTION INTRAMUSCULAR; INTRAVENOUS at 08:39

## 2018-03-06 RX ADMIN — LORAZEPAM 1 MG: 2 INJECTION INTRAMUSCULAR at 20:40

## 2018-03-06 RX ADMIN — Medication 10 ML: at 20:20

## 2018-03-06 RX ADMIN — Medication 10 ML: at 08:40

## 2018-03-06 RX ADMIN — FENTANYL CITRATE 200 MCG: 50 INJECTION, SOLUTION INTRAMUSCULAR; INTRAVENOUS at 12:53

## 2018-03-06 RX ADMIN — LORAZEPAM 1 MG: 2 INJECTION INTRAMUSCULAR at 08:39

## 2018-03-06 RX ADMIN — CHLORHEXIDINE GLUCONATE 15 ML: 0.12 RINSE ORAL at 20:19

## 2018-03-06 RX ADMIN — Medication 10 ML: at 20:19

## 2018-03-06 RX ADMIN — MINERAL OIL AND WHITE PETROLATUM: 150; 830 OINTMENT OPHTHALMIC at 06:47

## 2018-03-06 RX ADMIN — MIDAZOLAM HYDROCHLORIDE 8 MG: 1 INJECTION INTRAMUSCULAR; INTRAVENOUS at 12:52

## 2018-03-06 RX ADMIN — MINERAL OIL AND WHITE PETROLATUM: 150; 830 OINTMENT OPHTHALMIC at 14:23

## 2018-03-06 RX ADMIN — Medication 40 MEQ: at 08:35

## 2018-03-06 RX ADMIN — CHLORHEXIDINE GLUCONATE 15 ML: 0.12 RINSE ORAL at 08:54

## 2018-03-06 RX ADMIN — PANTOPRAZOLE SODIUM 40 MG: 40 INJECTION, POWDER, FOR SOLUTION INTRAVENOUS at 08:40

## 2018-03-06 RX ADMIN — LORAZEPAM 1 MG: 2 INJECTION INTRAMUSCULAR at 01:28

## 2018-03-06 RX ADMIN — Medication 10 ML: at 08:41

## 2018-03-06 ASSESSMENT — PULMONARY FUNCTION TESTS
PIF_VALUE: 16
PIF_VALUE: 17
PIF_VALUE: 16
PIF_VALUE: 15
PIF_VALUE: 16
PIF_VALUE: 15
PIF_VALUE: 16
PIF_VALUE: 15
PIF_VALUE: 15
PIF_VALUE: 16
PIF_VALUE: 15
PIF_VALUE: 16
PIF_VALUE: 16
PIF_VALUE: 15
PIF_VALUE: 15
PIF_VALUE: 16
PIF_VALUE: 15
PIF_VALUE: 16
PIF_VALUE: 15
PIF_VALUE: 15
PIF_VALUE: 16
PIF_VALUE: 15
PIF_VALUE: 16
PIF_VALUE: 15

## 2018-03-06 ASSESSMENT — PAIN SCALES - GENERAL: PAINLEVEL_OUTOF10: 0

## 2018-03-06 NOTE — PROGRESS NOTES
Patient seen earlier today  More awake and alert on vent  RRR  Diminished breath sounds  No apparent neuro deficit  Hg trending down again  Discussed with wife again at bedside

## 2018-03-06 NOTE — PROGRESS NOTES
[facility]  SURGICAL INTENSIVE CARE UNIT (SICU)  ATTENDING PHYSICIAN CRITICAL CARE PROGRESS NOTE     I have examined the patient, reviewed the record, and discussed the case with the APN/  Resident. I have reviewed all relevant labs and imaging data. Please refer to the  APN/ resident's note. I agree with the  assessment and plan with the following corrections/ additions. The following summarizes my clinical findings and independent assessment. CC: Bleeding from [address]  3/2 embolization of GDA   Required massive transfusion protocol    S. Pt is following commands on PS. Plan on repeat egd today    EXAM:  Intubated/ sedated   Follows commands on vent  PERRL  S1S2  Abdomen soft nt  nd  Rt groin dressed        ASSESSMENT:  Principal Problem:    Rectal bleeding  Active Problems:    HCAP (healthcare-associated pneumonia)    Melena    Acute blood loss anemia  Resolved Problems:    * No resolved hospital problems.  *       PLAN:  Sedation/ Pain: on fentanyl gtt at 75 mcg/ hr and prn ativan    CV:   Hemorrhagic Shock Class IV Resolved  Bleeding appears to be controlled after embolization of the GDA on 3/2  Repeat EGD today    Pulmonary:   Positive for Influenza B  On Vent--continue PS trials  Tentative plan on extuabtion today    GI: NPO s/p bleeding ulcer requiring GDA embolization and massive transfusion  Hold TF for EGD to re-evaluate bleeding ulcer       FEN: Acute kidney injury resolving     Overall fluid balance + 11L    -4L yesterday  Responding to diuresis    ID: Positive for Influenza B  Continue supportive care    Heme: Thrombocytopenia--consumptive due to bleeding   platelets improving    Endo: Continue Sliding Scale    DVT prophylaxis--SCDS, anticoagulation contraindicated due to bleeding  GI Prophylaxis--PPI  Lines--central line 3/1, arterial line 3/2  CODE: FULL     DISPOSITION-Continue ICU Care    Critical care time exclusive of teaching and procedures = 38min     Pt needs continuous ICU monitoring because the patient is at risk for deterioration from a hemorrhagic shock standpoint.     Roland Monique MD, FACS  3/6/2018  10:14 AM

## 2018-03-06 NOTE — PROGRESS NOTES
ALKPHOS 33 03/06/2018    AST 22 03/06/2018    ALT 12 03/06/2018      Radiology :    Assessment:  · Influenza B uncomplicated  · Hypovolemic shock from acute GI bleed s/p gastroduodenal art embolisation  · Suspected pneumonia- no old CXR to compare     Plan:    · intravenous peramivir had 1 dose  · Day 4 of Ceftriaxone 2g qdaily for CAP-will DC once extubated  · And DC droplet isolation once patient is extubated  · resp culture follow up       Electronically signed by Justine Moran MD on 3/6/2018 at 3:22 PM

## 2018-03-07 LAB
AADO2: 131.4 MMHG
ALBUMIN SERPL-MCNC: 1.8 G/DL (ref 3.5–5.2)
ALP BLD-CCNC: 39 U/L (ref 40–129)
ALT SERPL-CCNC: 12 U/L (ref 0–40)
ANION GAP SERPL CALCULATED.3IONS-SCNC: 7 MMOL/L (ref 7–16)
ANISOCYTOSIS: ABNORMAL
AST SERPL-CCNC: 22 U/L (ref 0–39)
B.E.: -0.1 MMOL/L (ref -3–3)
B.E.: -0.7 MMOL/L (ref -3–3)
B.E.: 5.7 MMOL/L (ref -3–3)
BASOPHILS ABSOLUTE: 0 E9/L (ref 0–0.2)
BASOPHILS RELATIVE PERCENT: 0.2 % (ref 0–2)
BILIRUB SERPL-MCNC: 0.3 MG/DL (ref 0–1.2)
BUN BLDV-MCNC: 14 MG/DL (ref 8–23)
CALCIUM IONIZED: 1.11 MMOL/L (ref 1.15–1.33)
CALCIUM SERPL-MCNC: 6.8 MG/DL (ref 8.6–10.2)
CHLORIDE BLD-SCNC: 105 MMOL/L (ref 98–107)
CO2: 31 MMOL/L (ref 22–29)
COHB: 0.4 % (ref 0–1.5)
COHB: 0.5 % (ref 0–1.5)
COHB: 1.7 % (ref 0–1.5)
CREAT SERPL-MCNC: 1.1 MG/DL (ref 0.7–1.2)
CRITICAL: ABNORMAL
CULTURE, RESPIRATORY: ABNORMAL
CULTURE, RESPIRATORY: ABNORMAL
DATE ANALYZED: ABNORMAL
DATE OF COLLECTION: ABNORMAL
EOSINOPHILS ABSOLUTE: 0.24 E9/L (ref 0.05–0.5)
EOSINOPHILS RELATIVE PERCENT: 1.8 % (ref 0–6)
FIO2: 40 %
GFR AFRICAN AMERICAN: >60
GFR NON-AFRICAN AMERICAN: >60 ML/MIN/1.73
GLUCOSE BLD-MCNC: 113 MG/DL (ref 74–109)
HCO3: 24.3 MMOL/L (ref 22–26)
HCO3: 24.8 MMOL/L (ref 22–26)
HCO3: 30.8 MMOL/L (ref 22–26)
HCT VFR BLD CALC: 22.6 % (ref 37–54)
HEMOGLOBIN: 7.1 G/DL (ref 12.5–16.5)
HHB: 0.5 % (ref 0–5)
HHB: 1.6 % (ref 0–5)
HHB: 3.6 % (ref 0–5)
INR BLD: 1.2
LAB: ABNORMAL
LYMPHOCYTES ABSOLUTE: 1.32 E9/L (ref 1.5–4)
LYMPHOCYTES RELATIVE PERCENT: 9.7 % (ref 20–42)
Lab: 2140
Lab: 2333
Lab: 515
MAGNESIUM: 2 MG/DL (ref 1.6–2.6)
MCH RBC QN AUTO: 28.7 PG (ref 26–35)
MCHC RBC AUTO-ENTMCNC: 31.4 % (ref 32–34.5)
MCV RBC AUTO: 91.5 FL (ref 80–99.9)
METER GLUCOSE: 115 MG/DL (ref 70–110)
METER GLUCOSE: 129 MG/DL (ref 70–110)
METER GLUCOSE: 130 MG/DL (ref 70–110)
METER GLUCOSE: 132 MG/DL (ref 70–110)
METER GLUCOSE: 95 MG/DL (ref 70–110)
METHB: 0.2 % (ref 0–1.5)
METHB: 0.3 % (ref 0–1.5)
METHB: 0.5 % (ref 0–1.5)
MODE: ABNORMAL
MONOCYTES ABSOLUTE: 0.92 E9/L (ref 0.1–0.95)
MONOCYTES RELATIVE PERCENT: 7.1 % (ref 2–12)
MYELOCYTE PERCENT: 3.5 % (ref 0–0)
NEUTROPHILS ABSOLUTE: 10.69 E9/L (ref 1.8–7.3)
NEUTROPHILS RELATIVE PERCENT: 77.9 % (ref 43–80)
O2 CONTENT: 10.2 ML/DL
O2 CONTENT: 13 ML/DL
O2 CONTENT: 13.9 ML/DL
O2 SATURATION: 96.3 % (ref 92–98.5)
O2 SATURATION: 98.4 % (ref 92–98.5)
O2 SATURATION: 99.5 % (ref 92–98.5)
O2HB: 94.2 % (ref 94–97)
O2HB: 97.7 % (ref 94–97)
O2HB: 98.8 % (ref 94–97)
OPERATOR ID: ABNORMAL
ORGANISM: ABNORMAL
PATIENT TEMP: 37 C
PCO2: 41.4 MMHG (ref 35–45)
PCO2: 41.5 MMHG (ref 35–45)
PCO2: 48.7 MMHG (ref 35–45)
PDW BLD-RTO: 16.3 FL (ref 11.5–15)
PEEP/CPAP: 5 CMH?O
PFO2: 2.12 MMHG/%
PH BLOOD GAS: 7.39 (ref 7.35–7.45)
PH BLOOD GAS: 7.39 (ref 7.35–7.45)
PH BLOOD GAS: 7.42 (ref 7.35–7.45)
PHOSPHORUS: 3.4 MG/DL (ref 2.5–4.5)
PLATELET # BLD: 126 E9/L (ref 130–450)
PMV BLD AUTO: 10.9 FL (ref 7–12)
PO2: 111.1 MMHG (ref 60–100)
PO2: 205.8 MMHG (ref 60–100)
PO2: 84.6 MMHG (ref 60–100)
POLYCHROMASIA: ABNORMAL
POTASSIUM REFLEX MAGNESIUM: 3.4 MMOL/L (ref 3.5–5)
PROTHROMBIN TIME: 13.5 SEC (ref 9.3–12.4)
PS: 10 CMH20
RBC # BLD: 2.47 E12/L (ref 3.8–5.8)
RI(T): 155 %
SMEAR, RESPIRATORY: ABNORMAL
SODIUM BLD-SCNC: 143 MMOL/L (ref 132–146)
SOURCE, BLOOD GAS: ABNORMAL
THB: 10 G/DL (ref 11.5–16.5)
THB: 7.6 G/DL (ref 11.5–16.5)
THB: 9 G/DL (ref 11.5–16.5)
TIME ANALYZED: 2141
TIME ANALYZED: 2336
TIME ANALYZED: 515
TOTAL PROTEIN: 4 G/DL (ref 6.4–8.3)
WBC # BLD: 13.2 E9/L (ref 4.5–11.5)

## 2018-03-07 PROCEDURE — 94003 VENT MGMT INPAT SUBQ DAY: CPT

## 2018-03-07 PROCEDURE — 85025 COMPLETE CBC W/AUTO DIFF WBC: CPT

## 2018-03-07 PROCEDURE — 80053 COMPREHEN METABOLIC PANEL: CPT

## 2018-03-07 PROCEDURE — 83735 ASSAY OF MAGNESIUM: CPT

## 2018-03-07 PROCEDURE — 36415 COLL VENOUS BLD VENIPUNCTURE: CPT

## 2018-03-07 PROCEDURE — 36592 COLLECT BLOOD FROM PICC: CPT

## 2018-03-07 PROCEDURE — 9990 CHARGE CONVERSION

## 2018-03-07 PROCEDURE — 82962 GLUCOSE BLOOD TEST: CPT

## 2018-03-07 PROCEDURE — 82330 ASSAY OF CALCIUM: CPT

## 2018-03-07 PROCEDURE — 85610 PROTHROMBIN TIME: CPT

## 2018-03-07 PROCEDURE — 82805 BLOOD GASES W/O2 SATURATION: CPT

## 2018-03-07 PROCEDURE — 71045 X-RAY EXAM CHEST 1 VIEW: CPT

## 2018-03-07 PROCEDURE — C9113 INJ PANTOPRAZOLE SODIUM, VIA: HCPCS

## 2018-03-07 PROCEDURE — 99291 CRITICAL CARE FIRST HOUR: CPT | Performed by: SURGERY

## 2018-03-07 PROCEDURE — 84100 ASSAY OF PHOSPHORUS: CPT

## 2018-03-07 RX ORDER — POTASSIUM CHLORIDE 29.8 MG/ML
INJECTION INTRAVENOUS
Status: COMPLETED
Start: 2018-03-07 | End: 2018-03-07

## 2018-03-07 RX ORDER — LORAZEPAM 2 MG/ML
0.5 INJECTION INTRAMUSCULAR EVERY 6 HOURS PRN
Status: DISCONTINUED | OUTPATIENT
Start: 2018-03-07 | End: 2018-03-08

## 2018-03-07 RX ORDER — LORAZEPAM 2 MG/ML
INJECTION INTRAMUSCULAR
Status: COMPLETED
Start: 2018-03-07 | End: 2018-03-07

## 2018-03-07 RX ORDER — METOPROLOL TARTRATE 50 MG/1
50 TABLET, FILM COATED ORAL 2 TIMES DAILY
Status: DISCONTINUED | OUTPATIENT
Start: 2018-03-07 | End: 2018-03-08

## 2018-03-07 RX ORDER — FUROSEMIDE 10 MG/ML
20 INJECTION INTRAMUSCULAR; INTRAVENOUS ONCE
Status: DISCONTINUED | OUTPATIENT
Start: 2018-03-08 | End: 2018-03-07

## 2018-03-07 RX ORDER — DIMETHICONE, OXYBENZONE, AND PADIMATE O 2; 2.5; 6.6 G/100G; G/100G; G/100G
STICK TOPICAL
Status: COMPLETED
Start: 2018-03-07 | End: 2018-03-07

## 2018-03-07 RX ORDER — IPRATROPIUM BROMIDE AND ALBUTEROL SULFATE 2.5; .5 MG/3ML; MG/3ML
1 SOLUTION RESPIRATORY (INHALATION)
Status: DISCONTINUED | OUTPATIENT
Start: 2018-03-08 | End: 2018-03-14 | Stop reason: HOSPADM

## 2018-03-07 RX ORDER — FUROSEMIDE 10 MG/ML
20 INJECTION INTRAMUSCULAR; INTRAVENOUS ONCE
Status: DISCONTINUED | OUTPATIENT
Start: 2018-03-07 | End: 2018-03-07

## 2018-03-07 RX ORDER — POTASSIUM CHLORIDE 29.8 MG/ML
20 INJECTION INTRAVENOUS
Status: COMPLETED | OUTPATIENT
Start: 2018-03-07 | End: 2018-03-07

## 2018-03-07 RX ADMIN — LORAZEPAM 1 MG: 2 INJECTION INTRAMUSCULAR at 02:23

## 2018-03-07 RX ADMIN — Medication 10 ML: at 21:16

## 2018-03-07 RX ADMIN — LORAZEPAM 1 MG: 2 INJECTION INTRAMUSCULAR at 07:40

## 2018-03-07 RX ADMIN — ACETAMINOPHEN 650 MG: 325 TABLET ORAL at 11:38

## 2018-03-07 RX ADMIN — LABETALOL HYDROCHLORIDE 10 MG: 5 INJECTION, SOLUTION INTRAVENOUS at 15:21

## 2018-03-07 RX ADMIN — Medication 10 ML: at 09:46

## 2018-03-07 RX ADMIN — DIMETHICONE, OXYBENZONE, AND PADIMATE O: 2; 2.5; 6.6 STICK TOPICAL at 13:25

## 2018-03-07 RX ADMIN — PANTOPRAZOLE SODIUM 40 MG: 40 INJECTION, POWDER, FOR SOLUTION INTRAVENOUS at 09:45

## 2018-03-07 RX ADMIN — IPRATROPIUM BROMIDE AND ALBUTEROL SULFATE 1 AMPULE: 2.5; .5 SOLUTION RESPIRATORY (INHALATION) at 23:50

## 2018-03-07 RX ADMIN — POTASSIUM CHLORIDE 20 MEQ: 29.8 INJECTION INTRAVENOUS at 11:28

## 2018-03-07 RX ADMIN — POTASSIUM CHLORIDE 20 MEQ: 29.8 INJECTION INTRAVENOUS at 06:36

## 2018-03-07 RX ADMIN — Medication 10 ML: at 09:45

## 2018-03-07 RX ADMIN — HYDRALAZINE HYDROCHLORIDE 10 MG: 20 INJECTION INTRAMUSCULAR; INTRAVENOUS at 21:18

## 2018-03-07 RX ADMIN — POTASSIUM CHLORIDE 20 MEQ: 29.8 INJECTION INTRAVENOUS at 09:27

## 2018-03-07 RX ADMIN — LABETALOL HYDROCHLORIDE 10 MG: 5 INJECTION, SOLUTION INTRAVENOUS at 22:25

## 2018-03-07 RX ADMIN — CHLORHEXIDINE GLUCONATE 15 ML: 0.12 RINSE ORAL at 09:42

## 2018-03-07 RX ADMIN — Medication 10 ML: at 02:23

## 2018-03-07 RX ADMIN — LORAZEPAM 0.5 MG: 2 INJECTION INTRAMUSCULAR at 22:50

## 2018-03-07 RX ADMIN — MINERAL OIL AND WHITE PETROLATUM: 150; 830 OINTMENT OPHTHALMIC at 06:37

## 2018-03-07 RX ADMIN — METOPROLOL TARTRATE 50 MG: 50 TABLET, FILM COATED ORAL at 21:17

## 2018-03-07 RX ADMIN — LABETALOL HYDROCHLORIDE 10 MG: 5 INJECTION, SOLUTION INTRAVENOUS at 12:34

## 2018-03-07 RX ADMIN — PANTOPRAZOLE SODIUM 40 MG: 40 INJECTION, POWDER, FOR SOLUTION INTRAVENOUS at 21:16

## 2018-03-07 RX ADMIN — LABETALOL HYDROCHLORIDE 10 MG: 5 INJECTION, SOLUTION INTRAVENOUS at 17:24

## 2018-03-07 ASSESSMENT — PULMONARY FUNCTION TESTS
PIF_VALUE: 14
PIF_VALUE: 15
PIF_VALUE: 14
PIF_VALUE: 14
PIF_VALUE: 13
PIF_VALUE: 15
PIF_VALUE: 12
PIF_VALUE: 15
PIF_VALUE: 15
PIF_VALUE: 16
PIF_VALUE: 15

## 2018-03-07 NOTE — PROGRESS NOTES
[facility]  SURGICAL INTENSIVE CARE UNIT (SICU)  ATTENDING PHYSICIAN CRITICAL CARE PROGRESS NOTE     I have examined the patient, reviewed the record, and discussed the case with the APN/  Resident. I have reviewed all relevant labs and imaging data. Please refer to the  APN/ resident's note. I agree with the  assessment and plan with the following corrections/ additions. The following summarizes my clinical findings and independent assessment. CC: Bleeding from [address]  3/2 embolization of GDA   Required massive transfusion protocol  3/6 underwent EGD--no evidence of upper gi bleeding    S. Pt is breathing comfortably and following commands. EXAM:  Intubated   Follows commands on vent  PERRL  S1S2  Abdomen soft nt  nd  Rt groin dressed        ASSESSMENT:  Principal Problem:    Rectal bleeding  Active Problems:    HCAP (healthcare-associated pneumonia)    Melena    Acute blood loss anemia  Resolved Problems:    * No resolved hospital problems.  *       PLAN:  Sedation/ Pain: on fentanyl gtt at 75 mcg/ h   Follows commands    CV:   Hemorrhagic Shock Class IV Resolved  Bleeding appears to be controlled after embolization of the GDA on 3/2  EGD from 3/6 shows no evidence of bleeding    Pulmonary:   Positive for Influenza B  On Vent--continue PS trials  Will extubate this AM    GI: NPO s/p bleeding ulcer requiring GDA embolization and massive transfusion    FEN: Acute kidney injury resolving     Overall fluid balance + 9L  Diuresing well     ID: No respiratory symtoms from the flu  Dc droplet precautions    Heme: Thrombocytopenia resolved   platelets improving  Hb equilibrating from massive upper gi bleed    Endo: Continue Sliding Scale    DVT prophylaxis--SCDS, anticoagulation contraindicated due to bleeding  GI Prophylaxis--PPI  Lines--central line 3/1, arterial line 3/2  CODE: FULL     DISPOSITION-Continue ICU Care    Critical care time exclusive of teaching and procedures

## 2018-03-07 NOTE — PLAN OF CARE
Problem: Fluid Volume:  Goal: Fluid loss will decrease  Fluid loss will decrease   Outcome: Met This Shift      Problem: Risk for Impaired Skin Integrity  Goal: Tissue integrity - skin and mucous membranes  Structural intactness and normal physiological function of skin and  mucous membranes.    Outcome: Met This Shift      Problem: Falls - Risk of  Goal: Absence of falls  Outcome: Met This Shift      Problem: Restraint Use - Nonviolent/Non-Self-Destructive Behavior:  Goal: Absence of restraint indications  Absence of restraint indications   Outcome: Not Met This Shift    Goal: Absence of restraint-related injury  Absence of restraint-related injury   Outcome: Met This Shift

## 2018-03-07 NOTE — CONSULTS
98.2 °F (36.8 °C)      TempSrc: Axillary      SpO2: 100% 98% 100% 100%   Weight:       Height:         Physical Exam  General: Patient is awake and intubated. Vascular: DP and PT pulses 1/4 b/l, biphasic on hand-held doppler. CFT <5 secs b/l. Skin temperature warm to cool from proximal to distal. No appreciable edema noted. Neuro: Protective Sensation unable to be assessed. Derm: Thick hyperkeratotic tissue noted to the plantar right forefoot spanning the 2nd and 3rd metatarsal head. No signs of infection. Toenails 1-5 b/l are abnormal in thickness, color and length. Webspaces 1-4 b/l are clean, dry, and intact. Heels are intact. Musculoskeletal: Muscle Strength testing deferred. Scheduled Meds:   sodium chloride  250 mL Intravenous Once    potassium chloride  40 mEq Oral BID    potassium & sodium phosphates  2 packet Oral 4x Daily    pantoprazole  40 mg Intravenous BID    And    sodium chloride (PF)  10 mL Intravenous BID    insulin lispro  0-18 Units Subcutaneous Q4H    cefTRIAXone (ROCEPHIN) IV  2 g Intravenous Q24H    lubrifresh P.M. Both Eyes Q4H    chlorhexidine  15 mL Mouth/Throat BID    sodium chloride flush  10 mL Intravenous 2 times per day    sodium chloride flush  10 mL Intravenous 2 times per day     Continuous Infusions:   fentaNYL 5 mcg/ml in D5W 250 ml infusion 125 mcg/hr (03/06/18 1113)    dextrose       PRN Meds:.hydrALAZINE, labetalol, LORazepam, glucose, dextrose, glucagon (rDNA), dextrose, acetaminophen, ondansetron          Assessment  1. Onychomycosis   2. Onychogryphosis   3.  Hyperkeratotic tissue plantar right foot     Plan  - Initial patient examination and evaluation.  - Reviewed patient's recent lab results and pertinent diagnostic imaging.  - Debrided nails 1-5 b/l in length and thickness without incident.  - Debrided hyperkeratotic tissue of plantar right forefoot to soft viable skin with a #15 blade without incident.   -No further surgical or invasive

## 2018-03-07 NOTE — PROGRESS NOTES
Surgical Intensive Care Unit   Daily Progress Note     Date of admission: 3/1/2018    Reason for ICU: hemorrhagic shock from upper GI bleed    Pertinent Hospital Course Events:   3/1 Presentation to ED for acute upper GI bleed, admission to MICU  3/2 EGD with small amount of blood ins stomach, duodenum with large amount of blood and large clot, IR GDA embolization, transfer to SICU  3/3 DC nimbex, versed, sandy drips  3/5 PS trials  3/6 EGD- no evidence of bleed    Overnight Events: none acute     Subjective: intubated, sedated. Physical Exam:   BP (!) 154/73   Pulse 96   Temp 99.5 °F (37.5 °C) (Core)   Resp 29   Ht 5' 7\" (1.702 m)   Wt 214 lb 14.4 oz (97.5 kg)   SpO2 97%   BMI 33.66 kg/m²      CONSTITUTIONAL: Laying in bed, intubated, sedated  NEURO: Sedated, intubated  LUNGS: intubated on vent, CTABL  CARDIOVASCULAR: RR  ABDOMEN: soft, NT, ND  MUSCULOSKELETAL: 1+ edema peripherally     ASSESSMENT / PLAN:   Neuro: Intubated, sedated. Fentanyl drip used for pain control, ativan PRN used for sedation. GCS 11T  CV: Hemorrhagic shock resolved. Monitor hemodynamics. Pulm: Acute respiratory failure. Monitor daily ABG/CXR. Continue PS, possible extubation today  GI: NPO, NGT. Hemorrhagic shock due to acute upper GI bleed from duodenal ulcer sp 3/2 EGD with duodenal ulcer and clot, 3/2 IR GDA embolization. Continue to monitor NG tube and FMS output. Repeat EGD 3/6 without evidence of bleeding. Hold tube feeds for now with possibility of extubation today  Renal: No acute issues. UOP adequate. ID: Started on rocephin for infiltrate on CXR, Day 5. Followed by ID  Endocrine: No acute issues. Monitor BG   MSK: No acute issues. PTOT when able   Heme: Hemorrhagic shock related to acute upper GI bleed sp transfusion. Continue to monitor.      Bowel regimen: none at present  Pain control/Sedation: fentanyl   DVT prophylaxis: SCDs  GI: Protonix drip  Glucose protocol: SSI as needed  Mouth/Eye care: Artificial

## 2018-03-07 NOTE — PROGRESS NOTES
Continues to improve  Extubated   Alert and oriented  Low grade temp , vs stable  RRR with distant tones  Diminished breath sounds bilaterally  Discussed with wife at the bedside and all questions answered

## 2018-03-07 NOTE — FLOWSHEET NOTE
Patient continues to pull at tubes and lines. Education provided to patient no evidence of learning at this time. Will continue to assess need for restraints.

## 2018-03-07 NOTE — PLAN OF CARE
Problem: Restraint Use - Nonviolent/Non-Self-Destructive Behavior:  Goal: Absence of restraint indications  Absence of restraint indications   Outcome: Completed Date Met: 03/07/18    Goal: Absence of restraint-related injury  Absence of restraint-related injury   Outcome: Completed Date Met: 03/07/18

## 2018-03-08 LAB
ALBUMIN SERPL-MCNC: 2.1 G/DL (ref 3.5–5.2)
ALP BLD-CCNC: 48 U/L (ref 40–129)
ALT SERPL-CCNC: 14 U/L (ref 0–40)
ANION GAP SERPL CALCULATED.3IONS-SCNC: 9 MMOL/L (ref 7–16)
ANISOCYTOSIS: ABNORMAL
AST SERPL-CCNC: 24 U/L (ref 0–39)
B.E.: -4.8 MMOL/L (ref -3–3)
BASOPHILS ABSOLUTE: 0 E9/L (ref 0–0.2)
BASOPHILS RELATIVE PERCENT: 0.1 % (ref 0–2)
BILIRUB SERPL-MCNC: 0.4 MG/DL (ref 0–1.2)
BUN BLDV-MCNC: 9 MG/DL (ref 8–23)
CALCIUM SERPL-MCNC: 7.4 MG/DL (ref 8.6–10.2)
CHLORIDE BLD-SCNC: 108 MMOL/L (ref 98–107)
CO2: 23 MMOL/L (ref 22–29)
COHB: 0.3 % (ref 0–1.5)
CREAT SERPL-MCNC: 0.9 MG/DL (ref 0.7–1.2)
CRITICAL: ABNORMAL
DATE ANALYZED: ABNORMAL
DATE OF COLLECTION: ABNORMAL
EOSINOPHILS ABSOLUTE: 0.52 E9/L (ref 0.05–0.5)
EOSINOPHILS RELATIVE PERCENT: 3.6 % (ref 0–6)
GFR AFRICAN AMERICAN: >60
GFR NON-AFRICAN AMERICAN: >60 ML/MIN/1.73
GLUCOSE BLD-MCNC: 121 MG/DL (ref 74–109)
HCO3: 19.1 MMOL/L (ref 22–26)
HCT VFR BLD CALC: 25 % (ref 37–54)
HEMOGLOBIN: 7.8 G/DL (ref 12.5–16.5)
HHB: 5.5 % (ref 0–5)
HYPOCHROMIA: ABNORMAL
LAB: ABNORMAL
LYMPHOCYTES ABSOLUTE: 1.02 E9/L (ref 1.5–4)
LYMPHOCYTES RELATIVE PERCENT: 7.1 % (ref 20–42)
Lab: 1905
MAGNESIUM: 2 MG/DL (ref 1.6–2.6)
MCH RBC QN AUTO: 28.8 PG (ref 26–35)
MCHC RBC AUTO-ENTMCNC: 31.2 % (ref 32–34.5)
MCV RBC AUTO: 92.3 FL (ref 80–99.9)
METHB: 0 % (ref 0–1.5)
MODE: ABNORMAL
MONOCYTES ABSOLUTE: 0.44 E9/L (ref 0.1–0.95)
MONOCYTES RELATIVE PERCENT: 2.7 % (ref 2–12)
MYELOCYTE PERCENT: 3.6 % (ref 0–0)
NEUTROPHILS ABSOLUTE: 12.62 E9/L (ref 1.8–7.3)
NEUTROPHILS RELATIVE PERCENT: 83 % (ref 43–80)
O2 CONTENT: 13.6 ML/DL
O2 SATURATION: 94.5 % (ref 92–98.5)
O2HB: 94.2 % (ref 94–97)
OPERATOR ID: 1868
PATIENT TEMP: 37 C
PCO2: 30.9 MMHG (ref 35–45)
PDW BLD-RTO: 16 FL (ref 11.5–15)
PH BLOOD GAS: 7.41 (ref 7.35–7.45)
PHOSPHORUS: 2.6 MG/DL (ref 2.5–4.5)
PLATELET # BLD: 165 E9/L (ref 130–450)
PMV BLD AUTO: 10.9 FL (ref 7–12)
PO2: 75.2 MMHG (ref 60–100)
POIKILOCYTES: ABNORMAL
POLYCHROMASIA: ABNORMAL
POTASSIUM REFLEX MAGNESIUM: 3.6 MMOL/L (ref 3.5–5)
RBC # BLD: 2.71 E12/L (ref 3.8–5.8)
SODIUM BLD-SCNC: 140 MMOL/L (ref 132–146)
SOURCE, BLOOD GAS: ABNORMAL
THB: 10.2 G/DL (ref 11.5–16.5)
TIME ANALYZED: 1909
TOTAL PROTEIN: 4.7 G/DL (ref 6.4–8.3)
WBC # BLD: 14.5 E9/L (ref 4.5–11.5)

## 2018-03-08 PROCEDURE — 94640 AIRWAY INHALATION TREATMENT: CPT

## 2018-03-08 PROCEDURE — 70450 CT HEAD/BRAIN W/O DYE: CPT

## 2018-03-08 PROCEDURE — 99291 CRITICAL CARE FIRST HOUR: CPT | Performed by: SURGERY

## 2018-03-08 PROCEDURE — 83735 ASSAY OF MAGNESIUM: CPT

## 2018-03-08 PROCEDURE — 94668 MNPJ CHEST WALL SBSQ: CPT

## 2018-03-08 PROCEDURE — 85025 COMPLETE CBC W/AUTO DIFF WBC: CPT

## 2018-03-08 PROCEDURE — 36415 COLL VENOUS BLD VENIPUNCTURE: CPT

## 2018-03-08 PROCEDURE — 94667 MNPJ CHEST WALL 1ST: CPT

## 2018-03-08 PROCEDURE — 80053 COMPREHEN METABOLIC PANEL: CPT

## 2018-03-08 PROCEDURE — 71045 X-RAY EXAM CHEST 1 VIEW: CPT

## 2018-03-08 PROCEDURE — C9113 INJ PANTOPRAZOLE SODIUM, VIA: HCPCS

## 2018-03-08 PROCEDURE — 84100 ASSAY OF PHOSPHORUS: CPT

## 2018-03-08 PROCEDURE — 82805 BLOOD GASES W/O2 SATURATION: CPT

## 2018-03-08 PROCEDURE — 9990 CHARGE CONVERSION

## 2018-03-08 RX ORDER — HEPARIN SODIUM 10000 [USP'U]/ML
5000 INJECTION, SOLUTION INTRAVENOUS; SUBCUTANEOUS EVERY 8 HOURS SCHEDULED
Status: DISCONTINUED | OUTPATIENT
Start: 2018-03-08 | End: 2018-03-14 | Stop reason: HOSPADM

## 2018-03-08 RX ORDER — POTASSIUM CHLORIDE 20MEQ/15ML
40 LIQUID (ML) ORAL ONCE
Status: COMPLETED | OUTPATIENT
Start: 2018-03-08 | End: 2018-03-08

## 2018-03-08 RX ORDER — FUROSEMIDE 10 MG/ML
20 INJECTION INTRAMUSCULAR; INTRAVENOUS ONCE
Status: COMPLETED | OUTPATIENT
Start: 2018-03-08 | End: 2018-03-08

## 2018-03-08 RX ORDER — METOPROLOL TARTRATE 5 MG/5ML
5 INJECTION INTRAVENOUS EVERY 6 HOURS
Status: DISCONTINUED | OUTPATIENT
Start: 2018-03-08 | End: 2018-03-09

## 2018-03-08 RX ADMIN — PANTOPRAZOLE SODIUM 40 MG: 40 INJECTION, POWDER, FOR SOLUTION INTRAVENOUS at 20:14

## 2018-03-08 RX ADMIN — Medication 10 ML: at 09:00

## 2018-03-08 RX ADMIN — Medication 40 MEQ: at 06:59

## 2018-03-08 RX ADMIN — HEPARIN SODIUM 5000 UNITS: 10000 INJECTION, SOLUTION INTRAVENOUS; SUBCUTANEOUS at 08:01

## 2018-03-08 RX ADMIN — Medication 10 ML: at 20:14

## 2018-03-08 RX ADMIN — METOPROLOL TARTRATE 5 MG: 5 INJECTION INTRAVENOUS at 20:14

## 2018-03-08 RX ADMIN — IPRATROPIUM BROMIDE AND ALBUTEROL SULFATE 1 AMPULE: 2.5; .5 SOLUTION RESPIRATORY (INHALATION) at 17:24

## 2018-03-08 RX ADMIN — IPRATROPIUM BROMIDE AND ALBUTEROL SULFATE 1 AMPULE: 2.5; .5 SOLUTION RESPIRATORY (INHALATION) at 13:09

## 2018-03-08 RX ADMIN — IPRATROPIUM BROMIDE AND ALBUTEROL SULFATE 1 AMPULE: 2.5; .5 SOLUTION RESPIRATORY (INHALATION) at 21:17

## 2018-03-08 RX ADMIN — METOPROLOL TARTRATE 50 MG: 50 TABLET, FILM COATED ORAL at 09:34

## 2018-03-08 RX ADMIN — Medication 40 MEQ: at 17:12

## 2018-03-08 RX ADMIN — HEPARIN SODIUM 5000 UNITS: 10000 INJECTION, SOLUTION INTRAVENOUS; SUBCUTANEOUS at 17:12

## 2018-03-08 RX ADMIN — FUROSEMIDE 20 MG: 10 INJECTION INTRAMUSCULAR; INTRAVENOUS at 08:01

## 2018-03-08 RX ADMIN — IPRATROPIUM BROMIDE AND ALBUTEROL SULFATE 1 AMPULE: 2.5; .5 SOLUTION RESPIRATORY (INHALATION) at 09:43

## 2018-03-08 RX ADMIN — HEPARIN SODIUM 5000 UNITS: 10000 INJECTION, SOLUTION INTRAVENOUS; SUBCUTANEOUS at 22:22

## 2018-03-08 RX ADMIN — PANTOPRAZOLE SODIUM 40 MG: 40 INJECTION, POWDER, FOR SOLUTION INTRAVENOUS at 09:34

## 2018-03-08 ASSESSMENT — PAIN SCALES - PAIN ASSESSMENT IN ADVANCED DEMENTIA (PAINAD)
TOTALSCORE: 0
BREATHING: 0
BODYLANGUAGE: 0
NEGVOCALIZATION: 0
CONSOLABILITY: 0
BREATHING: 1
NEGVOCALIZATION: 0
TOTALSCORE: 3
FACIALEXPRESSION: 0
CONSOLABILITY: 1
BODYLANGUAGE: 1
FACIALEXPRESSION: 0

## 2018-03-08 ASSESSMENT — PAIN SCALES - GENERAL
PAINLEVEL_OUTOF10: 0
PAINLEVEL_OUTOF10: 3
PAINLEVEL_OUTOF10: 0

## 2018-03-08 NOTE — PROGRESS NOTES
[facility]  SURGICAL INTENSIVE CARE UNIT (SICU)  ATTENDING PHYSICIAN CRITICAL CARE PROGRESS NOTE     I have examined the patient, reviewed the record, and discussed the case with the APN/  Resident. I have reviewed all relevant labs and imaging data. Please refer to the  APN/ resident's note. I agree with the  assessment and plan with the following corrections/ additions. The following summarizes my clinical findings and independent assessment. CC: Bleeding from [address]  3/2 embolization of GDA   Required massive transfusion protocol  3/6 underwent EGD--no evidence of upper gi bleeding  3/7 extubated    S. Pt was extubated yesterday. Overnight, some confusion and agitation. Underwent ct head that was negative for bleed    EXAM:  GCS 14  Weak cough  Ng in place  Follows commands intermittently  PERRL  S1S2  Abdomen soft nt  nd  Rt groin dressed        ASSESSMENT:  Principal Problem:    Rectal bleeding  Active Problems:    HCAP (healthcare-associated pneumonia)    Melena    Acute blood loss anemia  Resolved Problems:    * No resolved hospital problems.  *       PLAN:  Off pain meds  Mild confusion  Ct head negative  Presumed delirium from ICU    CV:   Hemorrhagic Shock Class IV Resolved  Bleeding appears to be controlled after embolization of the GDA on 3/2  EGD from 3/6 shows no evidence of bleeding    Pulmonary:   Extubated 3/7  Moderate respiratory insuffficiency  Needs aggressive pulmonary hygeine  At risk for reintubation    GI: NPO s/p bleeding ulcer requiring GDA embolization and massive transfusion  Moderate calorie protein malnutttion--cont tube feeds    FEN: Acute kidney injury resolved   -2.3L over past 24 hours  Diuresing well  Will give lasix 20 mg iv x 1  Replete k     ID:off abx/ afebrile    Heme: Thrombocytopenia resolved   platelets improving  Hb equilibrating from massive upper gi bleed    Endo: Continue Sliding Scale    DVT prophylaxis--SCDS, heparin tid GI Prophylaxis--PPI  Lines--central line 3/1  CODE: FULL     DISPOSITION-Continue ICU Care    Critical care time exclusive of teaching and procedures = 33min     Pt needs continuous ICU monitoring because the patient is at risk for deterioration from a  Respiratory failure standpoint    Rayo Panda MD, FACS  3/8/2018  7:01 AM

## 2018-03-08 NOTE — FLOWSHEET NOTE
Patient trial release continues to remove non-rebreather mask and pulling at TLC. Unable to reorient / redirect at this time. soft wrist restraints reaplied at this time.

## 2018-03-09 LAB
ALBUMIN SERPL-MCNC: 2.4 G/DL (ref 3.5–5.2)
ALP BLD-CCNC: 43 U/L (ref 40–129)
ALT SERPL-CCNC: 17 U/L (ref 0–40)
ANION GAP SERPL CALCULATED.3IONS-SCNC: 12 MMOL/L (ref 7–16)
ANISOCYTOSIS: ABNORMAL
AST SERPL-CCNC: 26 U/L (ref 0–39)
BASOPHILS ABSOLUTE: 0.01 E9/L (ref 0–0.2)
BASOPHILS RELATIVE PERCENT: 0.1 % (ref 0–2)
BILIRUB SERPL-MCNC: 0.7 MG/DL (ref 0–1.2)
BUN BLDV-MCNC: 9 MG/DL (ref 8–23)
CALCIUM SERPL-MCNC: 7.7 MG/DL (ref 8.6–10.2)
CHLORIDE BLD-SCNC: 107 MMOL/L (ref 98–107)
CO2: 22 MMOL/L (ref 22–29)
CREAT SERPL-MCNC: 0.9 MG/DL (ref 0.7–1.2)
EOSINOPHILS ABSOLUTE: 0.22 E9/L (ref 0.05–0.5)
EOSINOPHILS RELATIVE PERCENT: 1.7 % (ref 0–6)
GFR AFRICAN AMERICAN: >60
GFR NON-AFRICAN AMERICAN: >60 ML/MIN/1.73
GLUCOSE BLD-MCNC: 68 MG/DL (ref 74–109)
HCT VFR BLD CALC: 25.8 % (ref 37–54)
HEMOGLOBIN: 8.2 G/DL (ref 12.5–16.5)
HYPOCHROMIA: ABNORMAL
IMMATURE GRANULOCYTES #: 0.58 E9/L
IMMATURE GRANULOCYTES %: 4.6 % (ref 0–5)
LYMPHOCYTES ABSOLUTE: 1.58 E9/L (ref 1.5–4)
LYMPHOCYTES RELATIVE PERCENT: 12.4 % (ref 20–42)
MAGNESIUM: 2.2 MG/DL (ref 1.6–2.6)
MCH RBC QN AUTO: 28.9 PG (ref 26–35)
MCHC RBC AUTO-ENTMCNC: 31.8 % (ref 32–34.5)
MCV RBC AUTO: 90.8 FL (ref 80–99.9)
MONOCYTES ABSOLUTE: 0.8 E9/L (ref 0.1–0.95)
MONOCYTES RELATIVE PERCENT: 6.3 % (ref 2–12)
NEUTROPHILS ABSOLUTE: 9.55 E9/L (ref 1.8–7.3)
NEUTROPHILS RELATIVE PERCENT: 74.9 % (ref 43–80)
OVALOCYTES: ABNORMAL
PDW BLD-RTO: 15.8 FL (ref 11.5–15)
PHOSPHORUS: 2.2 MG/DL (ref 2.5–4.5)
PLATELET # BLD: 207 E9/L (ref 130–450)
PMV BLD AUTO: 10.9 FL (ref 7–12)
POIKILOCYTES: ABNORMAL
POLYCHROMASIA: ABNORMAL
POTASSIUM REFLEX MAGNESIUM: 3.9 MMOL/L (ref 3.5–5)
RBC # BLD: 2.84 E12/L (ref 3.8–5.8)
SODIUM BLD-SCNC: 141 MMOL/L (ref 132–146)
TOTAL PROTEIN: 5 G/DL (ref 6.4–8.3)
WBC # BLD: 12.7 E9/L (ref 4.5–11.5)

## 2018-03-09 PROCEDURE — 74230 X-RAY XM SWLNG FUNCJ C+: CPT

## 2018-03-09 PROCEDURE — 94668 MNPJ CHEST WALL SBSQ: CPT

## 2018-03-09 PROCEDURE — 99233 SBSQ HOSP IP/OBS HIGH 50: CPT | Performed by: SURGERY

## 2018-03-09 PROCEDURE — 9990 CHARGE CONVERSION

## 2018-03-09 PROCEDURE — 80053 COMPREHEN METABOLIC PANEL: CPT

## 2018-03-09 PROCEDURE — 36415 COLL VENOUS BLD VENIPUNCTURE: CPT

## 2018-03-09 PROCEDURE — 92611 MOTION FLUOROSCOPY/SWALLOW: CPT

## 2018-03-09 PROCEDURE — 83735 ASSAY OF MAGNESIUM: CPT

## 2018-03-09 PROCEDURE — 84100 ASSAY OF PHOSPHORUS: CPT

## 2018-03-09 PROCEDURE — 85025 COMPLETE CBC W/AUTO DIFF WBC: CPT

## 2018-03-09 PROCEDURE — 94640 AIRWAY INHALATION TREATMENT: CPT

## 2018-03-09 PROCEDURE — 74018 RADEX ABDOMEN 1 VIEW: CPT

## 2018-03-09 PROCEDURE — C9113 INJ PANTOPRAZOLE SODIUM, VIA: HCPCS

## 2018-03-09 RX ORDER — LISINOPRIL 5 MG/1
5 TABLET ORAL DAILY
Status: DISCONTINUED | OUTPATIENT
Start: 2018-03-09 | End: 2018-03-14 | Stop reason: HOSPADM

## 2018-03-09 RX ORDER — FUROSEMIDE 10 MG/ML
20 INJECTION INTRAMUSCULAR; INTRAVENOUS ONCE
Status: COMPLETED | OUTPATIENT
Start: 2018-03-09 | End: 2018-03-09

## 2018-03-09 RX ORDER — SIMVASTATIN 40 MG
80 TABLET ORAL NIGHTLY
Status: DISCONTINUED | OUTPATIENT
Start: 2018-03-09 | End: 2018-03-14 | Stop reason: HOSPADM

## 2018-03-09 RX ORDER — ISOSORBIDE MONONITRATE 60 MG/1
60 TABLET, EXTENDED RELEASE ORAL DAILY
Status: DISCONTINUED | OUTPATIENT
Start: 2018-03-09 | End: 2018-03-09

## 2018-03-09 RX ORDER — METOPROLOL TARTRATE 50 MG/1
50 TABLET, FILM COATED ORAL 2 TIMES DAILY
Status: DISCONTINUED | OUTPATIENT
Start: 2018-03-09 | End: 2018-03-14 | Stop reason: HOSPADM

## 2018-03-09 RX ORDER — DIAPER,BRIEF,INFANT-TODD,DISP
EACH MISCELLANEOUS
Status: DISPENSED
Start: 2018-03-09 | End: 2018-03-10

## 2018-03-09 RX ORDER — ISOSORBIDE DINITRATE 10 MG/1
20 TABLET ORAL 3 TIMES DAILY
Status: DISCONTINUED | OUTPATIENT
Start: 2018-03-09 | End: 2018-03-14 | Stop reason: HOSPADM

## 2018-03-09 RX ORDER — ALBUTEROL SULFATE 2.5 MG/3ML
2.5 SOLUTION RESPIRATORY (INHALATION) EVERY 4 HOURS PRN
Status: DISCONTINUED | OUTPATIENT
Start: 2018-03-09 | End: 2018-03-14 | Stop reason: HOSPADM

## 2018-03-09 RX ADMIN — HEPARIN SODIUM 5000 UNITS: 10000 INJECTION, SOLUTION INTRAVENOUS; SUBCUTANEOUS at 21:34

## 2018-03-09 RX ADMIN — ISOSORBIDE DINITRATE 20 MG: 10 TABLET ORAL at 14:05

## 2018-03-09 RX ADMIN — IPRATROPIUM BROMIDE AND ALBUTEROL SULFATE 1 AMPULE: 2.5; .5 SOLUTION RESPIRATORY (INHALATION) at 11:45

## 2018-03-09 RX ADMIN — SIMVASTATIN 80 MG: 40 TABLET, FILM COATED ORAL at 21:46

## 2018-03-09 RX ADMIN — METOPROLOL TARTRATE 5 MG: 5 INJECTION INTRAVENOUS at 09:38

## 2018-03-09 RX ADMIN — METOPROLOL TARTRATE 50 MG: 50 TABLET, FILM COATED ORAL at 14:06

## 2018-03-09 RX ADMIN — PANTOPRAZOLE SODIUM 40 MG: 40 INJECTION, POWDER, FOR SOLUTION INTRAVENOUS at 21:20

## 2018-03-09 RX ADMIN — METOPROLOL TARTRATE 5 MG: 5 INJECTION INTRAVENOUS at 02:29

## 2018-03-09 RX ADMIN — Medication 10 ML: at 09:38

## 2018-03-09 RX ADMIN — IPRATROPIUM BROMIDE AND ALBUTEROL SULFATE 1 AMPULE: 2.5; .5 SOLUTION RESPIRATORY (INHALATION) at 17:20

## 2018-03-09 RX ADMIN — HEPARIN SODIUM 5000 UNITS: 10000 INJECTION, SOLUTION INTRAVENOUS; SUBCUTANEOUS at 06:02

## 2018-03-09 RX ADMIN — METOPROLOL TARTRATE 50 MG: 50 TABLET, FILM COATED ORAL at 21:20

## 2018-03-09 RX ADMIN — IPRATROPIUM BROMIDE AND ALBUTEROL SULFATE 1 AMPULE: 2.5; .5 SOLUTION RESPIRATORY (INHALATION) at 21:13

## 2018-03-09 RX ADMIN — Medication 10 ML: at 21:24

## 2018-03-09 RX ADMIN — PANTOPRAZOLE SODIUM 40 MG: 40 INJECTION, POWDER, FOR SOLUTION INTRAVENOUS at 09:38

## 2018-03-09 RX ADMIN — Medication 10 ML: at 21:20

## 2018-03-09 RX ADMIN — FUROSEMIDE 20 MG: 10 INJECTION INTRAMUSCULAR; INTRAVENOUS at 14:07

## 2018-03-09 RX ADMIN — IPRATROPIUM BROMIDE AND ALBUTEROL SULFATE 1 AMPULE: 2.5; .5 SOLUTION RESPIRATORY (INHALATION) at 08:00

## 2018-03-09 RX ADMIN — LISINOPRIL 5 MG: 5 TABLET ORAL at 14:06

## 2018-03-09 RX ADMIN — ISOSORBIDE DINITRATE 20 MG: 10 TABLET ORAL at 21:20

## 2018-03-09 RX ADMIN — HEPARIN SODIUM 5000 UNITS: 10000 INJECTION, SOLUTION INTRAVENOUS; SUBCUTANEOUS at 14:08

## 2018-03-09 RX ADMIN — Medication 10 ML: at 21:45

## 2018-03-09 ASSESSMENT — PAIN SCALES - PAIN ASSESSMENT IN ADVANCED DEMENTIA (PAINAD)
TOTALSCORE: 0
CONSOLABILITY: 0
NEGVOCALIZATION: 0
CONSOLABILITY: 1
BODYLANGUAGE: 0
TOTALSCORE: 0
BREATHING: 1
NEGVOCALIZATION: 0
TOTALSCORE: 0
FACIALEXPRESSION: 0
BODYLANGUAGE: 0
TOTALSCORE: 1
BODYLANGUAGE: 0
FACIALEXPRESSION: 0
CONSOLABILITY: 0
BREATHING: 0
BREATHING: 0
FACIALEXPRESSION: 0
NEGVOCALIZATION: 0
TOTALSCORE: 2
NEGVOCALIZATION: 0
BREATHING: 0
BODYLANGUAGE: 0
TOTALSCORE: 0
BODYLANGUAGE: 0
FACIALEXPRESSION: 0
CONSOLABILITY: 0
FACIALEXPRESSION: 0
NEGVOCALIZATION: 0
CONSOLABILITY: 0
NEGVOCALIZATION: 0
BREATHING: 0
BREATHING: 1
FACIALEXPRESSION: 0
CONSOLABILITY: 0
BODYLANGUAGE: 0

## 2018-03-09 ASSESSMENT — PAIN SCALES - GENERAL
PAINLEVEL_OUTOF10: 0
PAINLEVEL_OUTOF10: 0
PAINLEVEL_OUTOF10: 1
PAINLEVEL_OUTOF10: 2
PAINLEVEL_OUTOF10: 0
PAINLEVEL_OUTOF10: 0

## 2018-03-09 NOTE — PROGRESS NOTES
Prophylaxis--PPI  Lines--central line 3/1  CODE: FULL     updated wife at bedside     Rashaun Brown MD, FACS  3/9/2018  10:31 AM

## 2018-03-09 NOTE — PLAN OF CARE
Problem: Fluid Volume:  Goal: Ability to achieve a balanced intake and output will improve  Ability to achieve a balanced intake and output will improve   Outcome: Not Met This Shift  Plan of care discussed with patient/family. Patient/family incorporated into plan of care. Problem: Risk for Impaired Skin Integrity  Goal: Tissue integrity - skin and mucous membranes  Structural intactness and normal physiological function of skin and  mucous membranes. Outcome: Met This Shift  Plan of care discussed with patient/family. Patient/family incorporated into plan of care.

## 2018-03-09 NOTE — PROGRESS NOTES
SPEECH/LANGUAGE PATHOLOGY  VIDEOFLUOROSCOPIC STUDY OF SWALLOWING (MBS)      PATIENT NAME:  Gillian Gao      :  1940      TODAY'S DATE:  3/9/2018    SUMMARY OF EVALUATION     DYSPHAGIA DIAGNOSIS:  Marked pharyngeal dysphagia     DIET RECOMMENDATIONS: NPO (nothing by mouth including oral meds)      COMPENSATORY STRATEGIES:      []Double swallow with []all consistencies []thin []nectar []honey []pureed []ground []chopped []soft solid []solid       []Multiple swallow (  Times) with []all consistencies []thin []nectar []honey []pureed []ground []chopped []soft solid []solid     []Chin tuck with []all consistencies  []thin []nectar []honey []pureed []ground []chopped []soft solid []solid      []Throat clear after with []all consistencies []thin []nectar []honey []pureed []ground []chopped []soft solid []solid      []Effortful swallow with []all consistencies  []thin []nectar []honey []pureed []ground []chopped []soft solid []solid     []Small bites/sips        []Alternate solids / liquids      []Check for oral pocketing     []No straw            []Spoon sip liquids        []       ASSISTANCE LEVEL:  []No assistance required   []Stand by assist   []Full assistance required  []Set up required   []Supervision with all PO intake    [] Malnutrition indicators have been identified and nursing has been notified to ensure a dietary consult is ordered.      THERAPY RECOMMENDATIONS:       []  Therapy is not recommended       [x]  Therapy is recommended to:     []  Improve oral motor strength and range of motion     [x]  Improve tongue base retraction      [x]  Improve laryngeal strength and range of motion     []  Address cricopharyngeal dysfunction (Shaker Exercises)    []  Mealtime assessment of patient's tolerance of prescribed diet     []  Repeat Video Swallowing Evaluation is recommended and requires a Physician order    []Therapy at the discretion of facility/treating Speech Pathologist                  PROCEDURE Consistencies Administered During the Evaluation   Liquids: []Thin    []Nectar   [x]Honey   Solids:  [x]Pureed/Pudding  []Soft Solid   []Cookie   Other:       Method of Intake:   [x]Cup   [x]Spoon  []Straw  []Self Fed  [x]Fed by Clinician     Position:   [x]Upright seated  []Upright Standing  []Supine, elevated 45°   []Anterior/Posterior  [x]Lateral   []Oblique                   RESULTS     ORAL STAGE [x]Functional  []Abnormal      [] Dentition: ([]natural  []missing teeth []edentulous []partials []other )     [] Inadequate labial seal resulting anterior labial spillage from ([]right[] left []midline )     [] Decreased mastication due to ([]poor/missing dentition []decreased lingual control []cognitive status)      [] Delayed A-P transit due to ([]decreased initiation[] reduced lingual strength[]cognitive function )     [] Decreased bolus formation resulting in premature pharyngeal spillage      [] Oral residuals []anterior sulcus  []sub lingually  []right buccal cavity []left buccal cavity  []on tongue base  []throughout oral cavity []on superior tongue  []on palate  []on velum          []Comments:        PHARYNGEAL STAGE     [] Functional  [x]Abnormal       ONSET TIME    [x] Onset time of the pharyngeal swallow was adequate      [] Delayed initiation of the pharyngeal swallow ([]mild []moderate []marked []severe []absent ).        Swallow reflex was triggered at: ([]tongue base []valleculae []pyriform sinus)      PHARYNGEAL RESIDUALS          Vallecula/Pharyngeal Wall    []No significant residuals were noted in the vallecula      [x]Reduced tongue base retraction resulting in: ([x]residuals in vallecula []residual on posterior pharyngeal wall)    These residuals were noted for ([]thin []nectar[x] honey [x]pureed []solid)      which( []cleared  []did not clear  [x]partially cleared []mostly cleared)       with ([x]cued []spontaneous [x]double swallow []multiple swallow (  times)  []liquid chaser)      []Residuals in the vallecula were noted due to inadequate epiglottic inversion        Pyriform Sinuses    []No significant residuals were noted in the pyriform sinuses      [x] Residuals in the pyriform sinuses were noted due to ([x]pharyngeal weakness []cricopharyngeal dysfunction.)       These residuals were noted for ([]thin []nectar [x]honey [x]pureed []solid)       which ([]cleared []did not clear  [x]partially cleared  []mostly cleared)        with ([x]cued []spontaneous  [x]double swallow []multiple swallow (  times) []liquid chaser)    LARYNGEAL PENETRATION   []Laryngeal penetration was not present during this evaluation      [x]Laryngeal penetration occurred prior to aspiration. Further details under aspiration section. [x]Laryngeal penetration occurred in the absence of aspiration:       []BEFORE the swallow for ([]thin []nectar []honey[] pureed []solid)      due to: [] decreased bolus formation      []premature pharyngeal entry       []delayed pharyngeal swallow           which  []cleared from the laryngeal vestibule spontaneously  (transient)     []cleared from the laryngeal vestibule with a cued, re-directive throat clear       []remained in the laryngeal vestibule. []penetrated deep into the laryngeal vestibule (to the level of the true vocal folds)      Laryngeal penetration was  ([]trace []mild []moderate []marked []severe ) and      occurred ([]inconsistently  []consistently []only with use of a straw). []DURING  the swallow for ([]thin []nectar []honey[] pureed []solid)       due to: []delayed laryngeal closure      []inadequate laryngeal closure        which  []cleared from the laryngeal vestibule spontaneously  (transient)     []cleared from the laryngeal vestibule with a cued, re-directive throat clear       []remained in the laryngeal vestibule.       []penetrated deep into the laryngeal vestibule (to the level of the true vocal folds)          Laryngeal

## 2018-03-09 NOTE — PLAN OF CARE
Problem: Restraint Use - Nonviolent/Non-Self-Destructive Behavior:  Goal: Absence of restraint indications  Absence of restraint indications    Outcome: Not Met This Shift  Pt removed NG tube and restless  Goal: Absence of restraint-related injury  Absence of restraint-related injury    Outcome: Met This Shift

## 2018-03-09 NOTE — PROGRESS NOTES
ID Progress Note    Cc- Syncope    Subjective:  awake, no fever  Hemodynamically stable    Objective:    Vitals:    03/09/18 1200   BP: (!) 144/72   Pulse: 70   Resp: 25   Temp: 99 °F (37.2 °C)   SpO2: 97%       General Appearance:  AWAKE, ALERT   HEENT:    Normocephalic,PERRL,neck supple, no JVD, mucosa moist, no thrush   Lungs:     Clear to auscultation bilaterally, no wheeze , crackles   Heart:    Regular rate and rhythm, no murmur   Abdomen:     Soft, non-tender, not distended  bowel sounds present,   Extremities:   No edema,no open wound,no erythema, non  tender   Skin:   no rashes or lesions     Labs:  Recent Labs      03/07/18   0430  03/08/18   0520  03/09/18   0510   WBC  13.2*  14.5*  12.7*   RBC  2.47*  2.71*  2.84*   HGB  7.1*  7.8*  8.2*   HCT  22.6*  25.0*  25.8*   MCV  91.5  92.3  90.8   MCH  28.7  28.8  28.9   MCHC  31.4*  31.2*  31.8*   RDW  16.3*  16.0*  15.8*   PLT  126*  165  207   MPV  10.9  10.9  10.9     CMP:    Lab Results   Component Value Date     03/09/2018    K 3.9 03/09/2018     03/09/2018    CO2 22 03/09/2018    BUN 9 03/09/2018    CREATININE 0.9 03/09/2018    GFRAA >60 03/09/2018    LABGLOM >60 03/09/2018    GLUCOSE 68 03/09/2018    PROT 5.0 03/09/2018    LABALBU 2.4 03/09/2018    CALCIUM 7.7 03/09/2018    BILITOT 0.7 03/09/2018    ALKPHOS 43 03/09/2018    AST 26 03/09/2018    ALT 17 03/09/2018      Radiology :    Assessment:  · Influenza B uncomplicated  · Hypovolemic shock from acute GI bleed s/p gastroduodenal art embolisation  · Suspected pneumonia- no old CXR to compare     Plan:    · intravenous peramivir had 1 dose  · finished rocephin  · ID will sign off      Electronically signed by Roger Patel MD on 3/9/2018 at 1:19 PM

## 2018-03-09 NOTE — PLAN OF CARE
Problem: Restraint Use - Nonviolent/Non-Self-Destructive Behavior:  Goal: Absence of restraint indications  Absence of restraint indications    Outcome: Not Met This Shift  Plan of care discussed with patient/family. Patient/family incorporated into plan of care. Goal: Absence of restraint-related injury  Absence of restraint-related injury    Outcome: Met This Shift  Plan of care discussed with patient/family. Patient/family incorporated into plan of care.

## 2018-03-10 LAB
ANION GAP SERPL CALCULATED.3IONS-SCNC: 13 MMOL/L (ref 7–16)
BASOPHILS ABSOLUTE: 0.01 E9/L (ref 0–0.2)
BASOPHILS RELATIVE PERCENT: 0.1 % (ref 0–2)
BUN BLDV-MCNC: 11 MG/DL (ref 8–23)
CALCIUM SERPL-MCNC: 7.7 MG/DL (ref 8.6–10.2)
CHLORIDE BLD-SCNC: 105 MMOL/L (ref 98–107)
CO2: 23 MMOL/L (ref 22–29)
CREAT SERPL-MCNC: 1 MG/DL (ref 0.7–1.2)
EOSINOPHILS ABSOLUTE: 0.16 E9/L (ref 0.05–0.5)
EOSINOPHILS RELATIVE PERCENT: 1.5 % (ref 0–6)
GFR AFRICAN AMERICAN: >60
GFR NON-AFRICAN AMERICAN: >60 ML/MIN/1.73
GLUCOSE BLD-MCNC: 101 MG/DL (ref 74–109)
HCT VFR BLD CALC: 24.3 % (ref 37–54)
HEMOGLOBIN: 8.1 G/DL (ref 12.5–16.5)
IMMATURE GRANULOCYTES #: 0.21 E9/L
IMMATURE GRANULOCYTES %: 2 % (ref 0–5)
LYMPHOCYTES ABSOLUTE: 1.71 E9/L (ref 1.5–4)
LYMPHOCYTES RELATIVE PERCENT: 16.5 % (ref 20–42)
MAGNESIUM: 2.4 MG/DL (ref 1.6–2.6)
MCH RBC QN AUTO: 29.9 PG (ref 26–35)
MCHC RBC AUTO-ENTMCNC: 33.3 % (ref 32–34.5)
MCV RBC AUTO: 89.7 FL (ref 80–99.9)
MONOCYTES ABSOLUTE: 0.63 E9/L (ref 0.1–0.95)
MONOCYTES RELATIVE PERCENT: 6.1 % (ref 2–12)
NEUTROPHILS ABSOLUTE: 7.67 E9/L (ref 1.8–7.3)
NEUTROPHILS RELATIVE PERCENT: 73.8 % (ref 43–80)
PDW BLD-RTO: 15.7 FL (ref 11.5–15)
PHOSPHORUS: 3.2 MG/DL (ref 2.5–4.5)
PLATELET # BLD: 238 E9/L (ref 130–450)
PMV BLD AUTO: 10.7 FL (ref 7–12)
POTASSIUM SERPL-SCNC: 3.6 MMOL/L (ref 3.5–5)
RBC # BLD: 2.71 E12/L (ref 3.8–5.8)
SODIUM BLD-SCNC: 141 MMOL/L (ref 132–146)
WBC # BLD: 10.4 E9/L (ref 4.5–11.5)

## 2018-03-10 PROCEDURE — 6360000002 HC RX W HCPCS: Performed by: STUDENT IN AN ORGANIZED HEALTH CARE EDUCATION/TRAINING PROGRAM

## 2018-03-10 PROCEDURE — 2700000000 HC OXYGEN THERAPY PER DAY

## 2018-03-10 PROCEDURE — 2580000003 HC RX 258: Performed by: INTERNAL MEDICINE

## 2018-03-10 PROCEDURE — 85025 COMPLETE CBC W/AUTO DIFF WBC: CPT

## 2018-03-10 PROCEDURE — 94668 MNPJ CHEST WALL SBSQ: CPT

## 2018-03-10 PROCEDURE — 2000000000 HC ICU R&B

## 2018-03-10 PROCEDURE — 83735 ASSAY OF MAGNESIUM: CPT

## 2018-03-10 PROCEDURE — 80048 BASIC METABOLIC PNL TOTAL CA: CPT

## 2018-03-10 PROCEDURE — 84100 ASSAY OF PHOSPHORUS: CPT

## 2018-03-10 PROCEDURE — 6360000002 HC RX W HCPCS: Performed by: SURGERY

## 2018-03-10 PROCEDURE — 2580000003 HC RX 258: Performed by: STUDENT IN AN ORGANIZED HEALTH CARE EDUCATION/TRAINING PROGRAM

## 2018-03-10 PROCEDURE — 6370000000 HC RX 637 (ALT 250 FOR IP): Performed by: STUDENT IN AN ORGANIZED HEALTH CARE EDUCATION/TRAINING PROGRAM

## 2018-03-10 PROCEDURE — 94640 AIRWAY INHALATION TREATMENT: CPT

## 2018-03-10 PROCEDURE — 99233 SBSQ HOSP IP/OBS HIGH 50: CPT | Performed by: SURGERY

## 2018-03-10 PROCEDURE — C9113 INJ PANTOPRAZOLE SODIUM, VIA: HCPCS | Performed by: STUDENT IN AN ORGANIZED HEALTH CARE EDUCATION/TRAINING PROGRAM

## 2018-03-10 PROCEDURE — 36415 COLL VENOUS BLD VENIPUNCTURE: CPT

## 2018-03-10 RX ADMIN — Medication 10 ML: at 09:29

## 2018-03-10 RX ADMIN — HEPARIN SODIUM 5000 UNITS: 10000 INJECTION, SOLUTION INTRAVENOUS; SUBCUTANEOUS at 14:26

## 2018-03-10 RX ADMIN — LISINOPRIL 5 MG: 5 TABLET ORAL at 09:21

## 2018-03-10 RX ADMIN — SIMVASTATIN 80 MG: 40 TABLET, FILM COATED ORAL at 20:17

## 2018-03-10 RX ADMIN — METOPROLOL TARTRATE 50 MG: 50 TABLET, FILM COATED ORAL at 20:17

## 2018-03-10 RX ADMIN — Medication 10 ML: at 20:18

## 2018-03-10 RX ADMIN — IPRATROPIUM BROMIDE AND ALBUTEROL SULFATE 1 AMPULE: 2.5; .5 SOLUTION RESPIRATORY (INHALATION) at 17:20

## 2018-03-10 RX ADMIN — PANTOPRAZOLE SODIUM 40 MG: 40 INJECTION, POWDER, FOR SOLUTION INTRAVENOUS at 20:17

## 2018-03-10 RX ADMIN — Medication 10 ML: at 12:39

## 2018-03-10 RX ADMIN — METOPROLOL TARTRATE 50 MG: 50 TABLET, FILM COATED ORAL at 09:21

## 2018-03-10 RX ADMIN — ISOSORBIDE DINITRATE 20 MG: 10 TABLET ORAL at 09:21

## 2018-03-10 RX ADMIN — Medication 10 ML: at 09:30

## 2018-03-10 RX ADMIN — IPRATROPIUM BROMIDE AND ALBUTEROL SULFATE 1 AMPULE: 2.5; .5 SOLUTION RESPIRATORY (INHALATION) at 10:00

## 2018-03-10 RX ADMIN — ISOSORBIDE DINITRATE 20 MG: 10 TABLET ORAL at 20:17

## 2018-03-10 RX ADMIN — IPRATROPIUM BROMIDE AND ALBUTEROL SULFATE 1 AMPULE: 2.5; .5 SOLUTION RESPIRATORY (INHALATION) at 13:45

## 2018-03-10 RX ADMIN — PANTOPRAZOLE SODIUM 40 MG: 40 INJECTION, POWDER, FOR SOLUTION INTRAVENOUS at 09:28

## 2018-03-10 RX ADMIN — ISOSORBIDE DINITRATE 20 MG: 10 TABLET ORAL at 14:30

## 2018-03-10 RX ADMIN — HEPARIN SODIUM 5000 UNITS: 10000 INJECTION, SOLUTION INTRAVENOUS; SUBCUTANEOUS at 20:18

## 2018-03-10 ASSESSMENT — PAIN SCALES - GENERAL
PAINLEVEL_OUTOF10: 0

## 2018-03-10 NOTE — PROGRESS NOTES
[facility]  SURGICAL INTENSIVE CARE UNIT (SICU)  ATTENDING PHYSICIAN CRITICAL CARE PROGRESS NOTE     I have examined the patient, reviewed the record, and discussed the case with the APN/  Resident. I have reviewed all relevant labs and imaging data. Please refer to the  APN/ resident's note. I agree with the  assessment and plan with the following corrections/ additions. The following summarizes my clinical findings and independent assessment. CC: Bleeding from [address]  3/2 embolization of GDA   Required massive transfusion protocol  3/6 underwent EGD--no evidence of upper gi bleeding  3/7 extubated    S. Pt is breathing better. He failed his swallow and has a corpak for feeds    EXAM:  GCS 15  Breathingcomfortably   Follows commands  PERRL  S1S2  Abdomen soft nt  nd          ASSESSMENT:  Principal Problem:    Rectal bleeding  Active Problems:    HCAP (healthcare-associated pneumonia)    Melena    Acute blood loss anemia  Resolved Problems:    * No resolved hospital problems.  *       PLAN:  Off pain meds  Confusion resolved  Ct head negative  ICU delirium resolved    CV:   Hemorrhagic Shock Class IV Resolved  Bleeding appears to be controlled after embolization of the GDA on 3/2  EGD from 3/6 shows no evidence of bleeding    Pulmonary:   Extubated 3/7  Breathing comfortably    GI: NPO s/p bleeding ulcer requiring GDA embolization and massive transfusion  Moderate calorie protein malnutttion--failed swallow--tolerating tube feeds    FEN: Acute kidney injury resolved  Overall fluid negative -1.5L     ID:off abx/ afebrile    Heme: Thrombocytopenia resolved  Hb stable around 8    Endo: Continue Sliding Scale    DVT prophylaxis--SCDS, heparin tid   GI Prophylaxis--PPI  Lines--central line 3/1  CODE: FULL     updated wife at bedside     Meyer Lesch, MD, FACS  3/10/2018  2:31 PM

## 2018-03-11 LAB
ANION GAP SERPL CALCULATED.3IONS-SCNC: 13 MMOL/L (ref 7–16)
BASOPHILS ABSOLUTE: 0.01 E9/L (ref 0–0.2)
BASOPHILS RELATIVE PERCENT: 0.1 % (ref 0–2)
BUN BLDV-MCNC: 16 MG/DL (ref 8–23)
CALCIUM SERPL-MCNC: 7.5 MG/DL (ref 8.6–10.2)
CHLORIDE BLD-SCNC: 106 MMOL/L (ref 98–107)
CO2: 22 MMOL/L (ref 22–29)
CREAT SERPL-MCNC: 0.9 MG/DL (ref 0.7–1.2)
EOSINOPHILS ABSOLUTE: 0.17 E9/L (ref 0.05–0.5)
EOSINOPHILS RELATIVE PERCENT: 1.5 % (ref 0–6)
GFR AFRICAN AMERICAN: >60
GFR NON-AFRICAN AMERICAN: >60 ML/MIN/1.73
GLUCOSE BLD-MCNC: 112 MG/DL (ref 74–109)
HCT VFR BLD CALC: 27.3 % (ref 37–54)
HEMOGLOBIN: 8.8 G/DL (ref 12.5–16.5)
IMMATURE GRANULOCYTES #: 0.12 E9/L
IMMATURE GRANULOCYTES %: 1.1 % (ref 0–5)
LYMPHOCYTES ABSOLUTE: 2.09 E9/L (ref 1.5–4)
LYMPHOCYTES RELATIVE PERCENT: 18.8 % (ref 20–42)
MAGNESIUM: 2.4 MG/DL (ref 1.6–2.6)
MCH RBC QN AUTO: 29.5 PG (ref 26–35)
MCHC RBC AUTO-ENTMCNC: 32.2 % (ref 32–34.5)
MCV RBC AUTO: 91.6 FL (ref 80–99.9)
MONOCYTES ABSOLUTE: 0.77 E9/L (ref 0.1–0.95)
MONOCYTES RELATIVE PERCENT: 6.9 % (ref 2–12)
NEUTROPHILS ABSOLUTE: 7.97 E9/L (ref 1.8–7.3)
NEUTROPHILS RELATIVE PERCENT: 71.6 % (ref 43–80)
PDW BLD-RTO: 16.1 FL (ref 11.5–15)
PHOSPHORUS: 2.2 MG/DL (ref 2.5–4.5)
PLATELET # BLD: 276 E9/L (ref 130–450)
PMV BLD AUTO: 10.8 FL (ref 7–12)
POTASSIUM SERPL-SCNC: 3.6 MMOL/L (ref 3.5–5)
RBC # BLD: 2.98 E12/L (ref 3.8–5.8)
SODIUM BLD-SCNC: 141 MMOL/L (ref 132–146)
WBC # BLD: 11.1 E9/L (ref 4.5–11.5)

## 2018-03-11 PROCEDURE — 84100 ASSAY OF PHOSPHORUS: CPT

## 2018-03-11 PROCEDURE — 6360000002 HC RX W HCPCS: Performed by: SURGERY

## 2018-03-11 PROCEDURE — 94640 AIRWAY INHALATION TREATMENT: CPT

## 2018-03-11 PROCEDURE — 99233 SBSQ HOSP IP/OBS HIGH 50: CPT | Performed by: SURGERY

## 2018-03-11 PROCEDURE — 6370000000 HC RX 637 (ALT 250 FOR IP): Performed by: STUDENT IN AN ORGANIZED HEALTH CARE EDUCATION/TRAINING PROGRAM

## 2018-03-11 PROCEDURE — 80048 BASIC METABOLIC PNL TOTAL CA: CPT

## 2018-03-11 PROCEDURE — 2700000000 HC OXYGEN THERAPY PER DAY

## 2018-03-11 PROCEDURE — 1200000000 HC SEMI PRIVATE

## 2018-03-11 PROCEDURE — 83735 ASSAY OF MAGNESIUM: CPT

## 2018-03-11 PROCEDURE — 2580000003 HC RX 258: Performed by: INTERNAL MEDICINE

## 2018-03-11 PROCEDURE — C9113 INJ PANTOPRAZOLE SODIUM, VIA: HCPCS | Performed by: STUDENT IN AN ORGANIZED HEALTH CARE EDUCATION/TRAINING PROGRAM

## 2018-03-11 PROCEDURE — 85025 COMPLETE CBC W/AUTO DIFF WBC: CPT

## 2018-03-11 PROCEDURE — 36415 COLL VENOUS BLD VENIPUNCTURE: CPT

## 2018-03-11 PROCEDURE — 6370000000 HC RX 637 (ALT 250 FOR IP): Performed by: INTERNAL MEDICINE

## 2018-03-11 PROCEDURE — 6360000002 HC RX W HCPCS: Performed by: STUDENT IN AN ORGANIZED HEALTH CARE EDUCATION/TRAINING PROGRAM

## 2018-03-11 PROCEDURE — 94668 MNPJ CHEST WALL SBSQ: CPT

## 2018-03-11 PROCEDURE — 2500000003 HC RX 250 WO HCPCS: Performed by: STUDENT IN AN ORGANIZED HEALTH CARE EDUCATION/TRAINING PROGRAM

## 2018-03-11 PROCEDURE — 2580000003 HC RX 258: Performed by: STUDENT IN AN ORGANIZED HEALTH CARE EDUCATION/TRAINING PROGRAM

## 2018-03-11 RX ORDER — HYDRALAZINE HYDROCHLORIDE 20 MG/ML
10 INJECTION INTRAMUSCULAR; INTRAVENOUS EVERY 4 HOURS PRN
Status: DISCONTINUED | OUTPATIENT
Start: 2018-03-11 | End: 2018-03-14 | Stop reason: HOSPADM

## 2018-03-11 RX ORDER — PANTOPRAZOLE SODIUM 40 MG/1
40 GRANULE, DELAYED RELEASE ORAL
Status: DISCONTINUED | OUTPATIENT
Start: 2018-03-12 | End: 2018-03-14 | Stop reason: HOSPADM

## 2018-03-11 RX ORDER — LABETALOL HYDROCHLORIDE 5 MG/ML
10 INJECTION, SOLUTION INTRAVENOUS EVERY 4 HOURS PRN
Status: DISCONTINUED | OUTPATIENT
Start: 2018-03-11 | End: 2018-03-14 | Stop reason: HOSPADM

## 2018-03-11 RX ADMIN — HEPARIN SODIUM 5000 UNITS: 10000 INJECTION, SOLUTION INTRAVENOUS; SUBCUTANEOUS at 22:05

## 2018-03-11 RX ADMIN — HEPARIN SODIUM 5000 UNITS: 10000 INJECTION, SOLUTION INTRAVENOUS; SUBCUTANEOUS at 05:31

## 2018-03-11 RX ADMIN — ACETAMINOPHEN 650 MG: 325 TABLET ORAL at 19:59

## 2018-03-11 RX ADMIN — Medication 10 ML: at 22:15

## 2018-03-11 RX ADMIN — PANTOPRAZOLE SODIUM 40 MG: 40 INJECTION, POWDER, FOR SOLUTION INTRAVENOUS at 08:33

## 2018-03-11 RX ADMIN — ALBUTEROL SULFATE 2.5 MG: 2.5 SOLUTION RESPIRATORY (INHALATION) at 05:47

## 2018-03-11 RX ADMIN — ISOSORBIDE DINITRATE 20 MG: 10 TABLET ORAL at 13:26

## 2018-03-11 RX ADMIN — Medication 10 ML: at 08:53

## 2018-03-11 RX ADMIN — ISOSORBIDE DINITRATE 20 MG: 10 TABLET ORAL at 08:28

## 2018-03-11 RX ADMIN — HEPARIN SODIUM 5000 UNITS: 10000 INJECTION, SOLUTION INTRAVENOUS; SUBCUTANEOUS at 13:36

## 2018-03-11 RX ADMIN — IPRATROPIUM BROMIDE AND ALBUTEROL SULFATE 1 AMPULE: 2.5; .5 SOLUTION RESPIRATORY (INHALATION) at 18:23

## 2018-03-11 RX ADMIN — POTASSIUM & SODIUM PHOSPHATES POWDER PACK 280-160-250 MG 250 MG: 280-160-250 PACK at 22:13

## 2018-03-11 RX ADMIN — METOPROLOL TARTRATE 50 MG: 50 TABLET, FILM COATED ORAL at 08:28

## 2018-03-11 RX ADMIN — IPRATROPIUM BROMIDE AND ALBUTEROL SULFATE 1 AMPULE: 2.5; .5 SOLUTION RESPIRATORY (INHALATION) at 08:30

## 2018-03-11 RX ADMIN — Medication 10 ML: at 08:33

## 2018-03-11 RX ADMIN — LABETALOL HYDROCHLORIDE 10 MG: 5 INJECTION, SOLUTION INTRAVENOUS at 05:44

## 2018-03-11 RX ADMIN — IPRATROPIUM BROMIDE AND ALBUTEROL SULFATE 1 AMPULE: 2.5; .5 SOLUTION RESPIRATORY (INHALATION) at 13:10

## 2018-03-11 RX ADMIN — POTASSIUM & SODIUM PHOSPHATES POWDER PACK 280-160-250 MG 250 MG: 280-160-250 PACK at 08:41

## 2018-03-11 RX ADMIN — LISINOPRIL 5 MG: 5 TABLET ORAL at 08:28

## 2018-03-11 RX ADMIN — POTASSIUM & SODIUM PHOSPHATES POWDER PACK 280-160-250 MG 250 MG: 280-160-250 PACK at 18:37

## 2018-03-11 RX ADMIN — POTASSIUM & SODIUM PHOSPHATES POWDER PACK 280-160-250 MG 250 MG: 280-160-250 PACK at 13:26

## 2018-03-11 ASSESSMENT — PAIN SCALES - GENERAL
PAINLEVEL_OUTOF10: 0

## 2018-03-11 NOTE — PROGRESS NOTES
More agitated but doing well  Low grade temp , vs stable  No leukocytosis  RRR  Lungs mostly clear  Continue present measures

## 2018-03-11 NOTE — FLOWSHEET NOTE
Patient continues to reach for bgt while performing ROM unable to redirect at this time, bilateral wrists restraint to bilateral wrists on, for safety  no signs of injury .  Will continue to monitor

## 2018-03-11 NOTE — PLAN OF CARE
Problem: Falls - Risk of  Goal: Absence of falls  Outcome: Met This Shift      Problem: Restraint Use - Nonviolent/Non-Self-Destructive Behavior:  Goal: Absence of restraint indications  Absence of restraint indications    Outcome: Not Met This Shift    Goal: Absence of restraint-related injury  Absence of restraint-related injury    Outcome: Met This Shift

## 2018-03-12 ENCOUNTER — APPOINTMENT (OUTPATIENT)
Dept: GENERAL RADIOLOGY | Age: 78
DRG: 377 | End: 2018-03-12
Payer: MEDICARE

## 2018-03-12 PROCEDURE — G8979 MOBILITY GOAL STATUS: HCPCS | Performed by: PHYSICAL THERAPIST

## 2018-03-12 PROCEDURE — 2580000003 HC RX 258: Performed by: INTERNAL MEDICINE

## 2018-03-12 PROCEDURE — G8978 MOBILITY CURRENT STATUS: HCPCS | Performed by: PHYSICAL THERAPIST

## 2018-03-12 PROCEDURE — 6360000002 HC RX W HCPCS: Performed by: SURGERY

## 2018-03-12 PROCEDURE — 6370000000 HC RX 637 (ALT 250 FOR IP): Performed by: STUDENT IN AN ORGANIZED HEALTH CARE EDUCATION/TRAINING PROGRAM

## 2018-03-12 PROCEDURE — 92611 MOTION FLUOROSCOPY/SWALLOW: CPT

## 2018-03-12 PROCEDURE — G8988 SELF CARE GOAL STATUS: HCPCS

## 2018-03-12 PROCEDURE — 97161 PT EVAL LOW COMPLEX 20 MIN: CPT | Performed by: PHYSICAL THERAPIST

## 2018-03-12 PROCEDURE — 1200000000 HC SEMI PRIVATE

## 2018-03-12 PROCEDURE — 74230 X-RAY XM SWLNG FUNCJ C+: CPT

## 2018-03-12 PROCEDURE — 97166 OT EVAL MOD COMPLEX 45 MIN: CPT

## 2018-03-12 PROCEDURE — 94640 AIRWAY INHALATION TREATMENT: CPT

## 2018-03-12 PROCEDURE — 99232 SBSQ HOSP IP/OBS MODERATE 35: CPT | Performed by: SURGERY

## 2018-03-12 PROCEDURE — 6370000000 HC RX 637 (ALT 250 FOR IP): Performed by: INTERNAL MEDICINE

## 2018-03-12 PROCEDURE — 97530 THERAPEUTIC ACTIVITIES: CPT

## 2018-03-12 PROCEDURE — G8987 SELF CARE CURRENT STATUS: HCPCS

## 2018-03-12 RX ORDER — LORAZEPAM 0.5 MG/1
0.5 TABLET ORAL EVERY 6 HOURS PRN
Status: DISCONTINUED | OUTPATIENT
Start: 2018-03-12 | End: 2018-03-12

## 2018-03-12 RX ORDER — LORAZEPAM 0.5 MG/1
0.5 TABLET ORAL EVERY 6 HOURS PRN
Status: DISCONTINUED | OUTPATIENT
Start: 2018-03-12 | End: 2018-03-14 | Stop reason: HOSPADM

## 2018-03-12 RX ORDER — PANTOPRAZOLE SODIUM 40 MG/1
40 TABLET, DELAYED RELEASE ORAL DAILY
Qty: 30 TABLET | Refills: 0 | Status: SHIPPED | OUTPATIENT
Start: 2018-03-12 | End: 2018-04-12 | Stop reason: CLARIF

## 2018-03-12 RX ADMIN — SIMVASTATIN 80 MG: 40 TABLET, FILM COATED ORAL at 20:57

## 2018-03-12 RX ADMIN — Medication 10 ML: at 09:30

## 2018-03-12 RX ADMIN — HEPARIN SODIUM 5000 UNITS: 10000 INJECTION, SOLUTION INTRAVENOUS; SUBCUTANEOUS at 06:31

## 2018-03-12 RX ADMIN — POTASSIUM & SODIUM PHOSPHATES POWDER PACK 280-160-250 MG 250 MG: 280-160-250 PACK at 09:28

## 2018-03-12 RX ADMIN — IPRATROPIUM BROMIDE AND ALBUTEROL SULFATE 1 AMPULE: 2.5; .5 SOLUTION RESPIRATORY (INHALATION) at 12:45

## 2018-03-12 RX ADMIN — Medication 10 ML: at 22:39

## 2018-03-12 RX ADMIN — IPRATROPIUM BROMIDE AND ALBUTEROL SULFATE 1 AMPULE: 2.5; .5 SOLUTION RESPIRATORY (INHALATION) at 16:29

## 2018-03-12 RX ADMIN — LORAZEPAM 0.5 MG: 0.5 TABLET ORAL at 12:25

## 2018-03-12 RX ADMIN — HEPARIN SODIUM 5000 UNITS: 10000 INJECTION, SOLUTION INTRAVENOUS; SUBCUTANEOUS at 20:58

## 2018-03-12 RX ADMIN — POTASSIUM & SODIUM PHOSPHATES POWDER PACK 280-160-250 MG 250 MG: 280-160-250 PACK at 12:53

## 2018-03-12 RX ADMIN — PANTOPRAZOLE SODIUM 40 MG: 40 GRANULE, DELAYED RELEASE ORAL at 06:31

## 2018-03-12 RX ADMIN — LISINOPRIL 5 MG: 5 TABLET ORAL at 09:28

## 2018-03-12 RX ADMIN — POTASSIUM & SODIUM PHOSPHATES POWDER PACK 280-160-250 MG 250 MG: 280-160-250 PACK at 20:57

## 2018-03-12 RX ADMIN — IPRATROPIUM BROMIDE AND ALBUTEROL SULFATE 1 AMPULE: 2.5; .5 SOLUTION RESPIRATORY (INHALATION) at 08:36

## 2018-03-12 RX ADMIN — METOPROLOL TARTRATE 50 MG: 50 TABLET, FILM COATED ORAL at 20:57

## 2018-03-12 RX ADMIN — ISOSORBIDE DINITRATE 20 MG: 10 TABLET ORAL at 20:57

## 2018-03-12 RX ADMIN — ISOSORBIDE DINITRATE 20 MG: 10 TABLET ORAL at 09:28

## 2018-03-12 RX ADMIN — IPRATROPIUM BROMIDE AND ALBUTEROL SULFATE 1 AMPULE: 2.5; .5 SOLUTION RESPIRATORY (INHALATION) at 19:55

## 2018-03-12 RX ADMIN — METOPROLOL TARTRATE 50 MG: 50 TABLET, FILM COATED ORAL at 09:28

## 2018-03-12 ASSESSMENT — PAIN SCALES - GENERAL: PAINLEVEL_OUTOF10: 0

## 2018-03-12 NOTE — PROGRESS NOTES
[facility]   ATTENDING PHYSICIAN PROGRESS NOTE     I have examined the patient, reviewed the record, and discussed the case with the APN/ Resident. I have reviewed all relevant labs and imaging data. Please refer to the APN/ resident's note. I agree with the assessment and plan with the following corrections/ additions. The following summarizes my clinical findings and independent assessment. CC: GI bleed    Pt without complaints. Denies abdominal pain.     Awake and alert  Follows commands  hrt:  Regular  Lungs:  Fairly clear bilaterally  abd:  Soft; BS+; NT/ND  Skin:  Warm/dry    Patient Active Problem List    Diagnosis Date Noted    Acute blood loss anemia 03/03/2018    Melena 03/02/2018    Rectal bleeding     HCAP (healthcare-associated pneumonia) 03/01/2018       GI bleed--s/p EGD and GDA embolization; repeat EGD with no evidence of ongoing bleeding  Acute blood loss anemia--monitor H/H  Dysphagia--repeat swallow eval; cont TF for now    Eduardo Medrano MD, FACS  3/12/2018  11:01 AM

## 2018-03-12 NOTE — PLAN OF CARE
Notified by nurse patient had removed his restraints. Attempted to reapply, pt refused. Will notify physician on how to proceed. Will continue to monitor. Request for sitter sent.

## 2018-03-12 NOTE — PROGRESS NOTES
Maximal Assist      Bed Mobility  Supine to Sit: NT    Scooting: NT  Sit to Supine: NT    Functional Transfers Sit to stand: Moderate Assist   Stand pivot: Moderate Assist        Functional Mobility Moderate Assist with FWW with 1 LOB and Shortness of breath during functional mobility approx 15 feet    Sit balance: wfl  Stand balance: Mod A  Endurance/Activity tolerance:  poor                              Comments/Treatment:  OT evaluation completed. Upon arrival pt up in chair. Wife present and . Pt demonstrates decreased standing  balance, decreased activity tolerance,  safety awareness, problem solving/insight that limits patient's ability to complete ADL tasks safely and independently. Pt educated on techniques to increase safety and independence with  functional transfers and functional mobility. At end of session pt up in chair with all devices within reach, all lines and tubes intact. Call light and phone within reach.   -- Pt would benefit from continued skilled OT     Assessment of Current Deficits   Functional mobility [x]  ROM [] Strength [x]  Cognition [x]  ADLs [x]   IADLs [x] Safety Awareness [x] Endurance [x]  Fine Motor Coordination [] Balance [x] Vision/perception [] Sensation []   Gross Motor Coordination [x]     Eval Complexity: Med  Profile and History- med  Assessment of Occupational Performance and Identification of Deficits- med  Clinical Decision Making- med    Treatment Frequency:  PRN  2-4 days/week    Plan of Care:   ADL retraining [x]   Equipment needs [x]   Neuromuscular re-education [x] Energy Conservation Techniques [x]  Functional Transfer training [x] Patient and/or Family Education [x]  Functional Mobility training [x]  Environmental Modifications [x]  Cognitive re-training [x]   Compensatory techniques for ADLs [x]  Splinting Needs []   Positioning to improve overall function [x]  Therapeutic strengthening  [x]  Therapeutic activities  [x]   Other: []      Rehab Potential: good

## 2018-03-12 NOTE — PROGRESS NOTES
SPEECH/LANGUAGE PATHOLOGY  VIDEOFLUOROSCOPIC STUDY OF SWALLOWING (MBS)      PATIENT NAME:  Lauren Fleming      :  1940      TODAY'S DATE:  3/12/2018    SUMMARY OF EVALUATION     DYSPHAGIA DIAGNOSIS:  Mild-moderate oropharyngeal dysphagia     DIET RECOMMENDATIONS:  Dental soft diet (soft foods, ground meats) with nectar consistency liquids     COMPENSATORY STRATEGIES:      []Double swallow with []all consistencies []thin []nectar []honey []pureed []ground []chopped []soft solid []solid       []Multiple swallow (  Times) with []all consistencies []thin []nectar []honey []pureed []ground []chopped []soft solid []solid     []Chin tuck with []all consistencies  []thin []nectar []honey []pureed []ground []chopped []soft solid []solid      []Throat clear after with []all consistencies []thin []nectar []honey []pureed []ground []chopped []soft solid []solid      []Effortful swallow with []all consistencies  []thin []nectar []honey []pureed []ground []chopped []soft solid []solid     [x]Small bites/sips        []Alternate solids / liquids      []Check for oral pocketing     [x]No straw            []Spoon sip liquids        []       ASSISTANCE LEVEL:  []No assistance required   [x]Stand by assist   []Full assistance required  []Set up required   []Supervision with all PO intake    [] Malnutrition indicators have been identified and nursing has been notified to ensure a dietary consult is ordered.      THERAPY RECOMMENDATIONS:       [x]  Therapy is not recommended       []  Therapy is recommended to:     []  Improve oral motor strength and range of motion     []  Improve tongue base retraction      []  Improve laryngeal strength and range of motion     []  Address cricopharyngeal dysfunction (Shaker Exercises)    []  Mealtime assessment of patient's tolerance of prescribed diet     []  Repeat Video Swallowing Evaluation is recommended and requires a Physician order    []Therapy at the discretion of facility/treating

## 2018-03-13 LAB
ANION GAP SERPL CALCULATED.3IONS-SCNC: 13 MMOL/L (ref 7–16)
BASOPHILS ABSOLUTE: 0.02 E9/L (ref 0–0.2)
BASOPHILS RELATIVE PERCENT: 0.2 % (ref 0–2)
BUN BLDV-MCNC: 17 MG/DL (ref 8–23)
CALCIUM SERPL-MCNC: 7.4 MG/DL (ref 8.6–10.2)
CHLORIDE BLD-SCNC: 109 MMOL/L (ref 98–107)
CO2: 22 MMOL/L (ref 22–29)
CREAT SERPL-MCNC: 1 MG/DL (ref 0.7–1.2)
EOSINOPHILS ABSOLUTE: 0.27 E9/L (ref 0.05–0.5)
EOSINOPHILS RELATIVE PERCENT: 2.9 % (ref 0–6)
GFR AFRICAN AMERICAN: >60
GFR NON-AFRICAN AMERICAN: >60 ML/MIN/1.73
GLUCOSE BLD-MCNC: 78 MG/DL (ref 74–109)
HCT VFR BLD CALC: 25 % (ref 37–54)
HEMOGLOBIN: 7.5 G/DL (ref 12.5–16.5)
IMMATURE GRANULOCYTES #: 0.07 E9/L
IMMATURE GRANULOCYTES %: 0.8 % (ref 0–5)
LYMPHOCYTES ABSOLUTE: 1.64 E9/L (ref 1.5–4)
LYMPHOCYTES RELATIVE PERCENT: 17.7 % (ref 20–42)
MCH RBC QN AUTO: 28.2 PG (ref 26–35)
MCHC RBC AUTO-ENTMCNC: 30 % (ref 32–34.5)
MCV RBC AUTO: 94 FL (ref 80–99.9)
MONOCYTES ABSOLUTE: 0.56 E9/L (ref 0.1–0.95)
MONOCYTES RELATIVE PERCENT: 6 % (ref 2–12)
NEUTROPHILS ABSOLUTE: 6.7 E9/L (ref 1.8–7.3)
NEUTROPHILS RELATIVE PERCENT: 72.4 % (ref 43–80)
PDW BLD-RTO: 16.5 FL (ref 11.5–15)
PLATELET # BLD: 304 E9/L (ref 130–450)
PMV BLD AUTO: 11 FL (ref 7–12)
POTASSIUM SERPL-SCNC: 4.1 MMOL/L (ref 3.5–5)
RBC # BLD: 2.66 E12/L (ref 3.8–5.8)
SODIUM BLD-SCNC: 144 MMOL/L (ref 132–146)
WBC # BLD: 9.3 E9/L (ref 4.5–11.5)

## 2018-03-13 PROCEDURE — 36415 COLL VENOUS BLD VENIPUNCTURE: CPT

## 2018-03-13 PROCEDURE — 2580000003 HC RX 258: Performed by: INTERNAL MEDICINE

## 2018-03-13 PROCEDURE — 6360000002 HC RX W HCPCS: Performed by: SURGERY

## 2018-03-13 PROCEDURE — 1200000000 HC SEMI PRIVATE

## 2018-03-13 PROCEDURE — 2700000000 HC OXYGEN THERAPY PER DAY

## 2018-03-13 PROCEDURE — 80048 BASIC METABOLIC PNL TOTAL CA: CPT

## 2018-03-13 PROCEDURE — 6370000000 HC RX 637 (ALT 250 FOR IP): Performed by: STUDENT IN AN ORGANIZED HEALTH CARE EDUCATION/TRAINING PROGRAM

## 2018-03-13 PROCEDURE — 6370000000 HC RX 637 (ALT 250 FOR IP): Performed by: INTERNAL MEDICINE

## 2018-03-13 PROCEDURE — 94640 AIRWAY INHALATION TREATMENT: CPT

## 2018-03-13 PROCEDURE — 85025 COMPLETE CBC W/AUTO DIFF WBC: CPT

## 2018-03-13 RX ADMIN — METOPROLOL TARTRATE 50 MG: 50 TABLET, FILM COATED ORAL at 23:57

## 2018-03-13 RX ADMIN — ISOSORBIDE DINITRATE 20 MG: 10 TABLET ORAL at 23:55

## 2018-03-13 RX ADMIN — METOPROLOL TARTRATE 50 MG: 50 TABLET, FILM COATED ORAL at 08:16

## 2018-03-13 RX ADMIN — LORAZEPAM 0.5 MG: 0.5 TABLET ORAL at 02:28

## 2018-03-13 RX ADMIN — HEPARIN SODIUM 5000 UNITS: 10000 INJECTION, SOLUTION INTRAVENOUS; SUBCUTANEOUS at 06:02

## 2018-03-13 RX ADMIN — LISINOPRIL 5 MG: 5 TABLET ORAL at 08:16

## 2018-03-13 RX ADMIN — POTASSIUM & SODIUM PHOSPHATES POWDER PACK 280-160-250 MG 250 MG: 280-160-250 PACK at 08:16

## 2018-03-13 RX ADMIN — PANTOPRAZOLE SODIUM 40 MG: 40 GRANULE, DELAYED RELEASE ORAL at 06:03

## 2018-03-13 RX ADMIN — ISOSORBIDE DINITRATE 20 MG: 10 TABLET ORAL at 14:02

## 2018-03-13 RX ADMIN — SIMVASTATIN 80 MG: 40 TABLET, FILM COATED ORAL at 23:56

## 2018-03-13 RX ADMIN — IPRATROPIUM BROMIDE AND ALBUTEROL SULFATE 1 AMPULE: 2.5; .5 SOLUTION RESPIRATORY (INHALATION) at 16:00

## 2018-03-13 RX ADMIN — POTASSIUM & SODIUM PHOSPHATES POWDER PACK 280-160-250 MG 250 MG: 280-160-250 PACK at 23:56

## 2018-03-13 RX ADMIN — HEPARIN SODIUM 5000 UNITS: 10000 INJECTION, SOLUTION INTRAVENOUS; SUBCUTANEOUS at 23:57

## 2018-03-13 RX ADMIN — HEPARIN SODIUM 5000 UNITS: 10000 INJECTION, SOLUTION INTRAVENOUS; SUBCUTANEOUS at 14:02

## 2018-03-13 RX ADMIN — IPRATROPIUM BROMIDE AND ALBUTEROL SULFATE 1 AMPULE: 2.5; .5 SOLUTION RESPIRATORY (INHALATION) at 11:51

## 2018-03-13 RX ADMIN — Medication 10 ML: at 08:16

## 2018-03-13 RX ADMIN — IPRATROPIUM BROMIDE AND ALBUTEROL SULFATE 1 AMPULE: 2.5; .5 SOLUTION RESPIRATORY (INHALATION) at 19:55

## 2018-03-13 RX ADMIN — ISOSORBIDE DINITRATE 20 MG: 10 TABLET ORAL at 08:16

## 2018-03-13 RX ADMIN — POTASSIUM & SODIUM PHOSPHATES POWDER PACK 280-160-250 MG 250 MG: 280-160-250 PACK at 14:02

## 2018-03-13 RX ADMIN — IPRATROPIUM BROMIDE AND ALBUTEROL SULFATE 1 AMPULE: 2.5; .5 SOLUTION RESPIRATORY (INHALATION) at 07:53

## 2018-03-13 NOTE — CARE COORDINATION
Social Work/Discharge Planning:    SW met with pt and pt wife Davon Lutz and discussed d/c planning in regards to [facility]. Pt and pt wife agreeable to [facility] and would like 1. [facility] 2. [facility] 3. [facility] after choices. SW made referral to [address] [place] [address] at [facility]. Awaiting Decision, SW will follow.     Electronically signed by MYLENE Fox on 3/13/2018 at 12:48 PM

## 2018-03-13 NOTE — PROGRESS NOTES
Continues to improve  Less confused  Tolerating diet  RRR  Lungs mostly clear  Hg trending down  Continue present Rx

## 2018-03-13 NOTE — PROGRESS NOTES
In regards to dr Davis Snare request to ask surgery-Per dr Laura Marc, she said she does not want to order a unit of blood at this time unless there is evidence of bleeding or bloody bms.

## 2018-03-13 NOTE — PLAN OF CARE
Problem: Fluid Volume:  Goal: Fluid loss will decrease  Fluid loss will decrease   Outcome: Met This Shift      Problem: Risk for Impaired Skin Integrity  Goal: Tissue integrity - skin and mucous membranes  Structural intactness and normal physiological function of skin and  mucous membranes.    Outcome: Met This Shift

## 2018-03-13 NOTE — PROGRESS NOTES
Improving    Nutrition Risk Level: Moderate    Nutrition Interventions:   Continued Inpatient Monitoring, Education Initiated, Coordination of Care (Reviewed current diet order)    Nutrition Evaluation:   · Evaluation: Goals set   · Goals: Consume >75% meals/ONS    · Monitoring: Meal Intake, Supplement Intake, Diet Tolerance, Skin Integrity, Fluid Balance, Ascites/Edema, Weight, Comparative Standards, Pertinent Labs, Chewing/Swallowing    See Adult Nutrition Doc Flowsheet for more detail.      Electronically signed by Sharon Franks RD, LD on 3/13/18 at 4:26 PM    Contact Number: [phone]

## 2018-03-13 NOTE — CARE COORDINATION
Social Work/Discharge Planning:    CHAPO spoke with Monster Escoto at [facility], [employer] stated that pt is accepted at [facility] and precert will be started however pt does not have to wait for precert. Monster Escoto stated that pt can d/c to [facility] when medically ready. Charge RN notified. SW notified pt and pt wife of [facility] acceptance. HENS completed, N-17 started, [employer] form and envelope in pt soft chart. SW set up [employer] transportation via [employer]. Per [employer] pt insurance does not cover for wheelchair transportation however pt can be billed at a later time. SW notified pt wife of this and pt wife would  like to billed at a later time. [employer] notified. SW will follow.      Electronically signed by MYLENE Brink on 3/13/2018 at 4:56 PM

## 2018-03-14 VITALS
HEART RATE: 73 BPM | BODY MASS INDEX: 27.88 KG/M2 | OXYGEN SATURATION: 98 % | TEMPERATURE: 99.2 F | SYSTOLIC BLOOD PRESSURE: 130 MMHG | RESPIRATION RATE: 18 BRPM | HEIGHT: 67 IN | WEIGHT: 177.6 LBS | DIASTOLIC BLOOD PRESSURE: 58 MMHG

## 2018-03-14 LAB
ANION GAP SERPL CALCULATED.3IONS-SCNC: 13 MMOL/L (ref 7–16)
BASOPHILS ABSOLUTE: 0.02 E9/L (ref 0–0.2)
BASOPHILS RELATIVE PERCENT: 0.2 % (ref 0–2)
BUN BLDV-MCNC: 20 MG/DL (ref 8–23)
CALCIUM SERPL-MCNC: 7.3 MG/DL (ref 8.6–10.2)
CHLORIDE BLD-SCNC: 106 MMOL/L (ref 98–107)
CO2: 21 MMOL/L (ref 22–29)
CREAT SERPL-MCNC: 1.1 MG/DL (ref 0.7–1.2)
EOSINOPHILS ABSOLUTE: 0.2 E9/L (ref 0.05–0.5)
EOSINOPHILS RELATIVE PERCENT: 2.4 % (ref 0–6)
GFR AFRICAN AMERICAN: >60
GFR NON-AFRICAN AMERICAN: >60 ML/MIN/1.73
GLUCOSE BLD-MCNC: 68 MG/DL (ref 74–109)
HCT VFR BLD CALC: 26 % (ref 37–54)
HEMOGLOBIN: 8 G/DL (ref 12.5–16.5)
IMMATURE GRANULOCYTES #: 0.04 E9/L
IMMATURE GRANULOCYTES %: 0.5 % (ref 0–5)
LYMPHOCYTES ABSOLUTE: 1.72 E9/L (ref 1.5–4)
LYMPHOCYTES RELATIVE PERCENT: 20.8 % (ref 20–42)
MCH RBC QN AUTO: 29.4 PG (ref 26–35)
MCHC RBC AUTO-ENTMCNC: 30.8 % (ref 32–34.5)
MCV RBC AUTO: 95.6 FL (ref 80–99.9)
MONOCYTES ABSOLUTE: 0.55 E9/L (ref 0.1–0.95)
MONOCYTES RELATIVE PERCENT: 6.7 % (ref 2–12)
NEUTROPHILS ABSOLUTE: 5.72 E9/L (ref 1.8–7.3)
NEUTROPHILS RELATIVE PERCENT: 69.4 % (ref 43–80)
PDW BLD-RTO: 17.1 FL (ref 11.5–15)
PLATELET # BLD: 221 E9/L (ref 130–450)
PMV BLD AUTO: 11.8 FL (ref 7–12)
POTASSIUM SERPL-SCNC: 4.2 MMOL/L (ref 3.5–5)
RBC # BLD: 2.72 E12/L (ref 3.8–5.8)
SODIUM BLD-SCNC: 140 MMOL/L (ref 132–146)
WBC # BLD: 8.3 E9/L (ref 4.5–11.5)

## 2018-03-14 PROCEDURE — 2700000000 HC OXYGEN THERAPY PER DAY

## 2018-03-14 PROCEDURE — 6360000002 HC RX W HCPCS: Performed by: SURGERY

## 2018-03-14 PROCEDURE — 6370000000 HC RX 637 (ALT 250 FOR IP): Performed by: STUDENT IN AN ORGANIZED HEALTH CARE EDUCATION/TRAINING PROGRAM

## 2018-03-14 PROCEDURE — 2580000003 HC RX 258: Performed by: INTERNAL MEDICINE

## 2018-03-14 PROCEDURE — 85025 COMPLETE CBC W/AUTO DIFF WBC: CPT

## 2018-03-14 PROCEDURE — 94640 AIRWAY INHALATION TREATMENT: CPT

## 2018-03-14 PROCEDURE — 36415 COLL VENOUS BLD VENIPUNCTURE: CPT

## 2018-03-14 PROCEDURE — 2580000003 HC RX 258

## 2018-03-14 PROCEDURE — 80048 BASIC METABOLIC PNL TOTAL CA: CPT

## 2018-03-14 RX ORDER — HEPARIN SODIUM 10000 [USP'U]/ML
5000 INJECTION, SOLUTION INTRAVENOUS; SUBCUTANEOUS EVERY 8 HOURS SCHEDULED
Qty: 1 VIAL | Refills: 0
Start: 2018-03-14 | End: 2018-04-12 | Stop reason: CLARIF

## 2018-03-14 RX ADMIN — METOPROLOL TARTRATE 50 MG: 50 TABLET, FILM COATED ORAL at 08:39

## 2018-03-14 RX ADMIN — POTASSIUM & SODIUM PHOSPHATES POWDER PACK 280-160-250 MG 250 MG: 280-160-250 PACK at 08:39

## 2018-03-14 RX ADMIN — LISINOPRIL 5 MG: 5 TABLET ORAL at 08:39

## 2018-03-14 RX ADMIN — IPRATROPIUM BROMIDE AND ALBUTEROL SULFATE 1 AMPULE: 2.5; .5 SOLUTION RESPIRATORY (INHALATION) at 08:45

## 2018-03-14 RX ADMIN — PANTOPRAZOLE SODIUM 40 MG: 40 GRANULE, DELAYED RELEASE ORAL at 06:02

## 2018-03-14 RX ADMIN — IPRATROPIUM BROMIDE AND ALBUTEROL SULFATE 1 AMPULE: 2.5; .5 SOLUTION RESPIRATORY (INHALATION) at 12:22

## 2018-03-14 RX ADMIN — HEPARIN SODIUM 5000 UNITS: 10000 INJECTION, SOLUTION INTRAVENOUS; SUBCUTANEOUS at 06:02

## 2018-03-14 RX ADMIN — ISOSORBIDE DINITRATE 20 MG: 10 TABLET ORAL at 08:39

## 2018-03-14 NOTE — CARE COORDINATION
Social Work/Discharge Planning:    Discharge order noted, Pt discharging to [facility], [employer] aware, Pt and pt wife aware, RN aware. Transportation set up via [employer] with [employer] at 2:30pm. SNF/LOC.     Electronically signed by MYLENE Bob on 3/14/2018 at 12:28 PM

## 2018-03-16 ENCOUNTER — TELEPHONE (OUTPATIENT)
Dept: SURGERY | Age: 78
End: 2018-03-16

## 2018-03-19 NOTE — DISCHARGE SUMMARY
[address]                   [address] [facility],  [facility]                                 DISCHARGE SUMMARY    PATIENT NAME: Eric Villalpando                      :        1940  MED REC NO:   [id number]                            ROOM:       8405  ACCOUNT NO:   [de-identified]                           ADMIT DATE: 2018  PROVIDER:     Vonnie Multani DO                     DISCHARGE DATE:  2018    HOSPITAL COURSE:  The patient is a 79-year-old male with history of COPD  and coronary artery disease, status post stents in , on aspirin 81 mg  daily, who was brought to the emergency room by his wife complaining of a  generalized weakness and rectal pain. During the course of his evaluation,  he became much worse with large volumes of rectal bleeding, deteriorating  into hemorrhagic shock with hypovolemic shock necessitating urgent transfer  to the medical intensive care where he was intubated for hypercapnic  respiratory failure. His complicated course included ventilation,  sedation, Protonix drip. He was treated with intravenous antibiotics and  later started on Tamiflu for influenza test positive. He had a significant  high anion gap metabolic acidosis treated with bicarb drip. He was seen by  General Surgery as well as Critical Care Medicine. He required extremely  large volumes of blood products. EGD ultimately demonstrated a duodenal  ulcer. They continued to bleed necessitating consultation with  Interventional Radiology for mesenteric angiogram with embolization of a  gastroduodenal artery for treatment of ongoing duodenal ulcer bleed. The  embolization did actually stop the further deterioration of his upper GI  bleeding. His course was further complicated by the respiratory failure,  community-acquired pneumonia, and acute influenza B.   He also had acute  kidney injury and thrombocytopenia felt secondary to consumption

## 2018-04-12 ENCOUNTER — OFFICE VISIT (OUTPATIENT)
Dept: SURGERY | Age: 78
End: 2018-04-12
Payer: MEDICARE

## 2018-04-12 VITALS
WEIGHT: 180 LBS | HEART RATE: 67 BPM | TEMPERATURE: 97.8 F | HEIGHT: 72 IN | BODY MASS INDEX: 24.38 KG/M2 | SYSTOLIC BLOOD PRESSURE: 128 MMHG | DIASTOLIC BLOOD PRESSURE: 64 MMHG | OXYGEN SATURATION: 96 % | RESPIRATION RATE: 18 BRPM

## 2018-04-12 DIAGNOSIS — K26.9 DUODENAL ULCER: Primary | ICD-10-CM

## 2018-04-12 PROCEDURE — G8420 CALC BMI NORM PARAMETERS: HCPCS | Performed by: SURGERY

## 2018-04-12 PROCEDURE — 1123F ACP DISCUSS/DSCN MKR DOCD: CPT | Performed by: SURGERY

## 2018-04-12 PROCEDURE — 1111F DSCHRG MED/CURRENT MED MERGE: CPT | Performed by: SURGERY

## 2018-04-12 PROCEDURE — G8427 DOCREV CUR MEDS BY ELIG CLIN: HCPCS | Performed by: SURGERY

## 2018-04-12 PROCEDURE — 4004F PT TOBACCO SCREEN RCVD TLK: CPT | Performed by: SURGERY

## 2018-04-12 PROCEDURE — 99213 OFFICE O/P EST LOW 20 MIN: CPT | Performed by: SURGERY

## 2018-04-12 PROCEDURE — 4040F PNEUMOC VAC/ADMIN/RCVD: CPT | Performed by: SURGERY

## 2018-04-12 RX ORDER — PANTOPRAZOLE SODIUM 40 MG/1
40 TABLET, DELAYED RELEASE ORAL DAILY
Qty: 30 TABLET | Refills: 3 | Status: SHIPPED | OUTPATIENT
Start: 2018-04-12

## 2018-04-12 ASSESSMENT — ENCOUNTER SYMPTOMS
VOMITING: 0
ABDOMINAL PAIN: 0
DIARRHEA: 0
CONSTIPATION: 0
EYES NEGATIVE: 1
BLOOD IN STOOL: 0
RESPIRATORY NEGATIVE: 1
NAUSEA: 0
HEARTBURN: 0

## 2019-03-19 ENCOUNTER — TELEPHONE (OUTPATIENT)
Dept: CARDIOLOGY | Age: 79
End: 2019-03-19

## 2019-04-22 ENCOUNTER — HOSPITAL ENCOUNTER (OUTPATIENT)
Dept: CT IMAGING | Age: 79
Discharge: HOME | End: 2019-04-24
Payer: MEDICARE

## 2019-04-22 ENCOUNTER — HOSPITAL ENCOUNTER (OUTPATIENT)
Age: 79
Discharge: HOME | End: 2019-04-24
Payer: MEDICARE

## 2019-04-22 DIAGNOSIS — R06.2 WHEEZING: ICD-10-CM

## 2019-04-22 DIAGNOSIS — J40 BRONCHITIS: ICD-10-CM

## 2019-04-22 DIAGNOSIS — F17.210 SMOKING GREATER THAN 40 PACK YEARS: ICD-10-CM

## 2019-04-22 DIAGNOSIS — Z87.891 PERSONAL HISTORY OF TOBACCO USE: ICD-10-CM

## 2019-04-22 PROCEDURE — G0297 LDCT FOR LUNG CA SCREEN: HCPCS

## 2019-04-23 ENCOUNTER — TELEPHONE (OUTPATIENT)
Dept: CASE MANAGEMENT | Age: 79
End: 2019-04-23

## 2019-04-23 NOTE — TELEPHONE ENCOUNTER
No call, encounter opened to process CT Lung Screening. CT Lung Screen 19 :  FINDINGS:   The lack of intravenous contrast limits the evaluation of mediastinal   and vascular structures. This study is also somewhat degraded by   respiratory motion artifact. LUNGS: Mild emphysematous changes are seen within the lung apices. No   pleural effusion or pneumothorax is seen. A 0.4 cm solid pulmonary   nodule is identified within the left lower lobe (series 4, image 26). HEART: Atherosclerotic calcifications are seen within the coronary   arteries. AORTA: The aorta appears to be unremarkable. MEDIASTINUM: The mediastinum is unremarkable. UPPER ABDOMEN: A small hiatal hernia is present. OTHER:Unremarkable           Impression       1. Lung RADS category 3 (probably benign). A six-month follow-up chest   CT is recommended for the left lower lobe pulmonary nodule. 2. Small hiatal hernia. 3. Coronary atherosclerotic disease.        Pack years: 49    Social History     Tobacco Use   Smoking Status Former Smoker    Packs/day: 1.00    Years: 50.00    Pack years: 50.00    Types: Cigarettes    Last attempt to quit: 3/1/2018    Years since quittin.1   Smokeless Tobacco Never Used               MICHEL Kingsley., R.T.(R)(T)  Lung Nodule Navigator  [address] [facility]  [phone]

## 2019-05-08 ENCOUNTER — HOSPITAL ENCOUNTER (OUTPATIENT)
Dept: CARDIOLOGY | Age: 79
Discharge: HOME | End: 2019-05-08
Payer: MEDICARE

## 2019-05-08 DIAGNOSIS — R06.02 SOB (SHORTNESS OF BREATH) ON EXERTION: ICD-10-CM

## 2019-05-08 DIAGNOSIS — R06.2 WHEEZING: ICD-10-CM

## 2019-05-08 DIAGNOSIS — R60.0 BILATERAL LEG EDEMA: ICD-10-CM

## 2019-05-08 LAB
LV EF: 60 %
LVEF MODALITY: NORMAL

## 2019-05-08 PROCEDURE — 93306 TTE W/DOPPLER COMPLETE: CPT | Performed by: PSYCHIATRY & NEUROLOGY

## 2019-07-16 ENCOUNTER — TELEPHONE (OUTPATIENT)
Dept: ADMINISTRATIVE | Age: 79
End: 2019-07-16

## 2019-08-22 ENCOUNTER — OFFICE VISIT (OUTPATIENT)
Dept: CARDIOLOGY CLINIC | Age: 79
End: 2019-08-22
Payer: MEDICARE

## 2019-08-22 VITALS
WEIGHT: 214 LBS | OXYGEN SATURATION: 93 % | DIASTOLIC BLOOD PRESSURE: 60 MMHG | RESPIRATION RATE: 20 BRPM | BODY MASS INDEX: 28.99 KG/M2 | HEART RATE: 64 BPM | HEIGHT: 72 IN | SYSTOLIC BLOOD PRESSURE: 144 MMHG

## 2019-08-22 DIAGNOSIS — I35.9 AORTIC VALVE DISEASE: ICD-10-CM

## 2019-08-22 DIAGNOSIS — J44.9 CHRONIC OBSTRUCTIVE PULMONARY DISEASE, UNSPECIFIED COPD TYPE (HCC): ICD-10-CM

## 2019-08-22 DIAGNOSIS — I10 ESSENTIAL HYPERTENSION: ICD-10-CM

## 2019-08-22 DIAGNOSIS — I25.10 CORONARY ARTERY DISEASE INVOLVING NATIVE CORONARY ARTERY OF NATIVE HEART WITHOUT ANGINA PECTORIS: Primary | ICD-10-CM

## 2019-08-22 DIAGNOSIS — E78.00 PURE HYPERCHOLESTEROLEMIA: ICD-10-CM

## 2019-08-22 PROCEDURE — G8427 DOCREV CUR MEDS BY ELIG CLIN: HCPCS | Performed by: INTERNAL MEDICINE

## 2019-08-22 PROCEDURE — G8926 SPIRO NO PERF OR DOC: HCPCS | Performed by: INTERNAL MEDICINE

## 2019-08-22 PROCEDURE — 99204 OFFICE O/P NEW MOD 45 MIN: CPT | Performed by: INTERNAL MEDICINE

## 2019-08-22 PROCEDURE — 3023F SPIROM DOC REV: CPT | Performed by: INTERNAL MEDICINE

## 2019-08-22 PROCEDURE — G8419 CALC BMI OUT NRM PARAM NOF/U: HCPCS | Performed by: INTERNAL MEDICINE

## 2019-08-22 PROCEDURE — 1123F ACP DISCUSS/DSCN MKR DOCD: CPT | Performed by: INTERNAL MEDICINE

## 2019-08-22 PROCEDURE — 4040F PNEUMOC VAC/ADMIN/RCVD: CPT | Performed by: INTERNAL MEDICINE

## 2019-08-22 PROCEDURE — 1036F TOBACCO NON-USER: CPT | Performed by: INTERNAL MEDICINE

## 2019-08-22 PROCEDURE — 93000 ELECTROCARDIOGRAM COMPLETE: CPT | Performed by: INTERNAL MEDICINE

## 2019-08-22 PROCEDURE — G8599 NO ASA/ANTIPLAT THER USE RNG: HCPCS | Performed by: INTERNAL MEDICINE

## 2019-08-22 NOTE — PROGRESS NOTES
gastrointestinal, genitourinary, endocrinologic, hematologic, musculoskeletal and psychiatric are negative.     Past Medical History:  Past Medical History:   Diagnosis Date    Arthritis     CAD (coronary artery disease)     [facility]     Duodenal ulcer 2018    Hyperlipidemia     Hypertension     Valvular heart disease        Past Surgical History:  Past Surgical History:   Procedure Laterality Date    ENDOSCOPIC ULTRASOUND (LOWER)  2018    EYE SURGERY      cataracts    JOINT REPLACEMENT         Family History:  Family History   Problem Relation Age of Onset    Ovarian Cancer Mother     Heart Attack Father        Social History:  Social History     Socioeconomic History    Marital status:      Spouse name: Not on file    Number of children: Not on file    Years of education: Not on file    Highest education level: Not on file   Occupational History    Occupation: retired-[employer]   Social Needs    Financial resource strain: Not on file    Food insecurity:     Worry: Not on file     Inability: Not on file   [employer] needs:     Medical: Not on file     Non-medical: Not on file   Tobacco Use    Smoking status: Former Smoker     Packs/day: 1.00     Years: 50.00     Pack years: 50.00     Types: Cigarettes     Last attempt to quit: 3/1/2018     Years since quittin.4    Smokeless tobacco: Never Used   Substance and Sexual Activity    Alcohol use: Not Currently     Comment: recovering alcoholic; none since     Drug use: Not Currently     Types: Marijuana     Comment: none since his 20's    Sexual activity: Not on file   Lifestyle    Physical activity:     Days per week: Not on file     Minutes per session: Not on file    Stress: Not on file   Relationships    Social connections:     Talks on phone: Not on file     Gets together: Not on file     Attends [employer] service: Not on file     Active member of club or organization: Not arise.      Follow-up office visit in 1 year. Thank you for allowing me to participate in your patient's care. Please feel free to contact me if you have any questions or concerns. Note: This report was completed utilizing a computerized voice recognition software. Every effort has been made to insure accuracy, however; inadvertent computerized transcription errors may be present. Zainab Byers.  Ciro Adams, [address] [place] [address]    An electronic copy of this consult note was forwarded to Dr. Ozie Felty and Hallie Menezes

## 2019-10-03 ENCOUNTER — TELEPHONE (OUTPATIENT)
Dept: CARDIOLOGY | Age: 79
End: 2019-10-03

## 2019-10-07 ENCOUNTER — TELEPHONE (OUTPATIENT)
Dept: CARDIOLOGY | Age: 79
End: 2019-10-07

## 2019-10-23 ENCOUNTER — TELEPHONE (OUTPATIENT)
Dept: CASE MANAGEMENT | Age: 79
End: 2019-10-23

## 2019-12-13 ENCOUNTER — TELEPHONE (OUTPATIENT)
Dept: CASE MANAGEMENT | Age: 79
End: 2019-12-13

## 2023-11-13 ENCOUNTER — APPOINTMENT (OUTPATIENT)
Dept: CT IMAGING | Age: 83
End: 2023-11-13
Payer: MEDICARE

## 2023-11-13 ENCOUNTER — APPOINTMENT (OUTPATIENT)
Dept: ULTRASOUND IMAGING | Age: 83
End: 2023-11-13
Payer: MEDICARE

## 2023-11-13 ENCOUNTER — HOSPITAL ENCOUNTER (INPATIENT)
Age: 83
LOS: 3 days | Discharge: HOME | End: 2023-11-17
Attending: EMERGENCY MEDICINE | Admitting: INTERNAL MEDICINE
Payer: MEDICARE

## 2023-11-13 DIAGNOSIS — R60.1 GENERALIZED EDEMA: Primary | ICD-10-CM

## 2023-11-13 DIAGNOSIS — A41.9 SEPTICEMIA (HCC): ICD-10-CM

## 2023-11-13 DIAGNOSIS — J96.01 ACUTE RESPIRATORY FAILURE WITH HYPOXIA (HCC): ICD-10-CM

## 2023-11-13 DIAGNOSIS — I25.10 CORONARY ARTERY DISEASE INVOLVING NATIVE CORONARY ARTERY OF NATIVE HEART WITHOUT ANGINA PECTORIS: ICD-10-CM

## 2023-11-13 DIAGNOSIS — I50.31 ACUTE HEART FAILURE WITH PRESERVED EJECTION FRACTION (HCC): ICD-10-CM

## 2023-11-13 DIAGNOSIS — L03.115 CELLULITIS OF RIGHT LOWER EXTREMITY: ICD-10-CM

## 2023-11-13 DIAGNOSIS — J18.9 PNEUMONIA DUE TO INFECTIOUS ORGANISM, UNSPECIFIED LATERALITY, UNSPECIFIED PART OF LUNG: ICD-10-CM

## 2023-11-13 DIAGNOSIS — I38 VHD (VALVULAR HEART DISEASE): ICD-10-CM

## 2023-11-13 LAB
ALBUMIN SERPL-MCNC: 3.3 G/DL (ref 3.5–5.2)
ALP SERPL-CCNC: 196 U/L (ref 40–129)
ALT SERPL-CCNC: 17 U/L (ref 0–40)
ANION GAP SERPL CALCULATED.3IONS-SCNC: 12 MMOL/L (ref 7–16)
AST SERPL-CCNC: 25 U/L (ref 0–39)
BASOPHILS # BLD: 0 K/UL (ref 0–0.2)
BASOPHILS NFR BLD: 0 % (ref 0–2)
BILIRUB SERPL-MCNC: 0.4 MG/DL (ref 0–1.2)
BNP SERPL-MCNC: 4798 PG/ML (ref 0–450)
BUN SERPL-MCNC: 23 MG/DL (ref 6–23)
CALCIUM SERPL-MCNC: 8.8 MG/DL (ref 8.6–10.2)
CHLORIDE SERPL-SCNC: 102 MMOL/L (ref 98–107)
CO2 SERPL-SCNC: 25 MMOL/L (ref 22–29)
CREAT SERPL-MCNC: 1.2 MG/DL (ref 0.7–1.2)
EKG ATRIAL RATE: 74 BPM
EKG P AXIS: 44 DEGREES
EKG P-R INTERVAL: 178 MS
EKG Q-T INTERVAL: 396 MS
EKG QRS DURATION: 98 MS
EKG QTC CALCULATION (BAZETT): 439 MS
EKG R AXIS: 24 DEGREES
EKG T AXIS: 60 DEGREES
EKG VENTRICULAR RATE: 74 BPM
EOSINOPHIL # BLD: 0.85 K/UL (ref 0.05–0.5)
EOSINOPHILS RELATIVE PERCENT: 5 % (ref 0–6)
ERYTHROCYTE [DISTWIDTH] IN BLOOD BY AUTOMATED COUNT: 19.9 % (ref 11.5–15)
FLUAV RNA RESP QL NAA+PROBE: NOT DETECTED
FLUBV RNA RESP QL NAA+PROBE: NOT DETECTED
GFR SERPL CREATININE-BSD FRML MDRD: 59 ML/MIN/1.73M2
GLUCOSE SERPL-MCNC: 115 MG/DL (ref 74–99)
HCT VFR BLD AUTO: 27.7 % (ref 37–54)
HGB BLD-MCNC: 7.8 G/DL (ref 12.5–16.5)
LYMPHOCYTES NFR BLD: 2.04 K/UL (ref 1.5–4)
LYMPHOCYTES RELATIVE PERCENT: 12 % (ref 20–42)
MCH RBC QN AUTO: 20.3 PG (ref 26–35)
MCHC RBC AUTO-ENTMCNC: 28.2 G/DL (ref 32–34.5)
MCV RBC AUTO: 71.9 FL (ref 80–99.9)
MONOCYTES NFR BLD: 1.19 K/UL (ref 0.1–0.95)
MONOCYTES NFR BLD: 7 % (ref 2–12)
NEUTROPHILS NFR BLD: 76 % (ref 43–80)
NEUTS SEG NFR BLD: 12.92 K/UL (ref 1.8–7.3)
PLATELET # BLD AUTO: 448 K/UL (ref 130–450)
PMV BLD AUTO: 10.5 FL (ref 7–12)
POTASSIUM SERPL-SCNC: 4.6 MMOL/L (ref 3.5–5)
PROT SERPL-MCNC: 7.8 G/DL (ref 6.4–8.3)
RBC # BLD AUTO: 3.85 M/UL (ref 3.8–5.8)
RBC # BLD: ABNORMAL 10*6/UL
SARS-COV-2 RNA RESP QL NAA+PROBE: NOT DETECTED
SODIUM SERPL-SCNC: 139 MMOL/L (ref 132–146)
SOURCE: NORMAL
SPECIMEN DESCRIPTION: NORMAL
TROPONIN I SERPL HS-MCNC: 25 NG/L (ref 0–11)
TROPONIN I SERPL HS-MCNC: 28 NG/L (ref 0–11)
WBC OTHER # BLD: 17 K/UL (ref 4.5–11.5)

## 2023-11-13 PROCEDURE — 84484 ASSAY OF TROPONIN QUANT: CPT

## 2023-11-13 PROCEDURE — 2580000003 HC RX 258: Performed by: EMERGENCY MEDICINE

## 2023-11-13 PROCEDURE — 93970 EXTREMITY STUDY: CPT

## 2023-11-13 PROCEDURE — 6370000000 HC RX 637 (ALT 250 FOR IP): Performed by: EMERGENCY MEDICINE

## 2023-11-13 PROCEDURE — 96374 THER/PROPH/DIAG INJ IV PUSH: CPT

## 2023-11-13 PROCEDURE — 2500000003 HC RX 250 WO HCPCS: Performed by: EMERGENCY MEDICINE

## 2023-11-13 PROCEDURE — 87040 BLOOD CULTURE FOR BACTERIA: CPT

## 2023-11-13 PROCEDURE — 71275 CT ANGIOGRAPHY CHEST: CPT

## 2023-11-13 PROCEDURE — 96365 THER/PROPH/DIAG IV INF INIT: CPT

## 2023-11-13 PROCEDURE — 93005 ELECTROCARDIOGRAM TRACING: CPT | Performed by: EMERGENCY MEDICINE

## 2023-11-13 PROCEDURE — 6360000004 HC RX CONTRAST MEDICATION: Performed by: RADIOLOGY

## 2023-11-13 PROCEDURE — 93010 ELECTROCARDIOGRAM REPORT: CPT | Performed by: INTERNAL MEDICINE

## 2023-11-13 PROCEDURE — 83880 ASSAY OF NATRIURETIC PEPTIDE: CPT

## 2023-11-13 PROCEDURE — 96375 TX/PRO/DX INJ NEW DRUG ADDON: CPT

## 2023-11-13 PROCEDURE — 6360000002 HC RX W HCPCS: Performed by: EMERGENCY MEDICINE

## 2023-11-13 PROCEDURE — 87636 SARSCOV2 & INF A&B AMP PRB: CPT

## 2023-11-13 PROCEDURE — 85025 COMPLETE CBC W/AUTO DIFF WBC: CPT

## 2023-11-13 PROCEDURE — 80053 COMPREHEN METABOLIC PANEL: CPT

## 2023-11-13 PROCEDURE — 99285 EMERGENCY DEPT VISIT HI MDM: CPT

## 2023-11-13 RX ORDER — IPRATROPIUM BROMIDE AND ALBUTEROL SULFATE 2.5; .5 MG/3ML; MG/3ML
3 SOLUTION RESPIRATORY (INHALATION) ONCE
Status: COMPLETED | OUTPATIENT
Start: 2023-11-13 | End: 2023-11-13

## 2023-11-13 RX ORDER — IPRATROPIUM BROMIDE AND ALBUTEROL SULFATE 2.5; .5 MG/3ML; MG/3ML
1 SOLUTION RESPIRATORY (INHALATION)
Status: DISCONTINUED | OUTPATIENT
Start: 2023-11-13 | End: 2023-11-17 | Stop reason: HOSPADM

## 2023-11-13 RX ORDER — FUROSEMIDE 10 MG/ML
40 INJECTION INTRAMUSCULAR; INTRAVENOUS ONCE
Status: COMPLETED | OUTPATIENT
Start: 2023-11-13 | End: 2023-11-13

## 2023-11-13 RX ADMIN — METHYLPREDNISOLONE SODIUM SUCCINATE 125 MG: 125 INJECTION, POWDER, LYOPHILIZED, FOR SOLUTION INTRAMUSCULAR; INTRAVENOUS at 11:19

## 2023-11-13 RX ADMIN — WATER 1000 MG: 1 INJECTION INTRAMUSCULAR; INTRAVENOUS; SUBCUTANEOUS at 17:20

## 2023-11-13 RX ADMIN — IPRATROPIUM BROMIDE AND ALBUTEROL SULFATE 3 DOSE: .5; 3 SOLUTION RESPIRATORY (INHALATION) at 11:10

## 2023-11-13 RX ADMIN — DOXYCYCLINE 100 MG: 100 INJECTION, POWDER, LYOPHILIZED, FOR SOLUTION INTRAVENOUS at 17:33

## 2023-11-13 RX ADMIN — IOPAMIDOL 75 ML: 755 INJECTION, SOLUTION INTRAVENOUS at 14:02

## 2023-11-13 RX ADMIN — IPRATROPIUM BROMIDE AND ALBUTEROL SULFATE 1 DOSE: .5; 2.5 SOLUTION RESPIRATORY (INHALATION) at 23:04

## 2023-11-13 RX ADMIN — FUROSEMIDE 40 MG: 10 INJECTION, SOLUTION INTRAMUSCULAR; INTRAVENOUS at 17:29

## 2023-11-13 ASSESSMENT — ENCOUNTER SYMPTOMS
COUGH: 1
EYE REDNESS: 0
WHEEZING: 1
NAUSEA: 0
SHORTNESS OF BREATH: 1
ABDOMINAL PAIN: 0
VOMITING: 0

## 2023-11-13 NOTE — ED PROVIDER NOTES
[id number] [facility] Name: Michelle Castro  MRN: [id number]  [address] [place] 1940  Date of evaluation: 11/13/2023  Provider: Barnet Gitelman, DO  PCP: Nevaeh Mcconnell MD  Note Started: 11:03 AM EST 11/13/23    CHIEF COMPLAINT       Chief Complaint   Patient presents with    Shortness of Breath     Reports that his nebulizer machine broke over the weekend and havent been able to catch his breath. Reprots hx of copd. Reports stopped smokes appox 7 yrs ago. Cellulitis     Left lower extremity reports visited pmd reguard his fall than his leg began swelling. Red and hot to touch. HISTORY OF PRESENT ILLNESS: 1 or more Elements       Michelle Castro is a 81 y.o. male who presents to the emergency department with a chief complaint of shortness of breath. The history is obtained from the patient as well as the patient's medical record. The patient presents for shortness of breath over the last week. This moderate in severity. Worse with activity and exertion. Nothing makes it better. He does have an albuterol inhaler at home which is not improving his symptoms. He states his nebulizer machine is not working. He does have cough producing up to yellow sputum. He denies any chest pain, fevers, nausea or vomiting. He does have bilateral lower extremity edema with redness worse on the right, he does not currently follow with a pulmonologist.  He does have a history of coronary artery disease with multiple stents placed. He does have a history of DVT. He is not currently on any anticoagulation. Nursing Notes were all reviewed and agreed with or any disagreements were addressed in the HPI. REVIEW OF SYSTEMS :      Review of Systems   Constitutional:  Negative for fever. HENT:  Negative for congestion. Eyes:  Negative for redness. Respiratory:  Positive for cough, shortness of breath and wheezing. Cardiovascular:  Positive for leg swelling.  Negative for

## 2023-11-13 NOTE — ED NOTES
Second set of cultures attempted by 2 RN's. Patient is a difficult stick, OK with  to proceed with antbx with one set of BC drawn.      Jose Hitchcock RN  11/13/23 7048

## 2023-11-13 NOTE — ED NOTES
Blood cultures obtained from [facility], per policy. Set one of two drawn at this time.          Miryam Johnson RN  11/13/23 0788

## 2023-11-14 PROBLEM — J96.00 ACUTE RESPIRATORY FAILURE (HCC): Status: ACTIVE | Noted: 2023-11-14

## 2023-11-14 PROBLEM — J96.01 ACUTE HYPOXIC RESPIRATORY FAILURE (HCC): Status: ACTIVE | Noted: 2023-11-14

## 2023-11-14 LAB
ANION GAP SERPL CALCULATED.3IONS-SCNC: 11 MMOL/L (ref 7–16)
B PARAP IS1001 DNA NPH QL NAA+NON-PROBE: NOT DETECTED
B PERT DNA SPEC QL NAA+PROBE: NOT DETECTED
BACTERIA URNS QL MICRO: ABNORMAL
BASOPHILS # BLD: 0 K/UL (ref 0–0.2)
BASOPHILS NFR BLD: 0 % (ref 0–2)
BILIRUB UR QL STRIP: NEGATIVE
BUN SERPL-MCNC: 21 MG/DL (ref 6–23)
C PNEUM DNA NPH QL NAA+NON-PROBE: NOT DETECTED
CALCIUM SERPL-MCNC: 8.8 MG/DL (ref 8.6–10.2)
CHLORIDE SERPL-SCNC: 103 MMOL/L (ref 98–107)
CHOLEST SERPL-MCNC: 93 MG/DL
CLARITY UR: CLEAR
CO2 SERPL-SCNC: 26 MMOL/L (ref 22–29)
COLOR UR: YELLOW
CREAT SERPL-MCNC: 1.1 MG/DL (ref 0.7–1.2)
CRP SERPL HS-MCNC: 158 MG/L (ref 0–5)
DATE, STOOL #1: NORMAL
EOSINOPHIL # BLD: 0 K/UL (ref 0.05–0.5)
EOSINOPHILS RELATIVE PERCENT: 0 % (ref 0–6)
ERYTHROCYTE [DISTWIDTH] IN BLOOD BY AUTOMATED COUNT: 19.6 % (ref 11.5–15)
ERYTHROCYTE [SEDIMENTATION RATE] IN BLOOD BY WESTERGREN METHOD: 106 MM/HR (ref 0–15)
FERRITIN SERPL-MCNC: 76 NG/ML
FLUAV RNA NPH QL NAA+NON-PROBE: NOT DETECTED
FLUBV RNA NPH QL NAA+NON-PROBE: NOT DETECTED
GFR SERPL CREATININE-BSD FRML MDRD: >60 ML/MIN/1.73M2
GLUCOSE SERPL-MCNC: 148 MG/DL (ref 74–99)
GLUCOSE UR STRIP-MCNC: NEGATIVE MG/DL
HADV DNA NPH QL NAA+NON-PROBE: NOT DETECTED
HBA1C MFR BLD: 5.7 % (ref 4–5.6)
HCOV 229E RNA NPH QL NAA+NON-PROBE: NOT DETECTED
HCOV HKU1 RNA NPH QL NAA+NON-PROBE: NOT DETECTED
HCOV NL63 RNA NPH QL NAA+NON-PROBE: NOT DETECTED
HCOV OC43 RNA NPH QL NAA+NON-PROBE: NOT DETECTED
HCT VFR BLD AUTO: 24.2 % (ref 37–54)
HDLC SERPL-MCNC: 32 MG/DL
HEMOCCULT SP1 STL QL: NEGATIVE
HGB BLD-MCNC: 6.7 G/DL (ref 12.5–16.5)
HGB UR QL STRIP.AUTO: NEGATIVE
HMPV RNA NPH QL NAA+NON-PROBE: NOT DETECTED
HPIV1 RNA NPH QL NAA+NON-PROBE: NOT DETECTED
HPIV2 RNA NPH QL NAA+NON-PROBE: NOT DETECTED
HPIV3 RNA NPH QL NAA+NON-PROBE: NOT DETECTED
HPIV4 RNA NPH QL NAA+NON-PROBE: NOT DETECTED
IRON SATN MFR SERPL: 8 % (ref 20–55)
IRON SERPL-MCNC: 22 UG/DL (ref 59–158)
KETONES UR STRIP-MCNC: NEGATIVE MG/DL
LDLC SERPL CALC-MCNC: 46 MG/DL
LEUKOCYTE ESTERASE UR QL STRIP: NEGATIVE
LYMPHOCYTES NFR BLD: 1.26 K/UL (ref 1.5–4)
LYMPHOCYTES RELATIVE PERCENT: 7 % (ref 20–42)
M PNEUMO DNA NPH QL NAA+NON-PROBE: NOT DETECTED
MAGNESIUM SERPL-MCNC: 1.8 MG/DL (ref 1.6–2.6)
MCH RBC QN AUTO: 20.2 PG (ref 26–35)
MCHC RBC AUTO-ENTMCNC: 27.7 G/DL (ref 32–34.5)
MCV RBC AUTO: 72.9 FL (ref 80–99.9)
MONOCYTES NFR BLD: 0.63 K/UL (ref 0.1–0.95)
MONOCYTES NFR BLD: 4 % (ref 2–12)
NEUTROPHILS NFR BLD: 90 % (ref 43–80)
NEUTS SEG NFR BLD: 16.21 K/UL (ref 1.8–7.3)
NITRITE UR QL STRIP: NEGATIVE
PH UR STRIP: 6 [PH] (ref 5–9)
PLATELET # BLD AUTO: 517 K/UL (ref 130–450)
PMV BLD AUTO: 10.3 FL (ref 7–12)
POTASSIUM SERPL-SCNC: 4.2 MMOL/L (ref 3.5–5)
PROCALCITONIN SERPL-MCNC: 1.67 NG/ML (ref 0–0.08)
PROT UR STRIP-MCNC: ABNORMAL MG/DL
RBC # BLD AUTO: 3.32 M/UL (ref 3.8–5.8)
RBC # BLD: ABNORMAL 10*6/UL
RBC #/AREA URNS HPF: ABNORMAL /HPF
RSV RNA NPH QL NAA+NON-PROBE: NOT DETECTED
RV+EV RNA NPH QL NAA+NON-PROBE: NOT DETECTED
SARS-COV-2 RNA NPH QL NAA+NON-PROBE: NOT DETECTED
SODIUM SERPL-SCNC: 140 MMOL/L (ref 132–146)
SP GR UR STRIP: 1.02 (ref 1–1.03)
SPECIMEN DESCRIPTION: NORMAL
T4 FREE SERPL-MCNC: 1.5 NG/DL (ref 0.9–1.7)
TIBC SERPL-MCNC: 290 UG/DL (ref 250–450)
TIME, STOOL #1: NORMAL
TRIGL SERPL-MCNC: 75 MG/DL
TSH SERPL DL<=0.05 MIU/L-ACNC: 0.2 UIU/ML (ref 0.27–4.2)
UROBILINOGEN UR STRIP-ACNC: 0.2 EU/DL (ref 0–1)
VLDLC SERPL CALC-MCNC: 15 MG/DL
WBC #/AREA URNS HPF: ABNORMAL /HPF
WBC OTHER # BLD: 18.1 K/UL (ref 4.5–11.5)

## 2023-11-14 PROCEDURE — 80048 BASIC METABOLIC PNL TOTAL CA: CPT

## 2023-11-14 PROCEDURE — 83540 ASSAY OF IRON: CPT

## 2023-11-14 PROCEDURE — 85652 RBC SED RATE AUTOMATED: CPT

## 2023-11-14 PROCEDURE — 87205 SMEAR GRAM STAIN: CPT

## 2023-11-14 PROCEDURE — 87070 CULTURE OTHR SPECIMN AEROBIC: CPT

## 2023-11-14 PROCEDURE — 84145 PROCALCITONIN (PCT): CPT

## 2023-11-14 PROCEDURE — 87899 AGENT NOS ASSAY W/OPTIC: CPT

## 2023-11-14 PROCEDURE — 86403 PARTICLE AGGLUT ANTBDY SCRN: CPT

## 2023-11-14 PROCEDURE — 86900 BLOOD TYPING SEROLOGIC ABO: CPT

## 2023-11-14 PROCEDURE — 99222 1ST HOSP IP/OBS MODERATE 55: CPT | Performed by: SURGERY

## 2023-11-14 PROCEDURE — 2060000000 HC ICU INTERMEDIATE R&B

## 2023-11-14 PROCEDURE — 86901 BLOOD TYPING SEROLOGIC RH(D): CPT

## 2023-11-14 PROCEDURE — 83036 HEMOGLOBIN GLYCOSYLATED A1C: CPT

## 2023-11-14 PROCEDURE — 6370000000 HC RX 637 (ALT 250 FOR IP): Performed by: INTERNAL MEDICINE

## 2023-11-14 PROCEDURE — 94640 AIRWAY INHALATION TREATMENT: CPT

## 2023-11-14 PROCEDURE — 30233N1 TRANSFUSION OF NONAUTOLOGOUS RED BLOOD CELLS INTO PERIPHERAL VEIN, PERCUTANEOUS APPROACH: ICD-10-PCS | Performed by: INTERNAL MEDICINE

## 2023-11-14 PROCEDURE — 6370000000 HC RX 637 (ALT 250 FOR IP): Performed by: NURSE PRACTITIONER

## 2023-11-14 PROCEDURE — 2700000000 HC OXYGEN THERAPY PER DAY

## 2023-11-14 PROCEDURE — 82270 OCCULT BLOOD FECES: CPT

## 2023-11-14 PROCEDURE — 0202U NFCT DS 22 TRGT SARS-COV-2: CPT

## 2023-11-14 PROCEDURE — 85025 COMPLETE CBC W/AUTO DIFF WBC: CPT

## 2023-11-14 PROCEDURE — 6360000002 HC RX W HCPCS: Performed by: INTERNAL MEDICINE

## 2023-11-14 PROCEDURE — 86140 C-REACTIVE PROTEIN: CPT

## 2023-11-14 PROCEDURE — 84439 ASSAY OF FREE THYROXINE: CPT

## 2023-11-14 PROCEDURE — 80061 LIPID PANEL: CPT

## 2023-11-14 PROCEDURE — 82728 ASSAY OF FERRITIN: CPT

## 2023-11-14 PROCEDURE — 99223 1ST HOSP IP/OBS HIGH 75: CPT | Performed by: INTERNAL MEDICINE

## 2023-11-14 PROCEDURE — 81001 URINALYSIS AUTO W/SCOPE: CPT

## 2023-11-14 PROCEDURE — 2580000003 HC RX 258: Performed by: INTERNAL MEDICINE

## 2023-11-14 PROCEDURE — 6370000000 HC RX 637 (ALT 250 FOR IP): Performed by: EMERGENCY MEDICINE

## 2023-11-14 PROCEDURE — 83550 IRON BINDING TEST: CPT

## 2023-11-14 PROCEDURE — 86923 COMPATIBILITY TEST ELECTRIC: CPT

## 2023-11-14 PROCEDURE — A4216 STERILE WATER/SALINE, 10 ML: HCPCS | Performed by: INTERNAL MEDICINE

## 2023-11-14 PROCEDURE — C9113 INJ PANTOPRAZOLE SODIUM, VIA: HCPCS | Performed by: INTERNAL MEDICINE

## 2023-11-14 PROCEDURE — 86850 RBC ANTIBODY SCREEN: CPT

## 2023-11-14 PROCEDURE — 6360000002 HC RX W HCPCS: Performed by: NURSE PRACTITIONER

## 2023-11-14 PROCEDURE — 83735 ASSAY OF MAGNESIUM: CPT

## 2023-11-14 PROCEDURE — P9016 RBC LEUKOCYTES REDUCED: HCPCS

## 2023-11-14 PROCEDURE — 87449 NOS EACH ORGANISM AG IA: CPT

## 2023-11-14 PROCEDURE — 84443 ASSAY THYROID STIM HORMONE: CPT

## 2023-11-14 PROCEDURE — 36430 TRANSFUSION BLD/BLD COMPNT: CPT

## 2023-11-14 PROCEDURE — 36415 COLL VENOUS BLD VENIPUNCTURE: CPT

## 2023-11-14 RX ORDER — BUDESONIDE 0.5 MG/2ML
500 INHALANT ORAL
Status: DISCONTINUED | OUTPATIENT
Start: 2023-11-14 | End: 2023-11-17 | Stop reason: HOSPADM

## 2023-11-14 RX ORDER — ATORVASTATIN CALCIUM 40 MG/1
40 TABLET, FILM COATED ORAL DAILY
Status: DISCONTINUED | OUTPATIENT
Start: 2023-11-14 | End: 2023-11-17 | Stop reason: HOSPADM

## 2023-11-14 RX ORDER — GUAIFENESIN 400 MG/1
400 TABLET ORAL 3 TIMES DAILY
Status: DISCONTINUED | OUTPATIENT
Start: 2023-11-14 | End: 2023-11-17 | Stop reason: HOSPADM

## 2023-11-14 RX ORDER — PANTOPRAZOLE SODIUM 40 MG/1
40 TABLET, DELAYED RELEASE ORAL DAILY
Status: DISCONTINUED | OUTPATIENT
Start: 2023-11-14 | End: 2023-11-14

## 2023-11-14 RX ORDER — METOPROLOL TARTRATE 50 MG/1
50 TABLET, FILM COATED ORAL 2 TIMES DAILY
Status: DISCONTINUED | OUTPATIENT
Start: 2023-11-14 | End: 2023-11-17

## 2023-11-14 RX ORDER — ARFORMOTEROL TARTRATE 15 UG/2ML
15 SOLUTION RESPIRATORY (INHALATION)
Status: DISCONTINUED | OUTPATIENT
Start: 2023-11-14 | End: 2023-11-17 | Stop reason: HOSPADM

## 2023-11-14 RX ORDER — LEVOFLOXACIN 5 MG/ML
750 INJECTION, SOLUTION INTRAVENOUS EVERY 24 HOURS
Status: DISCONTINUED | OUTPATIENT
Start: 2023-11-14 | End: 2023-11-16

## 2023-11-14 RX ORDER — ENOXAPARIN SODIUM 100 MG/ML
40 INJECTION SUBCUTANEOUS DAILY
Status: DISCONTINUED | OUTPATIENT
Start: 2023-11-14 | End: 2023-11-14

## 2023-11-14 RX ORDER — LISINOPRIL 5 MG/1
5 TABLET ORAL DAILY
Status: DISCONTINUED | OUTPATIENT
Start: 2023-11-14 | End: 2023-11-17 | Stop reason: HOSPADM

## 2023-11-14 RX ORDER — ACETAMINOPHEN 325 MG/1
650 TABLET ORAL EVERY 6 HOURS PRN
Status: DISCONTINUED | OUTPATIENT
Start: 2023-11-14 | End: 2023-11-17 | Stop reason: HOSPADM

## 2023-11-14 RX ORDER — ISOSORBIDE MONONITRATE 30 MG/1
60 TABLET, EXTENDED RELEASE ORAL DAILY
Status: DISCONTINUED | OUTPATIENT
Start: 2023-11-14 | End: 2023-11-17 | Stop reason: HOSPADM

## 2023-11-14 RX ORDER — SODIUM CHLORIDE 9 MG/ML
INJECTION, SOLUTION INTRAVENOUS PRN
Status: DISCONTINUED | OUTPATIENT
Start: 2023-11-14 | End: 2023-11-17 | Stop reason: HOSPADM

## 2023-11-14 RX ORDER — DOXYCYCLINE HYCLATE 100 MG/1
100 CAPSULE ORAL 2 TIMES DAILY
Status: DISCONTINUED | OUTPATIENT
Start: 2023-11-14 | End: 2023-11-14

## 2023-11-14 RX ORDER — BENZONATATE 100 MG/1
200 CAPSULE ORAL 3 TIMES DAILY
Status: DISCONTINUED | OUTPATIENT
Start: 2023-11-14 | End: 2023-11-17 | Stop reason: HOSPADM

## 2023-11-14 RX ORDER — FUROSEMIDE 10 MG/ML
40 INJECTION INTRAMUSCULAR; INTRAVENOUS 2 TIMES DAILY
Status: DISCONTINUED | OUTPATIENT
Start: 2023-11-14 | End: 2023-11-16

## 2023-11-14 RX ORDER — AMLODIPINE BESYLATE 5 MG/1
5 TABLET ORAL DAILY
Status: DISCONTINUED | OUTPATIENT
Start: 2023-11-14 | End: 2023-11-17 | Stop reason: HOSPADM

## 2023-11-14 RX ADMIN — DOXYCYCLINE HYCLATE 100 MG: 100 CAPSULE ORAL at 12:25

## 2023-11-14 RX ADMIN — GUAIFENESIN 400 MG: 400 TABLET ORAL at 13:57

## 2023-11-14 RX ADMIN — GUAIFENESIN 400 MG: 400 TABLET ORAL at 09:26

## 2023-11-14 RX ADMIN — LISINOPRIL 5 MG: 5 TABLET ORAL at 09:23

## 2023-11-14 RX ADMIN — BENZONATATE 200 MG: 100 CAPSULE ORAL at 09:27

## 2023-11-14 RX ADMIN — IPRATROPIUM BROMIDE AND ALBUTEROL SULFATE 1 DOSE: .5; 2.5 SOLUTION RESPIRATORY (INHALATION) at 08:39

## 2023-11-14 RX ADMIN — BUDESONIDE 500 MCG: 0.5 SUSPENSION RESPIRATORY (INHALATION) at 19:11

## 2023-11-14 RX ADMIN — METOPROLOL TARTRATE 50 MG: 50 TABLET ORAL at 21:38

## 2023-11-14 RX ADMIN — PANTOPRAZOLE SODIUM 40 MG: 40 INJECTION, POWDER, FOR SOLUTION INTRAVENOUS at 14:06

## 2023-11-14 RX ADMIN — BENZONATATE 200 MG: 100 CAPSULE ORAL at 21:38

## 2023-11-14 RX ADMIN — GUAIFENESIN 400 MG: 400 TABLET ORAL at 21:38

## 2023-11-14 RX ADMIN — FUROSEMIDE 40 MG: 10 INJECTION, SOLUTION INTRAMUSCULAR; INTRAVENOUS at 17:42

## 2023-11-14 RX ADMIN — BENZONATATE 200 MG: 100 CAPSULE ORAL at 13:57

## 2023-11-14 RX ADMIN — ISOSORBIDE MONONITRATE 60 MG: 30 TABLET, EXTENDED RELEASE ORAL at 09:22

## 2023-11-14 RX ADMIN — METOPROLOL TARTRATE 50 MG: 50 TABLET ORAL at 09:23

## 2023-11-14 RX ADMIN — PANTOPRAZOLE SODIUM 40 MG: 40 TABLET, DELAYED RELEASE ORAL at 09:23

## 2023-11-14 RX ADMIN — ARFORMOTEROL TARTRATE 15 MCG: 15 SOLUTION RESPIRATORY (INHALATION) at 19:11

## 2023-11-14 RX ADMIN — FUROSEMIDE 40 MG: 10 INJECTION, SOLUTION INTRAMUSCULAR; INTRAVENOUS at 12:26

## 2023-11-14 RX ADMIN — PANTOPRAZOLE SODIUM 40 MG: 40 INJECTION, POWDER, FOR SOLUTION INTRAVENOUS at 21:38

## 2023-11-14 RX ADMIN — ATORVASTATIN CALCIUM 40 MG: 40 TABLET, FILM COATED ORAL at 09:22

## 2023-11-14 RX ADMIN — IPRATROPIUM BROMIDE AND ALBUTEROL SULFATE 1 DOSE: .5; 2.5 SOLUTION RESPIRATORY (INHALATION) at 13:43

## 2023-11-14 RX ADMIN — IPRATROPIUM BROMIDE AND ALBUTEROL SULFATE 1 DOSE: .5; 2.5 SOLUTION RESPIRATORY (INHALATION) at 19:12

## 2023-11-14 RX ADMIN — ACETAMINOPHEN 650 MG: 325 TABLET ORAL at 16:50

## 2023-11-14 RX ADMIN — AMLODIPINE BESYLATE 5 MG: 5 TABLET ORAL at 09:23

## 2023-11-14 ASSESSMENT — PAIN SCALES - GENERAL: PAINLEVEL_OUTOF10: 4

## 2023-11-14 NOTE — ED NOTES
Called wife to let her know patients room number, no answer and her voicemail is full so could not leave a message. Will try again shortly.      Shelley Garcia RN  11/13/23 2163

## 2023-11-14 NOTE — PROGRESS NOTES
4 Eyes Skin Assessment     NAME:  Danielle Bañuelos  YOB: 1940  MEDICAL RECORD NUMBER:  [id number]    The patient is being assessed for  Admission    I agree that at least one RN has performed a thorough Head to Toe Skin Assessment on the patient. ALL assessment sites listed below have been assessed. Areas assessed by both nurses:    Head, Face, Ears, Shoulders, Back, Chest, Arms, Elbows, Hands, Sacrum. Buttock, Coccyx, Ischium, Legs. Feet and Heels, and Under Medical Devices         Does the Patient have a Wound?  No noted wound(s)       Adryan Prevention initiated by RN: Yes  Wound Care Orders initiated by RN: No    Pressure Injury (Stage 3,4, Unstageable, DTI, NWPT, and Complex wounds) if present, place Wound referral order by RN under : No    New Ostomies, if present place, Ostomy referral order under : No     Nurse 1 eSignature: Electronically signed by Mara Jerome RN on 11/14/23 at 3:25 AM EST    **SHARE this note so that the co-signing nurse can place an eSignature**

## 2023-11-14 NOTE — CONSULTS
regurgitation. Moderate aortic regurgitation. Moderate aortic stenosis. Physiologic and/or trace tricuspid regurgitation. PASP is estimated at 44 mmHg. 11/13/23  US  IMPRESSION:  No evidence of DVT in either lower extremity. Labs:  Lab Results   Component Value Date/Time    WBC 17.0 11/13/2023 11:00 AM    RBC 3.85 11/13/2023 11:00 AM    HGB 7.8 11/13/2023 11:00 AM    HCT 27.7 11/13/2023 11:00 AM    MCV 71.9 11/13/2023 11:00 AM    MCH 20.3 11/13/2023 11:00 AM    MCHC 28.2 11/13/2023 11:00 AM    RDW 19.9 11/13/2023 11:00 AM     11/13/2023 11:00 AM    MPV 10.5 11/13/2023 11:00 AM     Lab Results   Component Value Date/Time     11/13/2023 11:00 AM    K 4.6 11/13/2023 11:00 AM    K 3.9 03/09/2018 05:10 AM     11/13/2023 11:00 AM    CO2 25 11/13/2023 11:00 AM    BUN 23 11/13/2023 11:00 AM    CREATININE 1.2 11/13/2023 11:00 AM    LABALBU 3.3 11/13/2023 11:00 AM    CALCIUM 8.8 11/13/2023 11:00 AM    GFRAA >60 03/14/2018 06:17 AM    LABGLOM 59 11/13/2023 11:00 AM     Lab Results   Component Value Date/Time    PROTIME 13.5 03/07/2018 04:30 AM    INR 1.2 03/07/2018 04:30 AM     Recent Labs     11/13/23  1100   PROBNP 4,798*     No results for input(s): \"TROPONINI\" in the last 72 hours. No results for input(s): \"PROCAL\" in the last 72 hours. This SmartLink has not been configured with any valid records. Micro:  No results for input(s): \"CULTRESP\" in the last 72 hours. No results for input(s): \"LABGRAM\" in the last 72 hours. No results for input(s): \"LEGUR\" in the last 72 hours. No results for input(s): \"STREPNEUMAGU\" in the last 72 hours. No results for input(s): \"LP1UAG\" in the last 72 hours. Leann  was evaluated today and a DME order was entered for a nebulizer compressor in order to administer Albuterol due to the diagnosis of COPD stage IV. The need for this equipment and treatment was discussed with the patient and he understands and is in agreement. Assessment:  Acute respiratory insufficiency with hypoxia  Very Severe GOLD stage IV COPD with acute exacerbation. PFTs 2019- FEV1 27% of predicted. Acute tracheobronchitis/URI  Leukocytosis   LE cellulitis  Chronic Bronchitis  Emphysema, upper lobe predominance  Prior nicotine abuse 50 pk yrs quit 2018  Former ETOH abuse quit 1991  HTN  Aortic stenosis  HFpEF stage II DD  CAD hx cardiac stenting  Aortic aneurysm  Small hiatal hernia  Hx DVT  Chronic anemia  Peptic ulcer disease  Embolization of gastric/duodenal artery 2018 massive GI bleed      Plan:  Oxygen therapy 2 liters wean to keep > 92%  Ambulatory oxygen testing prior to dc  Check strep/legionella antigens, respiratory culture, respiratory viral panel-pending. Check procal, crp, sed rate, pending. Scheduled bronchodilators -duonebs QID. Add brovana and budesonide bid. Resume trelegy for dc  Rocephin and Doxy for CAP coverage  Add guaifenesin, tessalon  Add flutter valve  Received 1 dose IV solumedrol 125 mg in ED. Hold off systemic steroids   Re order home neb machine  Lasix iv x 1. Further rec's per cardiology. Repeat 2 D Echo ordered. DVT, GI prophylaxis - protonix. Not able to tolerate anticoagulation. Add scds  US BLE negative for dvt  Local wound care        Thank you for allowing me to participate in the care of Whitney Goodman. Please feel free to call with questions. This plan of care was reviewed in collaboration with Dr. Aaron Stephens    Electronically signed by ANGELIQUE Paris CNP on 11/14/2023 at 8:42 AM      Note: This report was completed utilizing computer voice recognition software. Every effort has been made to ensure accuracy, however; inadvertent computerized transcription errors may be present      Addendum:    I personally saw, examined and provided care for the patient. Radiographs, labs and medication list were reviewed by me independently. Patient known to me although has not been in our office since 2019.   From a pulmonary standpoint he has very severe stage IV COPD. His FEV1 based on his PFTs back in 2019 was 0.89% 27% of predicted. Patient unfortunately seems that has been out of his triple therapy bronchodilator for a while also his nebulizer broke over the weekend and he is only using his albuterol. His CT scan does not show any significant infiltrates negative for PE. Currently in his bed he is appears comfortable in no acute distress and his CT exam is clear. Also notable his acute on chronic microcytic anemia. Recommendations: We will start on triple bronchodilator therapy including Pulmicort Brovana and DuoNebs. He can be switched back to Trelegy upon discharge  Patient has an elevated sed rate and procalcitonin which at this time I do not believe is from underlying pulmonary cause. Given his history we will check iron studies. Consult surgery to see him. Would recommend to transfuse packed RBCs for hemoglobin below 7  We will check an occult blood stool  Increase PPI to 40 mg twice daily   Patient does not require any further systematic steroids  Would recommend to switch him to Levaquin for 5 days for COPD exacerbation  Consult wound care for his chronic lower extremity edema    I spoke with bedside nursing, therapists and consultants. The case was discussed in detail and plans for care were established. Review of CNP documentation was conducted and revisions were made as appropriate. I agree with the above documented exam, problem list and plan of care.    Lorelei Powell MD

## 2023-11-14 NOTE — PLAN OF CARE
Problem: Discharge Planning  Goal: Discharge to home or other facility with appropriate resources  Outcome: Progressing  Flowsheets (Taken 11/14/2023 3515)  Discharge to home or other facility with appropriate resources: Identify barriers to discharge with patient and caregiver     Problem: Skin/Tissue Integrity  Goal: Absence of new skin breakdown  Description: 1. Monitor for areas of redness and/or skin breakdown  2. Assess vascular access sites hourly  3. Every 4-6 hours minimum:  Change oxygen saturation probe site  4. Every 4-6 hours:  If on nasal continuous positive airway pressure, respiratory therapy assess nares and determine need for appliance change or resting period.   Outcome: Progressing     Problem: Safety - Adult  Goal: Free from fall injury  Outcome: Progressing     Problem: Pain  Goal: Verbalizes/displays adequate comfort level or baseline comfort level  Outcome: Progressing

## 2023-11-14 NOTE — PROGRESS NOTES
Dr. Adama Dallas notified of Hgb of 6.7 through answering services.  Electronically signed by Leonardo Rocha RN on 11/14/2023 at 11:32 AM

## 2023-11-14 NOTE — ED NOTES
N2N [facility] to Penny Peters, [facility]  11/14/23 0006

## 2023-11-14 NOTE — PROGRESS NOTES
Feels somewhat better  Afebrile , vs stable  RRR with distant tones  Decreased breath sounds  Abdomen soft without tenderness  Moderate cellulitis of LLE  For CTA

## 2023-11-14 NOTE — DISCHARGE INSTRUCTIONS

## 2023-11-14 NOTE — PLAN OF CARE
Patient's chart updated to reflect:      . - HF care plan, HF education points and HF discharge instructions.  -Orders: 2 gram sodium diet, daily weights, I/O.  -PCP and cardiology follow up appointments to be scheduled within 7 days of hospital discharge. -CHF education session will be provided to the patient prior to hospital discharge.     Modesto Downs RN   Heart Failure Navigator

## 2023-11-14 NOTE — CONSULTS
GENERAL SURGERY  CONSULT NOTE  11/14/2023    Physician Consulted: Dr. Amy Arevalo  Reason for Consult: GI bleed  Referring Physician: Dr. Edil Pedro    HPI  Christiana Chadwick is a 81 y.o. male who presents to the general surgery service for evaluation of GI bleed. Patient fell on Thursday, and has been having shortness of breath ever since. She went to the ED yesterday and was found to have pneumonia. He got admitted because of the and is being treated for it. Currently, patient denies any hematemesis, hematosis, hematochezia or melena. He says he is tolerating diet without any problems. He also denies any chest pain or abdominal pain. Patient is known to general surgery service. He presented with anemia in 2018, he received an EGD which showed duodenal bleeding ulcer. He eventually got GDA embolization. Patient has not received an EGD ever since. He was recommended to get an outpatient colonoscopy when he was seen in [facility], he did not get an outpatient colonoscopy, and is still refusing to get a colonoscopy. Today, his vital signs are stable. He is on 6 L nasal cannula. His BMP was on remarkable. His CBC showed hemoglobin of 6.7 which dropped from 7.8 yesterday, his white blood count was 18.1. Platelet count was 638. Medical history is significant for CAD status post drug-eluting stent in 2001, COPD, hyperlipidemia, hypertension and PUD. He has no history of abdominal surgery. He is not on any blood thinning medications    He reports that he quit smoking about 5 years ago. His smoking use included cigarettes. He has a 50.00 pack-year smoking history. He has never used smokeless tobacco. He reports that he does not currently use alcohol. He reports that he does not currently use drugs after having used the following drugs: Marijuana Megan Callander).       Past Medical History:   Diagnosis Date    Arthritis     CAD (coronary artery disease)     COPD (chronic obstructive pulmonary disease) (HCC)     Duodenal

## 2023-11-14 NOTE — CONSULTS
[address] [facility]   Inpatient CHF Nurse Navigator Consult      Cardiologist: Dr. Gayle Rios is a 81 y.o. (1940) male with a history of HFpEF, most recent EF: 60%  5/8/19  Lab Results   Component Value Date    LVEF 60 05/08/2019       Patient was awake and alert, laying in bed during the consultation and is agreeable to heart failure education. He was engaged and asked appropriate questions throughout the education session. His wife was at bedside. He is willing to weigh daily and interested in the CHF clinic. Appointments scheduled. Barriers identified during consult contributing to HF Hospitalization:  [] Limited medication adherence   [] Poor health literacy, education regarding HF medications provided   [] Pill box provided to patient  [] Difficulty affording medications  [] Prescription assistance information given     [] Not weighing themselves daily  [x] Weight log provided for easy monitoring  [] Scale provided     [] Not following low sodium diet  [] Food insecurity   [x] 2 gram sodium diet education provided   [] Low sodium recipes provided  [] Sodium free seasoning provided   [] Low sodium meal delivery options given to patient  [] Dietician consulted     [] Lack of transportation to appointments     [] Depression, given chronic illness  [] Primary team notified     [] Goals of care need addressed  [] Palliative care consulted     [] CHF CHW consulted, to assist with         Chart Reviewed:  Diet: ADULT DIET; Regular   Daily Weights: No data found.   I/O:   Intake/Output Summary (Last 24 hours) at 11/14/2023 1400  Last data filed at 11/14/2023 0602  Gross per 24 hour   Intake 77.96 ml   Output 1380 ml   Net -1302.04 ml       [] Nursing staff/manager notified of inaccurate jones weights or I/O        Discharge Plan:  Above identified barriers reviewed and needs addressed    Patient/family educated on daily monitoring tools for CHF, made aware of signs and

## 2023-11-14 NOTE — CONSULTS
Inpatient Cardiology Consultation      Reason for Consult:  CAD    Consulting Physician: Dr Sandro Garza    Requesting Physician:  Dr Ivelisse Thurston    Date of Consultation: 11/14/2023    HISTORY OF PRESENT ILLNESS: 82 yo  male known to Dr Irene Elias last seen in office 8/2019. Was ordered Lexiscan stress test but it was never done. WILL ATTEMPT TO GET RECORDS FROM DR MCGEE's OFFICE REGARDING PCI. PMH: CAD s/p PCI, VHD, COPD, HTN, HLD, 50 pack year smoker quit in 2008, HX PUD, and BMI 29. Althia Kaitlin down @ home on Thursday 11/9/2023 felt dizzy, SOB, and fell  does not recall falling ? LOC> could not get up from the floor> EMS was called> got him up to bed and they recommended he come to ED but he refused> breathing worsened over next several days. Nebulizer broke on Friday (usually uses nebulizer AT least twice a day). Does not wear O2. Worsening SOB, with wheezing. Denies CP, pressure or palpitations. + cough with yellow sputum. No fever, chills, diarrhea. Uses albuterol inhaler 2-3 times a day. Sleeping in bed with 2 pillows. + orthopnea. Denies PND. BLE swelling progressively worsening over last month  BLE developed redness over last 1- 1 1/2 weeks. Increase in weakness requiring him to use walker more than cane  Does not drives. Wife drives him. Sedentary 2/2 breathing/SOB  Has 8 steps to climb to get to this apartment and needs to stop half way up due to SOB. Denies COVID-19 during pandemic    [facility] 11/13/2023 /57 HR 80's SR, afebrile, and O2 saturation 98% on RA> developed hypoxia in ED O2 saturation dropped to 84%> placed on 4 liters NC with O2 saturation %. Troponin 28>25, p-BNP 4798, Influenza and COVID-19 NEGATIVE, WBC 17, Hgb 7. 8(no recent CBC), Plt 448, Na 139, K+ 4.6, Bun/Cr 23/1.2, ALT 17, AST 25, Albumin 3.3, T protein 7.8. BLE negative for DVT    CTA Pulmonary read as no PE. Mild chronic bronchitis.   There is some suspected debris within the proximal right lower CKTOTAL 263 03/01/2018 09:52 PM     LIVER PROFILE:  Recent Labs     11/13/23  1100   AST 25   ALT 17   LABALBU 3.3*     COVID19:   Recent Labs     11/13/23  1100   COVID19 Not Detected   A&P per Dr Maximo Huang  Electronically signed by Annmarie Solis. FRANSISCA Gutierres on 11/14/2023 at 8:28 AM      I have personally spent more than 51% of the total time involved in performing this consultation. I have personally and separately seen and evaluated the patient. I personally and separately obtained the history and performed the physical exam.  I personally reviewed all of the above labs, imaging, history, past medical history, social history, family history, surgical history, medications, review of systems, and data. I reviewed available records. All of the assessments and recommendations are personally from me. I personally counseled and educated the patient and coordinated care with other healthcare professionals as needed. I communicated the above and results to patient's family/caregiver if asked or needed. All of the above cardiac medical decisions are personally from me. Please see my additional contributions to the history, physical exam, assessment, and recommendations below. History of chief complaint:  He presented with 1 month of increasing weakness, shortness of breath, and dizziness. He has severe COPD. He uses inhalers and nebulizers multiple times every day. He states that his nebulizer then broke on Friday and he has not been able to use this. He has chronic lower extremity edema but states that this has been significantly increasing as well and his legs are becoming red. Review of systems:     Heart: as above   Lungs: as above   Eyes: denies changes in vision or discharge. Ears: denies changes in hearing or pain. Nose: denies epistaxis or masses   Throat: denies sore throat or trouble swallowing. Neuro: denies numbness, tingling, tremors. Skin: denies rashes or itching.    : denies

## 2023-11-14 NOTE — PROGRESS NOTES
Messaged wound care nurse about new consult.  Electronically signed by Jamila Toro RN on 11/14/2023 at 11:11 AM

## 2023-11-15 PROBLEM — D64.9 ACUTE ANEMIA: Status: ACTIVE | Noted: 2023-11-15

## 2023-11-15 PROBLEM — R60.1 GENERALIZED EDEMA: Status: ACTIVE | Noted: 2023-11-15

## 2023-11-15 PROBLEM — R06.02 SOB (SHORTNESS OF BREATH): Status: ACTIVE | Noted: 2023-11-15

## 2023-11-15 LAB
ABO/RH: NORMAL
ALBUMIN SERPL-MCNC: 3.1 G/DL (ref 3.5–5.2)
ALP SERPL-CCNC: 145 U/L (ref 40–129)
ALT SERPL-CCNC: 19 U/L (ref 0–40)
ANION GAP SERPL CALCULATED.3IONS-SCNC: 12 MMOL/L (ref 7–16)
ANTIBODY SCREEN: NEGATIVE
ARM BAND NUMBER: NORMAL
AST SERPL-CCNC: 24 U/L (ref 0–39)
BASOPHILS # BLD: 0.02 K/UL (ref 0–0.2)
BASOPHILS NFR BLD: 0 % (ref 0–2)
BILIRUB SERPL-MCNC: 0.4 MG/DL (ref 0–1.2)
BLOOD BANK BLOOD PRODUCT EXPIRATION DATE: NORMAL
BLOOD BANK DISPENSE STATUS: NORMAL
BLOOD BANK ISBT PRODUCT BLOOD TYPE: 6200
BLOOD BANK PRODUCT CODE: NORMAL
BLOOD BANK SAMPLE EXPIRATION: NORMAL
BLOOD BANK UNIT TYPE AND RH: NORMAL
BPU ID: NORMAL
BUN SERPL-MCNC: 26 MG/DL (ref 6–23)
CALCIUM SERPL-MCNC: 8.4 MG/DL (ref 8.6–10.2)
CHLORIDE SERPL-SCNC: 103 MMOL/L (ref 98–107)
CO2 SERPL-SCNC: 27 MMOL/L (ref 22–29)
COMPONENT: NORMAL
CREAT SERPL-MCNC: 1.2 MG/DL (ref 0.7–1.2)
CROSSMATCH RESULT: NORMAL
EOSINOPHIL # BLD: 0.09 K/UL (ref 0.05–0.5)
EOSINOPHILS RELATIVE PERCENT: 1 % (ref 0–6)
ERYTHROCYTE [DISTWIDTH] IN BLOOD BY AUTOMATED COUNT: 19.8 % (ref 11.5–15)
GFR SERPL CREATININE-BSD FRML MDRD: 58 ML/MIN/1.73M2
GLUCOSE SERPL-MCNC: 78 MG/DL (ref 74–99)
HCT VFR BLD AUTO: 26.3 % (ref 37–54)
HGB BLD-MCNC: 7.7 G/DL (ref 12.5–16.5)
IMM GRANULOCYTES # BLD AUTO: 0.36 K/UL (ref 0–0.58)
IMM GRANULOCYTES NFR BLD: 2 % (ref 0–5)
L PNEUMO1 AG UR QL IA.RAPID: NEGATIVE
LYMPHOCYTES NFR BLD: 3.17 K/UL (ref 1.5–4)
LYMPHOCYTES RELATIVE PERCENT: 20 % (ref 20–42)
MAGNESIUM SERPL-MCNC: 1.8 MG/DL (ref 1.6–2.6)
MCH RBC QN AUTO: 21.4 PG (ref 26–35)
MCHC RBC AUTO-ENTMCNC: 29.3 G/DL (ref 32–34.5)
MCV RBC AUTO: 73.3 FL (ref 80–99.9)
MONOCYTES NFR BLD: 0.86 K/UL (ref 0.1–0.95)
MONOCYTES NFR BLD: 5 % (ref 2–12)
NEUTROPHILS NFR BLD: 72 % (ref 43–80)
NEUTS SEG NFR BLD: 11.28 K/UL (ref 1.8–7.3)
PLATELET # BLD AUTO: 519 K/UL (ref 130–450)
PMV BLD AUTO: 9.9 FL (ref 7–12)
POTASSIUM SERPL-SCNC: 3.6 MMOL/L (ref 3.5–5)
PROT SERPL-MCNC: 7.4 G/DL (ref 6.4–8.3)
RBC # BLD AUTO: 3.59 M/UL (ref 3.8–5.8)
S PNEUM AG SPEC QL: NEGATIVE
SODIUM SERPL-SCNC: 142 MMOL/L (ref 132–146)
SPECIMEN SOURCE: NORMAL
TRANSFUSION STATUS: NORMAL
UNIT DIVISION: 0
UNIT ISSUE DATE/TIME: NORMAL
WBC OTHER # BLD: 15.8 K/UL (ref 4.5–11.5)

## 2023-11-15 PROCEDURE — 85025 COMPLETE CBC W/AUTO DIFF WBC: CPT

## 2023-11-15 PROCEDURE — 6360000002 HC RX W HCPCS: Performed by: INTERNAL MEDICINE

## 2023-11-15 PROCEDURE — 6370000000 HC RX 637 (ALT 250 FOR IP): Performed by: EMERGENCY MEDICINE

## 2023-11-15 PROCEDURE — 6370000000 HC RX 637 (ALT 250 FOR IP): Performed by: NURSE PRACTITIONER

## 2023-11-15 PROCEDURE — 36415 COLL VENOUS BLD VENIPUNCTURE: CPT

## 2023-11-15 PROCEDURE — 6360000002 HC RX W HCPCS: Performed by: NURSE PRACTITIONER

## 2023-11-15 PROCEDURE — 99232 SBSQ HOSP IP/OBS MODERATE 35: CPT | Performed by: SURGERY

## 2023-11-15 PROCEDURE — 2060000000 HC ICU INTERMEDIATE R&B

## 2023-11-15 PROCEDURE — 6370000000 HC RX 637 (ALT 250 FOR IP): Performed by: INTERNAL MEDICINE

## 2023-11-15 PROCEDURE — 99232 SBSQ HOSP IP/OBS MODERATE 35: CPT | Performed by: INTERNAL MEDICINE

## 2023-11-15 PROCEDURE — 94669 MECHANICAL CHEST WALL OSCILL: CPT

## 2023-11-15 PROCEDURE — 2700000000 HC OXYGEN THERAPY PER DAY

## 2023-11-15 PROCEDURE — 94640 AIRWAY INHALATION TREATMENT: CPT

## 2023-11-15 PROCEDURE — 83735 ASSAY OF MAGNESIUM: CPT

## 2023-11-15 PROCEDURE — 6370000000 HC RX 637 (ALT 250 FOR IP)

## 2023-11-15 PROCEDURE — 80053 COMPREHEN METABOLIC PANEL: CPT

## 2023-11-15 RX ORDER — PANTOPRAZOLE SODIUM 40 MG/1
40 TABLET, DELAYED RELEASE ORAL
Status: DISCONTINUED | OUTPATIENT
Start: 2023-11-15 | End: 2023-11-17 | Stop reason: HOSPADM

## 2023-11-15 RX ORDER — POTASSIUM CHLORIDE 20 MEQ/1
40 TABLET, EXTENDED RELEASE ORAL ONCE
Status: COMPLETED | OUTPATIENT
Start: 2023-11-15 | End: 2023-11-15

## 2023-11-15 RX ORDER — SUCRALFATE 1 G/1
1 TABLET ORAL
Status: DISCONTINUED | OUTPATIENT
Start: 2023-11-15 | End: 2023-11-17 | Stop reason: HOSPADM

## 2023-11-15 RX ORDER — MAGNESIUM SULFATE IN WATER 40 MG/ML
2000 INJECTION, SOLUTION INTRAVENOUS ONCE
Status: COMPLETED | OUTPATIENT
Start: 2023-11-15 | End: 2023-11-15

## 2023-11-15 RX ADMIN — LISINOPRIL 5 MG: 5 TABLET ORAL at 08:50

## 2023-11-15 RX ADMIN — ARFORMOTEROL TARTRATE 15 MCG: 15 SOLUTION RESPIRATORY (INHALATION) at 19:49

## 2023-11-15 RX ADMIN — GUAIFENESIN 400 MG: 400 TABLET ORAL at 14:14

## 2023-11-15 RX ADMIN — PANTOPRAZOLE SODIUM 40 MG: 40 TABLET, DELAYED RELEASE ORAL at 06:45

## 2023-11-15 RX ADMIN — BUDESONIDE 500 MCG: 0.5 SUSPENSION RESPIRATORY (INHALATION) at 09:11

## 2023-11-15 RX ADMIN — BENZONATATE 200 MG: 100 CAPSULE ORAL at 20:18

## 2023-11-15 RX ADMIN — IPRATROPIUM BROMIDE AND ALBUTEROL SULFATE 1 DOSE: .5; 2.5 SOLUTION RESPIRATORY (INHALATION) at 12:15

## 2023-11-15 RX ADMIN — IPRATROPIUM BROMIDE AND ALBUTEROL SULFATE 1 DOSE: .5; 2.5 SOLUTION RESPIRATORY (INHALATION) at 19:49

## 2023-11-15 RX ADMIN — IPRATROPIUM BROMIDE AND ALBUTEROL SULFATE 1 DOSE: .5; 2.5 SOLUTION RESPIRATORY (INHALATION) at 09:10

## 2023-11-15 RX ADMIN — GUAIFENESIN 400 MG: 400 TABLET ORAL at 08:49

## 2023-11-15 RX ADMIN — MAGNESIUM SULFATE HEPTAHYDRATE 2000 MG: 40 INJECTION, SOLUTION INTRAVENOUS at 15:18

## 2023-11-15 RX ADMIN — METOPROLOL TARTRATE 50 MG: 50 TABLET ORAL at 20:18

## 2023-11-15 RX ADMIN — POTASSIUM CHLORIDE 40 MEQ: 1500 TABLET, EXTENDED RELEASE ORAL at 08:49

## 2023-11-15 RX ADMIN — ISOSORBIDE MONONITRATE 60 MG: 30 TABLET, EXTENDED RELEASE ORAL at 08:49

## 2023-11-15 RX ADMIN — FUROSEMIDE 40 MG: 10 INJECTION, SOLUTION INTRAMUSCULAR; INTRAVENOUS at 17:41

## 2023-11-15 RX ADMIN — AMLODIPINE BESYLATE 5 MG: 5 TABLET ORAL at 08:50

## 2023-11-15 RX ADMIN — SUCRALFATE 1 G: 1 TABLET ORAL at 08:50

## 2023-11-15 RX ADMIN — ATORVASTATIN CALCIUM 40 MG: 40 TABLET, FILM COATED ORAL at 08:49

## 2023-11-15 RX ADMIN — BUDESONIDE 500 MCG: 0.5 SUSPENSION RESPIRATORY (INHALATION) at 19:49

## 2023-11-15 RX ADMIN — SUCRALFATE 1 G: 1 TABLET ORAL at 20:18

## 2023-11-15 RX ADMIN — METOPROLOL TARTRATE 50 MG: 50 TABLET ORAL at 08:49

## 2023-11-15 RX ADMIN — BENZONATATE 200 MG: 100 CAPSULE ORAL at 14:14

## 2023-11-15 RX ADMIN — IPRATROPIUM BROMIDE AND ALBUTEROL SULFATE 1 DOSE: .5; 2.5 SOLUTION RESPIRATORY (INHALATION) at 16:31

## 2023-11-15 RX ADMIN — LEVOFLOXACIN 750 MG: 5 INJECTION, SOLUTION INTRAVENOUS at 22:16

## 2023-11-15 RX ADMIN — SUCRALFATE 1 G: 1 TABLET ORAL at 14:13

## 2023-11-15 RX ADMIN — BENZONATATE 200 MG: 100 CAPSULE ORAL at 08:49

## 2023-11-15 RX ADMIN — ARFORMOTEROL TARTRATE 15 MCG: 15 SOLUTION RESPIRATORY (INHALATION) at 09:11

## 2023-11-15 RX ADMIN — FUROSEMIDE 40 MG: 10 INJECTION, SOLUTION INTRAMUSCULAR; INTRAVENOUS at 08:47

## 2023-11-15 RX ADMIN — GUAIFENESIN 400 MG: 400 TABLET ORAL at 20:18

## 2023-11-15 RX ADMIN — SUCRALFATE 1 G: 1 TABLET ORAL at 17:41

## 2023-11-15 RX ADMIN — LEVOFLOXACIN 750 MG: 5 INJECTION, SOLUTION INTRAVENOUS at 00:39

## 2023-11-15 NOTE — ACP (ADVANCE CARE PLANNING)
Advance Care Planning   Healthcare Decision Maker:    Primary Decision Maker: Janet Sanchez - [facility] - [phone]    Click here to complete Healthcare Decision Makers including selection of the Healthcare Decision Maker Relationship (ie \"Primary\").

## 2023-11-15 NOTE — CARE COORDINATION
Transition of Care: Met with pt at bedside to discuss transition of care. He lives with his wife independently. Uses a ww to ambulate. No home O2. PCP . Pharmacy [facility]. Pt has hx with [facility]. He is planning to return home at discharge with no anticipated needs. Pt will need ambulating pulse ox. DME order placed for a nebulizer,left message for Belkis with [facility] DME to follow. Cardiology and Pulmonology following. Awaiting CTA Chest. CM will continue to follow (TF)    Camden Null  Case Management  [phone]          Case Management Assessment  Initial Evaluation    Date/Time of Evaluation: 11/15/2023 11:43 AM  Assessment Completed by: Nolan Arias RN    If patient is discharged prior to next notation, then this note serves as note for discharge by case management. Patient Name: Marcy Estrella                   YOB: 1940  Diagnosis: Generalized edema [R60.1]  Septicemia ([address]) [A41.9]  Acute respiratory failure with hypoxia ([address]) [J96.01]  Acute respiratory failure ([address]) [J96.00]  Acute hypoxic respiratory failure ([address]) [J96.01]                   Date / Time: 11/13/2023 10:14 AM    Patient Admission Status: Inpatient   Readmission Risk (Low < 19, Mod (19-27), High > 27): Readmission Risk Score: 15.3    Current PCP: Destini Oropeza MD  PCP verified by CM? Yes    Chart Reviewed: Yes      History Provided by: Patient  Patient Orientation: Alert and Oriented    Patient Cognition: Alert    Hospitalization in the last 30 days (Readmission):  No    If yes, Readmission Assessment in  Navigator will be completed.     Advance Directives:      Code Status: Full Code   Patient's Primary Decision Maker is: Legal Next of Kin    Primary Decision Maker: Janet Sanchez - Spouse - [phone]    Discharge Planning:    Patient lives with: Spouse/Significant Other Type of Home: House  Primary Care Giver: Self  Patient Support Systems include: Spouse/Significant Other Current Financial resources:    Current community resources:    Current services prior to admission: None            Current DME:              Type of Home Care services:  None    ADLS  Prior functional level: Independent in ADLs/IADLs  Current functional level: Independent in ADLs/IADLs    PT AM-PAC:   /24  OT AM-PAC:   /24    Family can provide assistance at DC: Yes  Would you like Case Management to discuss the discharge plan with any other family members/significant others, and if so, who? No  Plans to Return to Present Housing: Yes  Other Identified Issues/Barriers to RETURNING to current housing:   Potential Assistance needed at discharge: N/A            Potential DME:    Patient expects to discharge to: [address] [place] [address] for transportation at discharge:      Financial    Payor: [id number] / Plan: Asiya Boggs / Product Type: *No Product type* /     Does insurance require precert for SNF: Yes    Potential assistance Purchasing Medications:    Meds-to-Beds request: Yes      [address] [place] [address] Drive  [address] [place] [address]  Phone: [phone] Fax: [phone]      Notes:    Factors facilitating achievement of predicted outcomes: Family support and Cooperative    Barriers to discharge: Medical complications    Additional Case Management Notes: discharge home once medically stable    The Plan for Transition of Care is related to the following treatment goals of Generalized edema [R60.1]  Septicemia ([address]) [A41.9]  Acute respiratory failure with hypoxia ([address]) [J96.01]  Acute respiratory failure ([address]) [J96.00]  Acute hypoxic respiratory failure ([address]) [X47.85]    IF APPLICABLE: The Patient and/or patient representative Elijah Funk and his family were provided with a choice of provider and agrees with the discharge plan.  Freedom of choice list with basic dialogue that supports the patient's individualized plan of care/goals and shares

## 2023-11-15 NOTE — PROGRESS NOTES
Says he feels better overall  Afebrile , vs stable  RRR with distant tones  Abdomen soft without tenderness   This pleasant gentleman has been very poorly compliant with out patient follow up in the past  Continue current measures   Markedly diminished breath sounds

## 2023-11-15 NOTE — PLAN OF CARE
Problem: Discharge Planning  Goal: Discharge to home or other facility with appropriate resources  Outcome: Progressing  Flowsheets (Taken 11/15/2023 1071)  Discharge to home or other facility with appropriate resources: Identify barriers to discharge with patient and caregiver     Problem: Skin/Tissue Integrity  Goal: Absence of new skin breakdown  Description: 1. Monitor for areas of redness and/or skin breakdown  2. Assess vascular access sites hourly  3. Every 4-6 hours minimum:  Change oxygen saturation probe site  4. Every 4-6 hours:  If on nasal continuous positive airway pressure, respiratory therapy assess nares and determine need for appliance change or resting period.   Outcome: Progressing     Problem: Safety - Adult  Goal: Free from fall injury  Outcome: Progressing     Problem: Pain  Goal: Verbalizes/displays adequate comfort level or baseline comfort level  Outcome: Progressing

## 2023-11-15 NOTE — PROGRESS NOTES
Physician Progress Note      Alan Lane  CSN #:                  [id number]  :                       1940  ADMIT DATE:       2023 10:14 AM  DISCH DATE:  RESPONDING  PROVIDER #:        April Lorenzana MD          QUERY TEXT:    Pt admitted with Pneumonia, LLE Cellulitis and acute exacerbation of COPD with   \"HFpEF stage II DD\" documented. If possible, please document in progress   notes and discharge summary further specificity regarding the acuity of CHF:    The medical record reflects the following:  Risk Factors: CHF, PNA, LLE cellulitis  Clinical Indicators: SOB, 2+ RLE pitting edema, 3+ LLE pitting edema; BNP   4,798, CTA chest:  No focal consolidation or pulmonary edema. No evidence of   pleural effusion or pneumothorax. Treatment: Cardiology consult, IV Lasix 40mg 2x/daily, Imdur, Lisinopril,   Lopressor, weights, I&O, CTA chest, monitoring    Thank you,  Carrol Chisholm   Clinical Documentation Improvement Specialist  W: [phone]  Options provided:  -- Chronic Diastolic CHF/HFpEF  -- Acute on Chronic Diastolic CHF/HFpEF  -- Other - I will add my own diagnosis  -- Disagree - Not applicable / Not valid  -- Disagree - Clinically unable to determine / Unknown  -- Refer to Clinical Documentation Reviewer    PROVIDER RESPONSE TEXT:    This patient has chronic diastolic CHF/HFpEF.     Query created by: Carrol Chisholm on 11/15/2023 11:16 AM      Electronically signed by:  April Lorenzana MD 11/15/2023 12:16 PM

## 2023-11-16 ENCOUNTER — APPOINTMENT (OUTPATIENT)
Dept: GENERAL RADIOLOGY | Age: 83
End: 2023-11-16
Payer: MEDICARE

## 2023-11-16 ENCOUNTER — APPOINTMENT (OUTPATIENT)
Age: 83
End: 2023-11-16
Payer: MEDICARE

## 2023-11-16 LAB
ALBUMIN SERPL-MCNC: 2.8 G/DL (ref 3.5–5.2)
ALP SERPL-CCNC: 145 U/L (ref 40–129)
ALT SERPL-CCNC: 25 U/L (ref 0–40)
ANION GAP SERPL CALCULATED.3IONS-SCNC: 14 MMOL/L (ref 7–16)
AST SERPL-CCNC: 28 U/L (ref 0–39)
BASOPHILS # BLD: 0 K/UL (ref 0–0.2)
BASOPHILS NFR BLD: 0 % (ref 0–2)
BILIRUB SERPL-MCNC: 0.4 MG/DL (ref 0–1.2)
BNP SERPL-MCNC: 2264 PG/ML (ref 0–450)
BUN SERPL-MCNC: 24 MG/DL (ref 6–23)
CALCIUM SERPL-MCNC: 8.1 MG/DL (ref 8.6–10.2)
CHLORIDE SERPL-SCNC: 99 MMOL/L (ref 98–107)
CO2 SERPL-SCNC: 24 MMOL/L (ref 22–29)
CREAT SERPL-MCNC: 1.4 MG/DL (ref 0.7–1.2)
ECHO AO ASC DIAM: 3.1 CM
ECHO AO ASCENDING AORTA INDEX: 1.61 CM/M2
ECHO AR MAX VEL PISA: 3.6 M/S
ECHO AV AREA PEAK VELOCITY: 1.8 CM2
ECHO AV AREA VTI: 1.7 CM2
ECHO AV AREA/BSA PEAK VELOCITY: 0.9 CM2/M2
ECHO AV AREA/BSA VTI: 0.9 CM2/M2
ECHO AV CUSP MM: 1.5 CM
ECHO AV MEAN GRADIENT: 15 MMHG
ECHO AV MEAN VELOCITY: 1.8 M/S
ECHO AV PEAK GRADIENT: 27 MMHG
ECHO AV PEAK VELOCITY: 2.6 M/S
ECHO AV REGURGITANT PHT: 353.9 MILLISECOND
ECHO AV VELOCITY RATIO: 0.46
ECHO AV VTI: 51.4 CM
ECHO BSA: 1.9 M2
ECHO EST RA PRESSURE: 3 MMHG
ECHO LA DIAMETER INDEX: 1.72 CM/M2
ECHO LA DIAMETER: 3.3 CM
ECHO LA VOL A-L A2C: 51 ML (ref 18–58)
ECHO LA VOL A-L A4C: 60 ML (ref 18–58)
ECHO LA VOL MOD A2C: 49 ML (ref 18–58)
ECHO LA VOL MOD A4C: 51 ML (ref 18–58)
ECHO LA VOLUME AREA LENGTH: 57 ML
ECHO LA VOLUME INDEX A-L A2C: 27 ML/M2 (ref 16–34)
ECHO LA VOLUME INDEX A-L A4C: 31 ML/M2 (ref 16–34)
ECHO LA VOLUME INDEX AREA LENGTH: 30 ML/M2 (ref 16–34)
ECHO LA VOLUME INDEX MOD A2C: 26 ML/M2 (ref 16–34)
ECHO LA VOLUME INDEX MOD A4C: 27 ML/M2 (ref 16–34)
ECHO LV EDV A2C: 113 ML
ECHO LV EDV A4C: 141 ML
ECHO LV EDV BP: 141 ML (ref 67–155)
ECHO LV EDV INDEX A4C: 73 ML/M2
ECHO LV EDV INDEX BP: 73 ML/M2
ECHO LV EDV NDEX A2C: 59 ML/M2
ECHO LV EF PHYSICIAN: 45 %
ECHO LV EJECTION FRACTION A2C: 24 %
ECHO LV EJECTION FRACTION A4C: 45 %
ECHO LV EJECTION FRACTION BIPLANE: 42 % (ref 55–100)
ECHO LV ESV A2C: 86 ML
ECHO LV ESV A4C: 77 ML
ECHO LV ESV BP: 82 ML (ref 22–58)
ECHO LV ESV INDEX A2C: 45 ML/M2
ECHO LV ESV INDEX A4C: 40 ML/M2
ECHO LV ESV INDEX BP: 43 ML/M2
ECHO LV FRACTIONAL SHORTENING: 29 % (ref 28–44)
ECHO LV INTERNAL DIMENSION DIASTOLE INDEX: 2.5 CM/M2
ECHO LV INTERNAL DIMENSION DIASTOLIC: 4.8 CM (ref 4.2–5.9)
ECHO LV INTERNAL DIMENSION SYSTOLIC INDEX: 1.77 CM/M2
ECHO LV INTERNAL DIMENSION SYSTOLIC: 3.4 CM
ECHO LV ISOVOLUMETRIC RELAXATION TIME (IVRT): 83 MS
ECHO LV IVSD: 1.4 CM (ref 0.6–1)
ECHO LV IVSS: 1.3 CM
ECHO LV MASS 2D: 259.6 G (ref 88–224)
ECHO LV MASS INDEX 2D: 135.2 G/M2 (ref 49–115)
ECHO LV POSTERIOR WALL DIASTOLIC: 1.3 CM (ref 0.6–1)
ECHO LV POSTERIOR WALL SYSTOLIC: 1.7 CM
ECHO LV RELATIVE WALL THICKNESS RATIO: 0.54
ECHO LVOT AREA: 3.8 CM2
ECHO LVOT AV VTI INDEX: 0.46
ECHO LVOT DIAM: 2.2 CM
ECHO LVOT MEAN GRADIENT: 3 MMHG
ECHO LVOT PEAK GRADIENT: 6 MMHG
ECHO LVOT PEAK VELOCITY: 1.2 M/S
ECHO LVOT STROKE VOLUME INDEX: 46.3 ML/M2
ECHO LVOT SV: 88.9 ML
ECHO LVOT VTI: 23.4 CM
ECHO MV "A" WAVE DURATION: 159.2 MSEC
ECHO MV A VELOCITY: 1.22 M/S
ECHO MV AREA PHT: 4 CM2
ECHO MV AREA VTI: 3.4 CM2
ECHO MV E DECELERATION TIME (DT): 250.9 MS
ECHO MV E VELOCITY: 0.64 M/S
ECHO MV E/A RATIO: 0.52
ECHO MV LVOT VTI INDEX: 1.11
ECHO MV MAX VELOCITY: 1.3 M/S
ECHO MV MEAN GRADIENT: 3 MMHG
ECHO MV MEAN VELOCITY: 0.8 M/S
ECHO MV PEAK GRADIENT: 6 MMHG
ECHO MV PRESSURE HALF TIME (PHT): 55.1 MS
ECHO MV VTI: 25.9 CM
ECHO PULMONARY ARTERY END DIASTOLIC PRESSURE: 6 MMHG
ECHO PV MAX VELOCITY: 1.2 M/S
ECHO PV MEAN GRADIENT: 3 MMHG
ECHO PV MEAN VELOCITY: 0.8 M/S
ECHO PV PEAK GRADIENT: 6 MMHG
ECHO PV REGURGITANT MAX VELOCITY: 1.3 M/S
ECHO PV VTI: 18.5 CM
ECHO PVEIN A DURATION: 152.3 MS
ECHO PVEIN A VELOCITY: 0.3 M/S
ECHO PVEIN PEAK D VELOCITY: 0.3 M/S
ECHO PVEIN PEAK S VELOCITY: 0.6 M/S
ECHO PVEIN S/D RATIO: 2
ECHO RIGHT VENTRICULAR SYSTOLIC PRESSURE (RVSP): 41 MMHG
ECHO RV INTERNAL DIMENSION: 3.7 CM
ECHO TV REGURGITANT MAX VELOCITY: 3.1 M/S
ECHO TV REGURGITANT PEAK GRADIENT: 38 MMHG
EOSINOPHIL # BLD: 0.25 K/UL (ref 0.05–0.5)
EOSINOPHILS RELATIVE PERCENT: 2 % (ref 0–6)
ERYTHROCYTE [DISTWIDTH] IN BLOOD BY AUTOMATED COUNT: 21.1 % (ref 11.5–15)
GFR SERPL CREATININE-BSD FRML MDRD: 50 ML/MIN/1.73M2
GLUCOSE SERPL-MCNC: 86 MG/DL (ref 74–99)
HCT VFR BLD AUTO: 32.8 % (ref 37–54)
HGB BLD-MCNC: 9 G/DL (ref 12.5–16.5)
LYMPHOCYTES NFR BLD: 2.29 K/UL (ref 1.5–4)
LYMPHOCYTES RELATIVE PERCENT: 16 % (ref 20–42)
MCH RBC QN AUTO: 21.4 PG (ref 26–35)
MCHC RBC AUTO-ENTMCNC: 27.4 G/DL (ref 32–34.5)
MCV RBC AUTO: 78.1 FL (ref 80–99.9)
MICROORGANISM SPEC CULT: ABNORMAL
MICROORGANISM SPEC CULT: ABNORMAL
MICROORGANISM/AGENT SPEC: ABNORMAL
MONOCYTES NFR BLD: 0.64 K/UL (ref 0.1–0.95)
MONOCYTES NFR BLD: 4 % (ref 2–12)
NEUTROPHILS NFR BLD: 78 % (ref 43–80)
NEUTS SEG NFR BLD: 11.32 K/UL (ref 1.8–7.3)
PLATELET # BLD AUTO: 472 K/UL (ref 130–450)
PMV BLD AUTO: 9.7 FL (ref 7–12)
POTASSIUM SERPL-SCNC: 3.8 MMOL/L (ref 3.5–5)
PROT SERPL-MCNC: 6.8 G/DL (ref 6.4–8.3)
RBC # BLD AUTO: 4.2 M/UL (ref 3.8–5.8)
RBC # BLD: ABNORMAL 10*6/UL
SODIUM SERPL-SCNC: 137 MMOL/L (ref 132–146)
SPECIMEN DESCRIPTION: ABNORMAL
WBC OTHER # BLD: 14.5 K/UL (ref 4.5–11.5)

## 2023-11-16 PROCEDURE — 94669 MECHANICAL CHEST WALL OSCILL: CPT

## 2023-11-16 PROCEDURE — 6370000000 HC RX 637 (ALT 250 FOR IP): Performed by: EMERGENCY MEDICINE

## 2023-11-16 PROCEDURE — 94640 AIRWAY INHALATION TREATMENT: CPT

## 2023-11-16 PROCEDURE — C8929 TTE W OR WO FOL WCON,DOPPLER: HCPCS

## 2023-11-16 PROCEDURE — 80053 COMPREHEN METABOLIC PANEL: CPT

## 2023-11-16 PROCEDURE — 99232 SBSQ HOSP IP/OBS MODERATE 35: CPT | Performed by: INTERNAL MEDICINE

## 2023-11-16 PROCEDURE — 2060000000 HC ICU INTERMEDIATE R&B

## 2023-11-16 PROCEDURE — 83880 ASSAY OF NATRIURETIC PEPTIDE: CPT

## 2023-11-16 PROCEDURE — 2700000000 HC OXYGEN THERAPY PER DAY

## 2023-11-16 PROCEDURE — 85025 COMPLETE CBC W/AUTO DIFF WBC: CPT

## 2023-11-16 PROCEDURE — 93306 TTE W/DOPPLER COMPLETE: CPT | Performed by: INTERNAL MEDICINE

## 2023-11-16 PROCEDURE — 6370000000 HC RX 637 (ALT 250 FOR IP): Performed by: INTERNAL MEDICINE

## 2023-11-16 PROCEDURE — 6370000000 HC RX 637 (ALT 250 FOR IP): Performed by: NURSE PRACTITIONER

## 2023-11-16 PROCEDURE — 6360000002 HC RX W HCPCS: Performed by: NURSE PRACTITIONER

## 2023-11-16 PROCEDURE — 6360000004 HC RX CONTRAST MEDICATION: Performed by: INTERNAL MEDICINE

## 2023-11-16 PROCEDURE — 6370000000 HC RX 637 (ALT 250 FOR IP)

## 2023-11-16 PROCEDURE — 36415 COLL VENOUS BLD VENIPUNCTURE: CPT

## 2023-11-16 PROCEDURE — 71045 X-RAY EXAM CHEST 1 VIEW: CPT

## 2023-11-16 RX ORDER — BENZONATATE 200 MG/1
200 CAPSULE ORAL 3 TIMES DAILY
Qty: 21 CAPSULE | Refills: 0 | Status: SHIPPED | OUTPATIENT
Start: 2023-11-16 | End: 2023-11-23

## 2023-11-16 RX ORDER — DOXYCYCLINE HYCLATE 100 MG/1
100 CAPSULE ORAL EVERY 12 HOURS SCHEDULED
Status: DISCONTINUED | OUTPATIENT
Start: 2023-11-16 | End: 2023-11-17 | Stop reason: HOSPADM

## 2023-11-16 RX ORDER — DOXYCYCLINE HYCLATE 100 MG/1
100 CAPSULE ORAL EVERY 12 HOURS SCHEDULED
Qty: 20 CAPSULE | Refills: 0 | Status: SHIPPED | OUTPATIENT
Start: 2023-11-16 | End: 2023-11-26

## 2023-11-16 RX ORDER — GUAIFENESIN 400 MG/1
400 TABLET ORAL 3 TIMES DAILY
Qty: 56 TABLET | Refills: 0 | Status: SHIPPED | OUTPATIENT
Start: 2023-11-16

## 2023-11-16 RX ORDER — FLUTICASONE FUROATE, UMECLIDINIUM BROMIDE AND VILANTEROL TRIFENATATE 100; 62.5; 25 UG/1; UG/1; UG/1
1 POWDER RESPIRATORY (INHALATION) DAILY
Qty: 1 EACH | Refills: 3 | Status: SHIPPED | OUTPATIENT
Start: 2023-11-16

## 2023-11-16 RX ADMIN — ARFORMOTEROL TARTRATE 15 MCG: 15 SOLUTION RESPIRATORY (INHALATION) at 19:21

## 2023-11-16 RX ADMIN — LISINOPRIL 5 MG: 5 TABLET ORAL at 08:51

## 2023-11-16 RX ADMIN — PERFLUTREN 1.5 ML: 6.52 INJECTION, SUSPENSION INTRAVENOUS at 14:15

## 2023-11-16 RX ADMIN — BUDESONIDE 500 MCG: 0.5 SUSPENSION RESPIRATORY (INHALATION) at 19:21

## 2023-11-16 RX ADMIN — SUCRALFATE 1 G: 1 TABLET ORAL at 20:30

## 2023-11-16 RX ADMIN — GUAIFENESIN 400 MG: 400 TABLET ORAL at 15:22

## 2023-11-16 RX ADMIN — PANTOPRAZOLE SODIUM 40 MG: 40 TABLET, DELAYED RELEASE ORAL at 05:26

## 2023-11-16 RX ADMIN — GUAIFENESIN 400 MG: 400 TABLET ORAL at 20:30

## 2023-11-16 RX ADMIN — DOXYCYCLINE HYCLATE 100 MG: 100 CAPSULE ORAL at 20:30

## 2023-11-16 RX ADMIN — BENZONATATE 200 MG: 100 CAPSULE ORAL at 15:21

## 2023-11-16 RX ADMIN — IPRATROPIUM BROMIDE AND ALBUTEROL SULFATE 1 DOSE: .5; 2.5 SOLUTION RESPIRATORY (INHALATION) at 12:12

## 2023-11-16 RX ADMIN — GUAIFENESIN 400 MG: 400 TABLET ORAL at 08:50

## 2023-11-16 RX ADMIN — SUCRALFATE 1 G: 1 TABLET ORAL at 18:41

## 2023-11-16 RX ADMIN — BUDESONIDE 500 MCG: 0.5 SUSPENSION RESPIRATORY (INHALATION) at 08:10

## 2023-11-16 RX ADMIN — DOXYCYCLINE HYCLATE 100 MG: 100 CAPSULE ORAL at 12:01

## 2023-11-16 RX ADMIN — ARFORMOTEROL TARTRATE 15 MCG: 15 SOLUTION RESPIRATORY (INHALATION) at 08:10

## 2023-11-16 RX ADMIN — SUCRALFATE 1 G: 1 TABLET ORAL at 12:01

## 2023-11-16 RX ADMIN — METOPROLOL TARTRATE 50 MG: 50 TABLET ORAL at 20:30

## 2023-11-16 RX ADMIN — IPRATROPIUM BROMIDE AND ALBUTEROL SULFATE 1 DOSE: .5; 2.5 SOLUTION RESPIRATORY (INHALATION) at 08:10

## 2023-11-16 RX ADMIN — IPRATROPIUM BROMIDE AND ALBUTEROL SULFATE 1 DOSE: .5; 2.5 SOLUTION RESPIRATORY (INHALATION) at 19:21

## 2023-11-16 RX ADMIN — BENZONATATE 200 MG: 100 CAPSULE ORAL at 20:30

## 2023-11-16 RX ADMIN — AMLODIPINE BESYLATE 5 MG: 5 TABLET ORAL at 08:51

## 2023-11-16 RX ADMIN — SUCRALFATE 1 G: 1 TABLET ORAL at 08:51

## 2023-11-16 RX ADMIN — ISOSORBIDE MONONITRATE 60 MG: 30 TABLET, EXTENDED RELEASE ORAL at 08:50

## 2023-11-16 RX ADMIN — ATORVASTATIN CALCIUM 40 MG: 40 TABLET, FILM COATED ORAL at 08:51

## 2023-11-16 RX ADMIN — METOPROLOL TARTRATE 50 MG: 50 TABLET ORAL at 08:50

## 2023-11-16 RX ADMIN — IPRATROPIUM BROMIDE AND ALBUTEROL SULFATE 1 DOSE: .5; 2.5 SOLUTION RESPIRATORY (INHALATION) at 15:35

## 2023-11-16 RX ADMIN — BENZONATATE 200 MG: 100 CAPSULE ORAL at 08:51

## 2023-11-16 NOTE — PROGRESS NOTES
PULSE OX ON ROOM AIR SITTING 90%   PULSE OX ON ROOM AIR AMBULATING 81% - pt stood at bedside and walked to restroom.    PULSE OX ON 3 LITERS AMBULATING RECOVERY 91%   PULSE OX ON 3 LITERS SITTING RECOVERY 96%

## 2023-11-16 NOTE — CARE COORDINATION
CM Update: Plan at discharge remains Home with no anticipated needs. Pt will need ambulating pulse ox. DME order placed for a nebulizer,left message for Belkis with [address] DME to follow.  Awaiting Echo and possible Stress test. CM will follow (TF)      Nicky Gonzáles  Case Management  [phone]

## 2023-11-17 VITALS
RESPIRATION RATE: 18 BRPM | HEIGHT: 72 IN | SYSTOLIC BLOOD PRESSURE: 148 MMHG | BODY MASS INDEX: 22.06 KG/M2 | OXYGEN SATURATION: 93 % | DIASTOLIC BLOOD PRESSURE: 58 MMHG | TEMPERATURE: 97.9 F | HEART RATE: 77 BPM | WEIGHT: 162.9 LBS

## 2023-11-17 LAB
ALBUMIN SERPL-MCNC: 2.9 G/DL (ref 3.5–5.2)
ALP SERPL-CCNC: 133 U/L (ref 40–129)
ALT SERPL-CCNC: 22 U/L (ref 0–40)
ANION GAP SERPL CALCULATED.3IONS-SCNC: 15 MMOL/L (ref 7–16)
ANION GAP SERPL CALCULATED.3IONS-SCNC: 19 MMOL/L (ref 7–16)
AST SERPL-CCNC: 20 U/L (ref 0–39)
BASOPHILS # BLD: 0 K/UL (ref 0–0.2)
BASOPHILS NFR BLD: 0 % (ref 0–2)
BILIRUB SERPL-MCNC: 0.4 MG/DL (ref 0–1.2)
BUN SERPL-MCNC: 22 MG/DL (ref 6–23)
BUN SERPL-MCNC: 22 MG/DL (ref 6–23)
CALCIUM SERPL-MCNC: 8.5 MG/DL (ref 8.6–10.2)
CALCIUM SERPL-MCNC: 8.5 MG/DL (ref 8.6–10.2)
CHLORIDE SERPL-SCNC: 104 MMOL/L (ref 98–107)
CHLORIDE SERPL-SCNC: 105 MMOL/L (ref 98–107)
CO2 SERPL-SCNC: 22 MMOL/L (ref 22–29)
CO2 SERPL-SCNC: 23 MMOL/L (ref 22–29)
CREAT SERPL-MCNC: 1.3 MG/DL (ref 0.7–1.2)
CREAT SERPL-MCNC: 1.4 MG/DL (ref 0.7–1.2)
EOSINOPHIL # BLD: 0.63 K/UL (ref 0.05–0.5)
EOSINOPHILS RELATIVE PERCENT: 4 % (ref 0–6)
ERYTHROCYTE [DISTWIDTH] IN BLOOD BY AUTOMATED COUNT: 20.8 % (ref 11.5–15)
GFR SERPL CREATININE-BSD FRML MDRD: 52 ML/MIN/1.73M2
GFR SERPL CREATININE-BSD FRML MDRD: 55 ML/MIN/1.73M2
GLUCOSE SERPL-MCNC: 89 MG/DL (ref 74–99)
GLUCOSE SERPL-MCNC: 92 MG/DL (ref 74–99)
HCT VFR BLD AUTO: 29.7 % (ref 37–54)
HGB BLD-MCNC: 8.7 G/DL (ref 12.5–16.5)
LYMPHOCYTES NFR BLD: 1.26 K/UL (ref 1.5–4)
LYMPHOCYTES RELATIVE PERCENT: 9 % (ref 20–42)
MCH RBC QN AUTO: 21.6 PG (ref 26–35)
MCHC RBC AUTO-ENTMCNC: 29.3 G/DL (ref 32–34.5)
MCV RBC AUTO: 73.9 FL (ref 80–99.9)
MONOCYTES NFR BLD: 0.25 K/UL (ref 0.1–0.95)
MONOCYTES NFR BLD: 2 % (ref 2–12)
NEUTROPHILS NFR BLD: 85 % (ref 43–80)
NEUTS SEG NFR BLD: 12.25 K/UL (ref 1.8–7.3)
PLATELET # BLD AUTO: 523 K/UL (ref 130–450)
PMV BLD AUTO: 9.7 FL (ref 7–12)
POTASSIUM SERPL-SCNC: 4.1 MMOL/L (ref 3.5–5)
POTASSIUM SERPL-SCNC: 4.3 MMOL/L (ref 3.5–5)
PROT SERPL-MCNC: 7.2 G/DL (ref 6.4–8.3)
RBC # BLD AUTO: 4.02 M/UL (ref 3.8–5.8)
RBC # BLD: ABNORMAL 10*6/UL
SODIUM SERPL-SCNC: 142 MMOL/L (ref 132–146)
SODIUM SERPL-SCNC: 146 MMOL/L (ref 132–146)
WBC OTHER # BLD: 14.4 K/UL (ref 4.5–11.5)

## 2023-11-17 PROCEDURE — 6370000000 HC RX 637 (ALT 250 FOR IP): Performed by: NURSE PRACTITIONER

## 2023-11-17 PROCEDURE — 6370000000 HC RX 637 (ALT 250 FOR IP): Performed by: INTERNAL MEDICINE

## 2023-11-17 PROCEDURE — 80053 COMPREHEN METABOLIC PANEL: CPT

## 2023-11-17 PROCEDURE — 6360000002 HC RX W HCPCS: Performed by: NURSE PRACTITIONER

## 2023-11-17 PROCEDURE — 94640 AIRWAY INHALATION TREATMENT: CPT

## 2023-11-17 PROCEDURE — 99232 SBSQ HOSP IP/OBS MODERATE 35: CPT | Performed by: INTERNAL MEDICINE

## 2023-11-17 PROCEDURE — 36415 COLL VENOUS BLD VENIPUNCTURE: CPT

## 2023-11-17 PROCEDURE — 6370000000 HC RX 637 (ALT 250 FOR IP): Performed by: EMERGENCY MEDICINE

## 2023-11-17 PROCEDURE — 2700000000 HC OXYGEN THERAPY PER DAY

## 2023-11-17 PROCEDURE — 80048 BASIC METABOLIC PNL TOTAL CA: CPT

## 2023-11-17 PROCEDURE — 6370000000 HC RX 637 (ALT 250 FOR IP)

## 2023-11-17 PROCEDURE — 85025 COMPLETE CBC W/AUTO DIFF WBC: CPT

## 2023-11-17 RX ORDER — METOPROLOL SUCCINATE 50 MG/1
50 TABLET, EXTENDED RELEASE ORAL 2 TIMES DAILY
Qty: 30 TABLET | Refills: 3 | Status: SHIPPED | OUTPATIENT
Start: 2023-11-17

## 2023-11-17 RX ORDER — METOPROLOL SUCCINATE 50 MG/1
50 TABLET, EXTENDED RELEASE ORAL 2 TIMES DAILY
Status: DISCONTINUED | OUTPATIENT
Start: 2023-11-17 | End: 2023-11-17 | Stop reason: HOSPADM

## 2023-11-17 RX ADMIN — BENZONATATE 200 MG: 100 CAPSULE ORAL at 08:36

## 2023-11-17 RX ADMIN — IPRATROPIUM BROMIDE AND ALBUTEROL SULFATE 1 DOSE: .5; 2.5 SOLUTION RESPIRATORY (INHALATION) at 11:14

## 2023-11-17 RX ADMIN — ATORVASTATIN CALCIUM 40 MG: 40 TABLET, FILM COATED ORAL at 08:36

## 2023-11-17 RX ADMIN — PANTOPRAZOLE SODIUM 40 MG: 40 TABLET, DELAYED RELEASE ORAL at 06:16

## 2023-11-17 RX ADMIN — AMLODIPINE BESYLATE 5 MG: 5 TABLET ORAL at 08:35

## 2023-11-17 RX ADMIN — BENZONATATE 200 MG: 100 CAPSULE ORAL at 14:31

## 2023-11-17 RX ADMIN — IPRATROPIUM BROMIDE AND ALBUTEROL SULFATE 1 DOSE: .5; 2.5 SOLUTION RESPIRATORY (INHALATION) at 07:40

## 2023-11-17 RX ADMIN — GUAIFENESIN 400 MG: 400 TABLET ORAL at 14:31

## 2023-11-17 RX ADMIN — SUCRALFATE 1 G: 1 TABLET ORAL at 08:36

## 2023-11-17 RX ADMIN — ARFORMOTEROL TARTRATE 15 MCG: 15 SOLUTION RESPIRATORY (INHALATION) at 07:40

## 2023-11-17 RX ADMIN — DOXYCYCLINE HYCLATE 100 MG: 100 CAPSULE ORAL at 08:36

## 2023-11-17 RX ADMIN — ISOSORBIDE MONONITRATE 60 MG: 30 TABLET, EXTENDED RELEASE ORAL at 08:36

## 2023-11-17 RX ADMIN — GUAIFENESIN 400 MG: 400 TABLET ORAL at 08:34

## 2023-11-17 RX ADMIN — SUCRALFATE 1 G: 1 TABLET ORAL at 12:22

## 2023-11-17 RX ADMIN — BUDESONIDE 500 MCG: 0.5 SUSPENSION RESPIRATORY (INHALATION) at 07:40

## 2023-11-17 RX ADMIN — LISINOPRIL 5 MG: 5 TABLET ORAL at 08:36

## 2023-11-17 RX ADMIN — METOPROLOL SUCCINATE 50 MG: 50 TABLET, EXTENDED RELEASE ORAL at 08:35

## 2023-11-17 NOTE — DISCHARGE SUMMARY
[facility]   Discharge summary   Patient ID:  Kevyn Felix  [id number]  81 y.o.  1940    Admit date: 11/13/2023    Discharge date and time: 11/17/2023    Admission Diagnoses:   Patient Active Problem List   Diagnosis    HCAP (healthcare-associated pneumonia)    Melena    Rectal bleeding    Acute blood loss anemia    Duodenal ulcer    Aortic valve disease    Chronic obstructive pulmonary disease (HCC)    Essential hypertension    Coronary artery disease involving native coronary artery of native heart without angina pectoris    Pure hypercholesterolemia    Acute respiratory failure (HCC)    Acute hypoxic respiratory failure (HCC)    Acute anemia    Generalized edema    SOB (shortness of breath)       Discharge Diagnoses: Acute respiratory failure secondary to fluid overload, copd exacerbation and PNA    Consults: cardiology and pulmonary/intensive care    Procedures: none    Hospital Course:   Patient is an 81-year-old male admitted to [facility] for  Acute respiratory failure with hypoxia secondary to COPD exacerbation as well as fluid overload  -Monitor labs  -Check labs in the a.m.  -IV Levaquin DC'd by pulm and started on p.o. Doxy  -Chest x-ray pending  -IV Lasix 40 mg twice daily completed  -Recent proBNP 2, 264  -Mild bump in kidney function 24/1.4  -Improving leukocytosis 14.5  -Stable H&H 9.0/32.8  -No further needs from general surgery  -Currently remains on 3 L supplemental O2 satting 94 to 95% which is not his baseline  Awaiting completion of echocardiogram    11/17/23:  -Home Lopressor switched to metoprolol XL per cardiology recommendations  -Patient will require supplemental O2 on discharge  -Patient is to follow-up with pulmonology outpatient for management of his COPD  -Follow-up with Dr. Merry Maharaj within 1 week of discharge.     Recent Labs     11/15/23  0549 11/16/23  0636 11/17/23  0608   WBC 15.8* 14.5* 14.4*   HGB 7.7* 9.0* 8.7*   HCT 26.3* 32.8* 29.7*   * 472* 523*

## 2023-11-17 NOTE — H&P
[address], [address] [place] [address]                              HISTORY AND PHYSICAL    PATIENT NAME: Montrell Duron                      :        1940  MED REC NO:   [id number]                            ROOM:       80  ACCOUNT NO:   [de-identified]                           ADMIT DATE: 2023  PROVIDER:     Seth Leonardo MD    ADMISSION HISTORY AND PHYSICAL    CHIEF COMPLAINT:  Generalized weakness, shortness of breath, and redness  with pain along the left lower leg. HISTORY OF PRESENT ILLNESS:  The patient is an 67-year-old gentleman  with history of poor medical compliance with office followups who has  severe COPD with respiratory failure, history of coronary artery  disease, status post PCI, hypertension, hyperlipidemia who was in my  office on 2023 after a one and one-half year absence. During that  time frame, he had hemoglobin of 9.5 g percent, and hematocrit of 32.7. He presents to the ER at this time with his blood count in the 7.3 g  percent. He had been given a stool Hemoccult as outpatient but he never  followed through that. He was also in respiratory failure in ED and he  had what appeared to be a mild case of bacterial cellulitis along the  anterior aspect of his left lower extremity. There was also questions  of some pneumonic infiltrates as well. MEDICATIONS:  He takes amlodipine 5 mg daily, rosuvastatin 10 mg daily,  lisinopril 10 mg daily. He had stopped his Protonix 40 several months  ago. I think he still takes Imdur 60 mg daily and Trelegy plus  albuterol HFA. He has been lost to follow up with Pulmonary Medicine as  well. Dr. Hayley Waggoner has actually reached out to me through my office to  try to question me about how come he has not been following up with as  they indicated. REVIEW OF SYSTEMS:  NEUROMUSCULAR:  Admits to generalized weakness.   RESPIRATORY:  Shortness of breath,
doxycycline hyclate 100 MG capsule  Commonly known as: VIBRAMYCIN  Take 1 capsule by mouth every 12 hours for 10 days     guaiFENesin 400 MG tablet  Take 1 tablet by mouth 3 times daily     metoprolol succinate 50 MG extended release tablet  Commonly known as: TOPROL XL  Take 1 tablet by mouth in the morning and at bedtime            CHANGE how you take these medications      Trelegy Ellipta 100-62.5-25 MCG/ACT Aepb inhaler  Generic drug: fluticasone-umeclidin-vilant  Inhale 1 puff into the lungs daily  What changed:   medication strength  See the new instructions. CONTINUE taking these medications      * albuterol (2.5 MG/3ML) 0.083% nebulizer solution  Commonly known as: PROVENTIL     * albuterol sulfate  (90 Base) MCG/ACT inhaler  Commonly known as: ProAir HFA  Inhale 2 puffs into the lungs every 6 hours as needed for Wheezing     amLODIPine 5 MG tablet  Commonly known as: NORVASC     clotrimazole-betamethasone 1-0.05 % cream  Commonly known as: LOTRISONE     isosorbide mononitrate 60 MG extended release tablet  Commonly known as: IMDUR     lisinopril 5 MG tablet  Commonly known as: PRINIVIL;ZESTRIL     pantoprazole 40 MG tablet  Commonly known as: Protonix  Take 1 tablet by mouth daily     simvastatin 80 MG tablet  Commonly known as: ZOCOR           * This list has 2 medication(s) that are the same as other medications prescribed for you. Read the directions carefully, and ask your doctor or other care provider to review them with you.                 STOP taking these medications      metoprolol tartrate 50 MG tablet  Commonly known as: LOPRESSOR     nitroGLYCERIN 0.4 MG SL tablet  Commonly known as: NITROSTAT               Where to Get Your Medications        These medications were sent to [address] [place] [address], [address] [place] [address]      Phone: [phone]   metoprolol succinate 50 MG extended release

## 2023-11-17 NOTE — PROGRESS NOTES
CLINICAL PHARMACY NOTE: MEDS TO BEDS    Total # of Prescriptions Filled: 1   The following medications were delivered to the patient:  Metoprolol succ er 50mg    Additional Documentation:  Delivered to patient 11-17-23 @2:47pm

## 2023-11-17 NOTE — PLAN OF CARE
Problem: Discharge Planning  Goal: Discharge to home or other facility with appropriate resources  Outcome: Completed  Flowsheets (Taken 11/17/2023 0800 by Margoth Feldman RN)  Discharge to home or other facility with appropriate resources: Identify barriers to discharge with patient and caregiver     Problem: Skin/Tissue Integrity  Goal: Absence of new skin breakdown  Description: 1. Monitor for areas of redness and/or skin breakdown  2. Assess vascular access sites hourly  3. Every 4-6 hours minimum:  Change oxygen saturation probe site  4. Every 4-6 hours:  If on nasal continuous positive airway pressure, respiratory therapy assess nares and determine need for appliance change or resting period.   Outcome: Completed     Problem: Safety - Adult  Goal: Free from fall injury  Outcome: Completed  Flowsheets (Taken 11/17/2023 7085)  Free From Fall Injury: Instruct family/caregiver on patient safety     Problem: Pain  Goal: Verbalizes/displays adequate comfort level or baseline comfort level  Outcome: Completed

## 2023-11-17 NOTE — PROGRESS NOTES
Jefferson Sawant M.D.,[facility]  Tanisha Funes D.O., F.A.C.O.I., Cynthia Camacho M.D. Bessie Coffman M.D. Dr Jaden Maynard D.O. Daily Pulmonary Progress Note    Patient:  [place] 81 y.o. male MRN: [id number]     Date of Service: 11/17/2023      Synopsis     We are following patient for respiratory failure    \"CC\" shortness of breath    Code status: Full      Subjective      Patient was seen and examined. Sitting up at the side of the bed. Feeling better with breathing. Oxygen on at 3 L nasal cannula. Feeling improved with breathing. H&H has remained stable. Echo results reviewed. Preserved EF. Ox testing 11/16/23  PULSE OX ON ROOM AIR SITTING 90%   PULSE OX ON ROOM AIR AMBULATING 81% - pt stood at bedside and walked to restroom. PULSE OX ON 3 LITERS AMBULATING RECOVERY 91%   PULSE OX ON 3 LITERS SITTING RECOVERY 96%     Patient was evaluated today for the diagnosis of COPD. I entered a DME order for home oxygen at 3 lpm because the diagnosis and testing require the patient to have supplemental oxygen. Condition will improve or be benefited by oxygen use. The patient is not able to perform good mobility in a home setting and therefore does require the use of a portable oxygen system. The need for this equipment was discussed with the patient and he understands and is in agreement. Constitutional: Denies fever, weight loss, night sweats, and fatigue  Skin: Denies pigmentation, dark lesions, and rashes   HEENT: Denies hearing loss, tinnitus, ear drainage, epistaxis, sore throat, and hoarseness. Cardiovascular: Denies palpitations, chest pain, and chest pressure.   Respiratory: Cough and shortness of breath have improved  Gastrointestinal: Denies nausea, vomiting, poor appetite, diarrhea, heartburn or reflux  Genitourinary: Denies dysuria, frequency, urgency or hematuria  Musculoskeletal: Denies myalgias, muscle weakness, and bone pain bilateral lower extremity edema gastric/duodenal artery 2018 massive GI bleed      Plan:   Oxygen therapy 3 liters wean to keep > 92%. Oxygen for dc 3 liters, 24/7 for dc. Orders placed   Based on respiratory culture sensitivities we will switch his antibiotic to doxycycline orders have been placed  Scheduled bronchodilators -duonebs QID. Continue brovana and budesonide bid. Resume trelegy for dc  Continue guaifenesin, tessalon  Continue flutter valve  Received 1 dose IV solumedrol 125 mg in ED. Hold off systemic steroids   Re order home neb machine  The diuretics have been stopped by cardiology. Follow echocardiogram results. US BLE negative for dvt  Local wound care  General surgery evaluation no intervention planned. H&H has been stable. Patient now on daily Protonix. DVT, GI prophylaxis - protonix. Not able to tolerate anticoagulation. Ok to DC from a pulmonary perspective. Follow up routed. This plan of care was reviewed in collaboration with Dr Denny Ch  Electronically signed by ANGELIQUE Mock CNP on 11/17/2023 at 12:48 PM          I personally saw, examined and provided care for the patient. Radiographs, labs and medication list were reviewed by me independently. I spoke with bedside nursing, therapists and consultants. The case was discussed in detail and plans for care were established. Review of CNP documentation was conducted and revisions were made as appropriate. I agree with the above documented exam, problem list and plan of care.    Catie Hutchinson MD

## 2023-11-17 NOTE — CARE COORDINATION
Discharge order noted. Pt returning home. DME orders for O2 and nebulizer called to hospitals with [address] DME. Will deliver portable O2 to bedside.  (TF)      Logan Duong  Case Management  [phone]

## 2023-11-17 NOTE — CARE COORDINATION
CM Update: Plan at discharge remains Home with no anticipated needs. Pt will need ambulating pulse ox. DME order placed for a nebulizer,left message for Belkis [facility] DME to follow.  (TF)      Caren Gillis Crew  Case Management  [phone]

## 2023-11-18 LAB
MICROORGANISM SPEC CULT: NORMAL
SERVICE CMNT-IMP: NORMAL
SPECIMEN DESCRIPTION: NORMAL

## 2023-11-21 NOTE — PROGRESS NOTES
Physician Progress Note      Carla Coombs  CSN #:                  [id number]  :                       1940  ADMIT DATE:       2023 10:14 AM  DISCH DATE:        2023 4:30 PM  RESPONDING  PROVIDER #:        Danny Ray          QUERY TEXT:    Pt admitted with Pneumonia and Acute hypoxic respiratory failure. Pt noted on   admission to have WBC 17-18.1, RR 20-37, SpO2 84% RA,  and Respiratory   culture positive for Staphylococcus aureus. If possible, please document in   the progress notes and discharge summary if you are evaluating and /or   treating any of the following: The medical record reflects the following:  Risk Factors: Pneumonia, Acute respiratory failure with hypoxia  Clinical Indicators:  WBC 17-18.1, RR 20-37, SpO2 84% RA,  and   Respiratory culture positive for Staphylococcus aureus. Treatment: ***    Thank you,  Edmund Fernandes   Clinical Documentation Improvement Specialist  W: [phone]  Options provided:  -- Sepsis due to Pneumoia secondary to Staphylococcus aureus, present on   admission  -- Pneumonia due to Staphylococcus aureus without Sepsis  -- Other - I will add my own diagnosis  -- Disagree - Not applicable / Not valid  -- Disagree - Clinically unable to determine / Unknown  -- Refer to Clinical Documentation Reviewer    PROVIDER RESPONSE TEXT:    This patient has sepsis due to Pneumoia secondary to Staphylococcus aureus   which was present on admission.     Query created by: Edmund Fernandes on 2023 1:03 PM      Electronically signed by:  Danny Ray 2023 12:51 PM

## 2023-12-06 ENCOUNTER — HOSPITAL ENCOUNTER (OUTPATIENT)
Dept: OTHER | Age: 83
Setting detail: THERAPIES SERIES
Discharge: HOME | End: 2023-12-06

## 2023-12-06 NOTE — PROGRESS NOTES
Patient a no show for today's appointment. Called wife Janet's number and left voicemail requesting a call back top reschedule.

## 2023-12-06 NOTE — PLAN OF CARE
Problem: Chronic Conditions and Co-morbidities  Goal: Patient's chronic conditions and co-morbidity symptoms are monitored and maintained or improved  Flowsheets (Taken 12/6/2023 1920)  Care Plan - Patient's Chronic Conditions and Co-Morbidity Symptoms are Monitored and Maintained or Improved: Monitor and assess patient's chronic conditions and comorbid symptoms for stability, deterioration, or improvement

## 2024-01-05 ENCOUNTER — HOSPITAL ENCOUNTER (OUTPATIENT)
Dept: OTHER | Age: 84
Setting detail: THERAPIES SERIES
Discharge: HOME | End: 2024-01-05

## 2024-01-29 PROBLEM — I25.9 ASYMPTOMATIC CORONARY HEART DISEASE: Status: ACTIVE | Noted: 2019-08-22

## 2024-01-29 PROBLEM — E78.5 OTHER AND UNSPECIFIED HYPERLIPIDEMIA: Status: ACTIVE | Noted: 2024-01-29

## 2024-01-29 PROBLEM — I25.9 CHRONIC ISCHEMIC HEART DISEASE: Status: ACTIVE | Noted: 2024-01-29

## 2024-02-06 NOTE — CARE COORDINATION
3/2/2018 SOCIAL WORK TRANSITION OF CARE/DISCHARGE PLANNING  ASSESSMENT COMPLETED WITH SPOUSE VIA PHONE,PATIENT ON VENT. PATIENT IS FROM HOME WITH SPOUSE AND HAD BEEN INDEPENDENT,PER SPOUSE. PATIENT,REPORTEDLY, HAS HX THERAPY IN [facility], THEN ABOUT 1 WEEK OF [facility]. PATIENT PCP IS DR. Su Martinez AND PHARMACY IS [facility] IN [facility]. EXPLAINED  ROLE IN TRANSITION OF CARE/DISCHARGE PLANNING. WILL AWAIT FURTHER EVALS FOR FURTHER DISCHARGE PLANNING. SW WILL FOLLOW AND ASSIST PRN.  WILL LEAVE A LIST OF SUB ACUTE REHAB CHOICES,IF NEEDED.   Electronically signed by MYLENE Penny on 3/2/2018 at 2:47 PM Patient Quality Outreach    Patient is due for the following:   Asthma  -  ACT needed and Asthma follow-up visit    Next Steps:   Patient declined follow up at this time.    Type of outreach:    Phone, spoke to patient/parent.        Questions for provider review:    None           Karena Leyva

## 2024-04-11 ENCOUNTER — HOSPITAL ENCOUNTER (INPATIENT)
Age: 84
LOS: 9 days | End: 2024-04-20
Attending: EMERGENCY MEDICINE | Admitting: INTERNAL MEDICINE
Payer: MEDICARE

## 2024-04-11 ENCOUNTER — APPOINTMENT (OUTPATIENT)
Dept: GENERAL RADIOLOGY | Age: 84
End: 2024-04-11
Payer: MEDICARE

## 2024-04-11 DIAGNOSIS — J96.21 ACUTE ON CHRONIC RESPIRATORY FAILURE WITH HYPOXIA (HCC): ICD-10-CM

## 2024-04-11 DIAGNOSIS — J18.9 PNEUMONIA DUE TO INFECTIOUS ORGANISM, UNSPECIFIED LATERALITY, UNSPECIFIED PART OF LUNG: ICD-10-CM

## 2024-04-11 DIAGNOSIS — R09.02 HYPOXIA: Primary | ICD-10-CM

## 2024-04-11 PROBLEM — J96.20 ACUTE ON CHRONIC RESPIRATORY FAILURE (HCC): Status: ACTIVE | Noted: 2024-04-11

## 2024-04-11 LAB
ALBUMIN SERPL-MCNC: 3.3 G/DL (ref 3.5–5.2)
ALP SERPL-CCNC: 255 U/L (ref 40–129)
ALT SERPL-CCNC: 19 U/L (ref 0–40)
AMMONIA PLAS-SCNC: 23 UMOL/L (ref 16–60)
ANION GAP SERPL CALCULATED.3IONS-SCNC: 12 MMOL/L (ref 7–16)
ANION GAP SERPL CALCULATED.3IONS-SCNC: 12 MMOL/L (ref 7–16)
AST SERPL-CCNC: 38 U/L (ref 0–39)
B.E.: -7.6 MMOL/L (ref -3–3)
BASOPHILS # BLD: 0 K/UL (ref 0–0.2)
BASOPHILS # BLD: 0.04 K/UL (ref 0–0.2)
BASOPHILS NFR BLD: 0 % (ref 0–2)
BASOPHILS NFR BLD: 0 % (ref 0–2)
BILIRUB SERPL-MCNC: 0.6 MG/DL (ref 0–1.2)
BNP SERPL-MCNC: 5441 PG/ML (ref 0–450)
BUN SERPL-MCNC: 42 MG/DL (ref 6–23)
BUN SERPL-MCNC: 42 MG/DL (ref 6–23)
CALCIUM SERPL-MCNC: 8.5 MG/DL (ref 8.6–10.2)
CALCIUM SERPL-MCNC: 8.7 MG/DL (ref 8.6–10.2)
CHLORIDE SERPL-SCNC: 105 MMOL/L (ref 98–107)
CHLORIDE SERPL-SCNC: 105 MMOL/L (ref 98–107)
CO2 SERPL-SCNC: 23 MMOL/L (ref 22–29)
CO2 SERPL-SCNC: 25 MMOL/L (ref 22–29)
COHB: 1.2 % (ref 0–1.5)
CREAT SERPL-MCNC: 1.9 MG/DL (ref 0.7–1.2)
CREAT SERPL-MCNC: 1.9 MG/DL (ref 0.7–1.2)
CRITICAL: ABNORMAL
DATE ANALYZED: ABNORMAL
DATE OF COLLECTION: ABNORMAL
EOSINOPHIL # BLD: 0 K/UL (ref 0.05–0.5)
EOSINOPHIL # BLD: 0.02 K/UL (ref 0.05–0.5)
EOSINOPHILS RELATIVE PERCENT: 0 % (ref 0–6)
EOSINOPHILS RELATIVE PERCENT: 0 % (ref 0–6)
ERYTHROCYTE [DISTWIDTH] IN BLOOD BY AUTOMATED COUNT: 19.2 % (ref 11.5–15)
ERYTHROCYTE [DISTWIDTH] IN BLOOD BY AUTOMATED COUNT: 19.4 % (ref 11.5–15)
FLUAV RNA RESP QL NAA+PROBE: NOT DETECTED
FLUBV RNA RESP QL NAA+PROBE: NOT DETECTED
GFR SERPL CREATININE-BSD FRML MDRD: 35 ML/MIN/1.73M2
GFR SERPL CREATININE-BSD FRML MDRD: 35 ML/MIN/1.73M2
GLUCOSE BLD-MCNC: 132 MG/DL (ref 74–99)
GLUCOSE SERPL-MCNC: 106 MG/DL (ref 74–99)
GLUCOSE SERPL-MCNC: 123 MG/DL (ref 74–99)
HCO3: 22.9 MMOL/L (ref 22–26)
HCT VFR BLD AUTO: 31.8 % (ref 37–54)
HCT VFR BLD AUTO: 31.9 % (ref 37–54)
HGB BLD-MCNC: 8.2 G/DL (ref 12.5–16.5)
HGB BLD-MCNC: 9 G/DL (ref 12.5–16.5)
HHB: 13.9 % (ref 0–5)
IMM GRANULOCYTES # BLD AUTO: 0.26 K/UL (ref 0–0.58)
IMM GRANULOCYTES NFR BLD: 1 % (ref 0–5)
INR PPP: 1.4
LAB: ABNORMAL
LACTATE BLDV-SCNC: 0.9 MMOL/L (ref 0.5–1.9)
LACTATE BLDV-SCNC: 1 MMOL/L (ref 0.5–2.2)
LYMPHOCYTES NFR BLD: 0.9 K/UL (ref 1.5–4)
LYMPHOCYTES NFR BLD: 1.35 K/UL (ref 1.5–4)
LYMPHOCYTES RELATIVE PERCENT: 4 % (ref 20–42)
LYMPHOCYTES RELATIVE PERCENT: 6 % (ref 20–42)
Lab: 2210
MAGNESIUM SERPL-MCNC: 1.6 MG/DL (ref 1.6–2.6)
MAGNESIUM SERPL-MCNC: 1.7 MG/DL (ref 1.6–2.6)
MCH RBC QN AUTO: 20.4 PG (ref 26–35)
MCH RBC QN AUTO: 21.1 PG (ref 26–35)
MCHC RBC AUTO-ENTMCNC: 25.8 G/DL (ref 32–34.5)
MCHC RBC AUTO-ENTMCNC: 28.2 G/DL (ref 32–34.5)
MCV RBC AUTO: 74.7 FL (ref 80–99.9)
MCV RBC AUTO: 79.3 FL (ref 80–99.9)
METHB: 0.5 % (ref 0–1.5)
MODE: ABNORMAL
MONOCYTES NFR BLD: 0.9 K/UL (ref 0.1–0.95)
MONOCYTES NFR BLD: 1.54 K/UL (ref 0.1–0.95)
MONOCYTES NFR BLD: 4 % (ref 2–12)
MONOCYTES NFR BLD: 6 % (ref 2–12)
NEUTROPHILS NFR BLD: 87 % (ref 43–80)
NEUTROPHILS NFR BLD: 93 % (ref 43–80)
NEUTS SEG NFR BLD: 21.12 K/UL (ref 1.8–7.3)
NEUTS SEG NFR BLD: 24.19 K/UL (ref 1.8–7.3)
NUCLEATED RED BLOOD CELLS: 1 PER 100 WBC
O2 CONTENT: 11 ML/DL
O2 SATURATION: 85.9 % (ref 92–98.5)
O2HB: 84.4 % (ref 94–97)
OPERATOR ID: 2593
PATIENT TEMP: 37 C
PCO2: 80.5 MMHG (ref 35–45)
PH BLOOD GAS: 7.07 (ref 7.35–7.45)
PHOSPHATE SERPL-MCNC: 7.7 MG/DL (ref 2.5–4.5)
PLATELET # BLD AUTO: 415 K/UL (ref 130–450)
PLATELET # BLD AUTO: 505 K/UL (ref 130–450)
PMV BLD AUTO: 10.4 FL (ref 7–12)
PMV BLD AUTO: 9.8 FL (ref 7–12)
PO2: 66 MMHG (ref 75–100)
POTASSIUM SERPL-SCNC: 5.3 MMOL/L (ref 3.5–5)
POTASSIUM SERPL-SCNC: 5.6 MMOL/L (ref 3.5–5)
POTASSIUM SERPL-SCNC: 6.2 MMOL/L (ref 3.5–5)
PROT SERPL-MCNC: 8.6 G/DL (ref 6.4–8.3)
PROTHROMBIN TIME: 15.7 SEC (ref 9.3–12.4)
RBC # BLD AUTO: 4.01 M/UL (ref 3.8–5.8)
RBC # BLD AUTO: 4.27 M/UL (ref 3.8–5.8)
RBC # BLD: ABNORMAL 10*6/UL
SARS-COV-2 RNA RESP QL NAA+PROBE: NOT DETECTED
SODIUM SERPL-SCNC: 140 MMOL/L (ref 132–146)
SODIUM SERPL-SCNC: 142 MMOL/L (ref 132–146)
SOURCE, BLOOD GAS: ABNORMAL
SOURCE: NORMAL
SPECIMEN DESCRIPTION: NORMAL
THB: 9.2 G/DL (ref 11.5–16.5)
TIME ANALYZED: 2212
TROPONIN I SERPL HS-MCNC: 105 NG/L (ref 0–11)
WBC OTHER # BLD: 24.3 K/UL (ref 4.5–11.5)
WBC OTHER # BLD: 26 K/UL (ref 4.5–11.5)

## 2024-04-11 PROCEDURE — 84132 ASSAY OF SERUM POTASSIUM: CPT

## 2024-04-11 PROCEDURE — 84484 ASSAY OF TROPONIN QUANT: CPT

## 2024-04-11 PROCEDURE — 2580000003 HC RX 258: Performed by: EMERGENCY MEDICINE

## 2024-04-11 PROCEDURE — 83735 ASSAY OF MAGNESIUM: CPT

## 2024-04-11 PROCEDURE — 93005 ELECTROCARDIOGRAM TRACING: CPT | Performed by: EMERGENCY MEDICINE

## 2024-04-11 PROCEDURE — 87154 CUL TYP ID BLD PTHGN 6+ TRGT: CPT

## 2024-04-11 PROCEDURE — 87636 SARSCOV2 & INF A&B AMP PRB: CPT

## 2024-04-11 PROCEDURE — 82962 GLUCOSE BLOOD TEST: CPT

## 2024-04-11 PROCEDURE — 84100 ASSAY OF PHOSPHORUS: CPT

## 2024-04-11 PROCEDURE — 96365 THER/PROPH/DIAG IV INF INIT: CPT

## 2024-04-11 PROCEDURE — 2500000003 HC RX 250 WO HCPCS: Performed by: EMERGENCY MEDICINE

## 2024-04-11 PROCEDURE — 83880 ASSAY OF NATRIURETIC PEPTIDE: CPT

## 2024-04-11 PROCEDURE — 82140 ASSAY OF AMMONIA: CPT

## 2024-04-11 PROCEDURE — 87040 BLOOD CULTURE FOR BACTERIA: CPT

## 2024-04-11 PROCEDURE — 80048 BASIC METABOLIC PNL TOTAL CA: CPT

## 2024-04-11 PROCEDURE — 96375 TX/PRO/DX INJ NEW DRUG ADDON: CPT

## 2024-04-11 PROCEDURE — 6360000002 HC RX W HCPCS: Performed by: INTERNAL MEDICINE

## 2024-04-11 PROCEDURE — 83605 ASSAY OF LACTIC ACID: CPT

## 2024-04-11 PROCEDURE — 94660 CPAP INITIATION&MGMT: CPT

## 2024-04-11 PROCEDURE — 71045 X-RAY EXAM CHEST 1 VIEW: CPT

## 2024-04-11 PROCEDURE — 99285 EMERGENCY DEPT VISIT HI MDM: CPT

## 2024-04-11 PROCEDURE — 85610 PROTHROMBIN TIME: CPT

## 2024-04-11 PROCEDURE — 85025 COMPLETE CBC W/AUTO DIFF WBC: CPT

## 2024-04-11 PROCEDURE — 2140000000 HC CCU INTERMEDIATE R&B

## 2024-04-11 PROCEDURE — 82805 BLOOD GASES W/O2 SATURATION: CPT

## 2024-04-11 PROCEDURE — 80053 COMPREHEN METABOLIC PANEL: CPT

## 2024-04-11 PROCEDURE — 6360000002 HC RX W HCPCS: Performed by: EMERGENCY MEDICINE

## 2024-04-11 RX ORDER — ACETAMINOPHEN 325 MG/1
650 TABLET ORAL EVERY 6 HOURS PRN
Status: DISCONTINUED | OUTPATIENT
Start: 2024-04-11 | End: 2024-04-20

## 2024-04-11 RX ORDER — ARFORMOTEROL TARTRATE 15 UG/2ML
15 SOLUTION RESPIRATORY (INHALATION)
Status: DISCONTINUED | OUTPATIENT
Start: 2024-04-11 | End: 2024-04-13

## 2024-04-11 RX ORDER — BUMETANIDE 0.25 MG/ML
1 INJECTION INTRAMUSCULAR; INTRAVENOUS ONCE
Status: COMPLETED | OUTPATIENT
Start: 2024-04-11 | End: 2024-04-12

## 2024-04-11 RX ORDER — ISOSORBIDE MONONITRATE 30 MG/1
60 TABLET, EXTENDED RELEASE ORAL DAILY
Status: DISCONTINUED | OUTPATIENT
Start: 2024-04-11 | End: 2024-04-17

## 2024-04-11 RX ORDER — PANTOPRAZOLE SODIUM 40 MG/1
40 TABLET, DELAYED RELEASE ORAL DAILY
Status: DISCONTINUED | OUTPATIENT
Start: 2024-04-11 | End: 2024-04-13

## 2024-04-11 RX ORDER — ASPIRIN 81 MG/1
81 TABLET ORAL DAILY
Status: DISCONTINUED | OUTPATIENT
Start: 2024-04-11 | End: 2024-04-16

## 2024-04-11 RX ORDER — ENOXAPARIN SODIUM 100 MG/ML
40 INJECTION SUBCUTANEOUS DAILY
Status: DISCONTINUED | OUTPATIENT
Start: 2024-04-11 | End: 2024-04-16

## 2024-04-11 RX ORDER — ATORVASTATIN CALCIUM 40 MG/1
40 TABLET, FILM COATED ORAL DAILY
Status: DISCONTINUED | OUTPATIENT
Start: 2024-04-11 | End: 2024-04-20

## 2024-04-11 RX ORDER — 0.9 % SODIUM CHLORIDE 0.9 %
500 INTRAVENOUS SOLUTION INTRAVENOUS ONCE
Status: COMPLETED | OUTPATIENT
Start: 2024-04-11 | End: 2024-04-11

## 2024-04-11 RX ORDER — ALBUTEROL SULFATE 2.5 MG/3ML
2.5 SOLUTION RESPIRATORY (INHALATION) 4 TIMES DAILY PRN
Status: DISCONTINUED | OUTPATIENT
Start: 2024-04-11 | End: 2024-04-20

## 2024-04-11 RX ORDER — BUDESONIDE 0.25 MG/2ML
0.25 INHALANT ORAL
Status: DISCONTINUED | OUTPATIENT
Start: 2024-04-11 | End: 2024-04-20

## 2024-04-11 RX ORDER — METOPROLOL TARTRATE 50 MG/1
50 TABLET, FILM COATED ORAL 2 TIMES DAILY
Status: DISCONTINUED | OUTPATIENT
Start: 2024-04-11 | End: 2024-04-20

## 2024-04-11 RX ORDER — LISINOPRIL 10 MG/1
10 TABLET ORAL DAILY
Status: DISCONTINUED | OUTPATIENT
Start: 2024-04-11 | End: 2024-04-20

## 2024-04-11 RX ORDER — GUAIFENESIN 400 MG/1
400 TABLET ORAL 3 TIMES DAILY
Status: DISCONTINUED | OUTPATIENT
Start: 2024-04-11 | End: 2024-04-16

## 2024-04-11 RX ORDER — CALCIUM GLUCONATE 10 MG/ML
1000 INJECTION, SOLUTION INTRAVENOUS ONCE
Status: COMPLETED | OUTPATIENT
Start: 2024-04-11 | End: 2024-04-12

## 2024-04-11 RX ORDER — 0.9 % SODIUM CHLORIDE 0.9 %
1200 INTRAVENOUS SOLUTION INTRAVENOUS ONCE
Status: DISCONTINUED | OUTPATIENT
Start: 2024-04-11 | End: 2024-04-11

## 2024-04-11 RX ORDER — AMLODIPINE BESYLATE 5 MG/1
5 TABLET ORAL DAILY
Status: DISCONTINUED | OUTPATIENT
Start: 2024-04-11 | End: 2024-04-16

## 2024-04-11 RX ADMIN — DOXYCYCLINE 100 MG: 100 INJECTION, POWDER, LYOPHILIZED, FOR SOLUTION INTRAVENOUS at 16:24

## 2024-04-11 RX ADMIN — SODIUM CHLORIDE 500 ML: 9 INJECTION, SOLUTION INTRAVENOUS at 15:14

## 2024-04-11 RX ADMIN — SODIUM CHLORIDE 1200 ML: 9 INJECTION, SOLUTION INTRAVENOUS at 16:23

## 2024-04-11 RX ADMIN — WATER 1000 MG: 1 INJECTION INTRAMUSCULAR; INTRAVENOUS; SUBCUTANEOUS at 16:24

## 2024-04-11 RX ADMIN — ENOXAPARIN SODIUM 40 MG: 100 INJECTION SUBCUTANEOUS at 20:38

## 2024-04-11 RX ADMIN — SODIUM CHLORIDE 500 ML: 9 INJECTION, SOLUTION INTRAVENOUS at 16:49

## 2024-04-11 NOTE — PROGRESS NOTES
DVT Prophylaxis Adjustment Policy (DVT Prophylaxis)     This patient is on DVT Prophylaxis medication that requires a dose adjustment      Date Body Weight IBW  Adjusted BW SCr  CrCl Dialysis status   4/11/2024 74 kg (163 lb 2.3 oz) Ideal body weight: 77.6 kg (171 lb 1.2 oz) Serum creatinine: 1.9 mg/dL (H) 04/11/24 1507  Estimated creatinine clearance: 31 mL/min (A) N/a       Pharmacy has dose-adjusted the DVT Prophylaxis regimen to match   the recommendations from the following table        Ordered Medication:Lovenox 30mg daily    Order Changed/converted to: Lovenox 40mg daily      These changes were made per protocol according to the [facility] Pharmacist   Review for Appropriate Use and Automatic Dose Adjustments of   Subcutaneous Anticoagulants Policy     *Please note this dose may need readjusted if patient's condition changes.    Please contact pharmacy with any questions regarding these changes.    Lou Carrera RPH  4/11/2024  6:35 PM

## 2024-04-11 NOTE — ED PROVIDER NOTES
administration in time range)   acetaminophen (TYLENOL) tablet 650 mg (has no administration in time range)   cefTRIAXone (ROCEPHIN) 1,000 mg in sterile water 10 mL IV syringe (has no administration in time range)   doxycycline (VIBRAMYCIN) 100 mg in sodium chloride 0.9 % 100 mL IVPB (Wliq4Cmg) (has no administration in time range)   sodium chloride 0.9 % bolus 500 mL (0 mLs IntraVENous Stopped 4/11/24 1615)   cefTRIAXone (ROCEPHIN) 1,000 mg in sterile water 10 mL IV syringe (1,000 mg IntraVENous Given 4/11/24 1624)   doxycycline (VIBRAMYCIN) 100 mg in sodium chloride 0.9 % 100 mL IVPB (Nkfg2Oel) (0 mg IntraVENous Stopped 4/11/24 1730)   sodium chloride 0.9 % bolus 500 mL (0 mLs IntraVENous Stopped 4/11/24 1730)         ED COURSE:  ED Course as of 04/11/24 1924   Thu Apr 11, 2024   1600 Infxn identified, abx ordered [AT]      ED Course User Index  [AT] Kike Collazo MD             This patient's ED course included: a personal history and physicial examination    This patient has remained hemodynamically stable during their ED course.      Re-Evaluations:             Re-evaluation.  Patient’s symptoms are improving    Re-examination  4/11/24   2:39 PM EDT          Vital Signs:   Vitals:    04/11/24 1700 04/11/24 1730 04/11/24 1800 04/11/24 1830   BP: 135/67 129/63 137/66 135/67   Pulse: (!) 104 (!) 102 99 98   Resp: 25 25 25 25   Temp:   98.6 °F (37 °C)    TempSrc:   Axillary    SpO2: 96% 96% 100% 97%   Weight:       Height:               Prasad:             adri    Critical Care: 32 min excluding billable procedure               --------------------------------- IMPRESSION AND DISPOSITION ---------------------------------    IMPRESSION  1. Hypoxia    2. Pneumonia due to infectious organism, unspecified laterality, unspecified part of lung        DISPOSITION  Disposition: Admit to telemetry  Patient condition is stable    NOTE: This report was transcribed using voice recognition software. Every effort was  made to ensure accuracy; however, inadvertent computerized transcription errors may be present        Kike Collazo MD  04/11/24 1924

## 2024-04-12 LAB
AADO2: 172.5 MMHG
AADO2: 215 MMHG
AADO2: 254 MMHG
ALBUMIN SERPL-MCNC: 2.8 G/DL (ref 3.5–5.2)
ALP SERPL-CCNC: 198 U/L (ref 40–129)
ALT SERPL-CCNC: 17 U/L (ref 0–40)
ANION GAP SERPL CALCULATED.3IONS-SCNC: 14 MMOL/L (ref 7–16)
AST SERPL-CCNC: 28 U/L (ref 0–39)
B PARAP IS1001 DNA NPH QL NAA+NON-PROBE: NOT DETECTED
B PERT DNA SPEC QL NAA+PROBE: NOT DETECTED
B.E.: -4.1 MMOL/L (ref -3–3)
B.E.: -4.9 MMOL/L (ref -3–3)
B.E.: -8.4 MMOL/L (ref -3–3)
BASOPHILS # BLD: 0.03 K/UL (ref 0–0.2)
BASOPHILS NFR BLD: 0 % (ref 0–2)
BILIRUB SERPL-MCNC: 0.5 MG/DL (ref 0–1.2)
BILIRUB UR QL STRIP: NEGATIVE
BUN SERPL-MCNC: 46 MG/DL (ref 6–23)
C PNEUM DNA NPH QL NAA+NON-PROBE: NOT DETECTED
CALCIUM SERPL-MCNC: 8.5 MG/DL (ref 8.6–10.2)
CASTS #/AREA URNS LPF: ABNORMAL /LPF
CHLORIDE SERPL-SCNC: 105 MMOL/L (ref 98–107)
CLARITY UR: CLEAR
CO2 SERPL-SCNC: 21 MMOL/L (ref 22–29)
COHB: 0.8 % (ref 0–1.5)
COHB: 1 % (ref 0–1.5)
COHB: 1 % (ref 0–1.5)
COLOR UR: YELLOW
CREAT SERPL-MCNC: 2 MG/DL (ref 0.7–1.2)
CREAT UR-MCNC: 63.6 MG/DL (ref 40–278)
CRITICAL: ABNORMAL
CRP SERPL HS-MCNC: 299 MG/L (ref 0–5)
DATE ANALYZED: ABNORMAL
DATE OF COLLECTION: ABNORMAL
EKG ATRIAL RATE: 116 BPM
EKG P AXIS: 78 DEGREES
EKG P-R INTERVAL: 172 MS
EKG Q-T INTERVAL: 308 MS
EKG QRS DURATION: 100 MS
EKG QTC CALCULATION (BAZETT): 428 MS
EKG R AXIS: 44 DEGREES
EKG T AXIS: 81 DEGREES
EKG VENTRICULAR RATE: 116 BPM
EOSINOPHIL # BLD: 0 K/UL (ref 0.05–0.5)
EOSINOPHILS RELATIVE PERCENT: 0 % (ref 0–6)
ERYTHROCYTE [DISTWIDTH] IN BLOOD BY AUTOMATED COUNT: 19.4 % (ref 11.5–15)
FIO2: 50 %
FIO2: 65 %
FIO2: 80 %
FLUAV RNA NPH QL NAA+NON-PROBE: NOT DETECTED
FLUBV RNA NPH QL NAA+NON-PROBE: NOT DETECTED
GFR SERPL CREATININE-BSD FRML MDRD: 33 ML/MIN/1.73M2
GLUCOSE BLD-MCNC: 130 MG/DL (ref 74–99)
GLUCOSE BLD-MCNC: 140 MG/DL (ref 74–99)
GLUCOSE BLD-MCNC: 159 MG/DL (ref 74–99)
GLUCOSE BLD-MCNC: 196 MG/DL (ref 74–99)
GLUCOSE BLD-MCNC: 73 MG/DL (ref 74–99)
GLUCOSE BLD-MCNC: 78 MG/DL (ref 74–99)
GLUCOSE BLD-MCNC: 84 MG/DL (ref 74–99)
GLUCOSE BLD-MCNC: 95 MG/DL (ref 74–99)
GLUCOSE SERPL-MCNC: 127 MG/DL (ref 74–99)
GLUCOSE UR STRIP-MCNC: NEGATIVE MG/DL
HADV DNA NPH QL NAA+NON-PROBE: NOT DETECTED
HCO3: 21.3 MMOL/L (ref 22–26)
HCO3: 23.2 MMOL/L (ref 22–26)
HCO3: 23.7 MMOL/L (ref 22–26)
HCOV 229E RNA NPH QL NAA+NON-PROBE: NOT DETECTED
HCOV HKU1 RNA NPH QL NAA+NON-PROBE: NOT DETECTED
HCOV NL63 RNA NPH QL NAA+NON-PROBE: NOT DETECTED
HCOV OC43 RNA NPH QL NAA+NON-PROBE: NOT DETECTED
HCT VFR BLD AUTO: 31.5 % (ref 37–54)
HGB BLD-MCNC: 8.4 G/DL (ref 12.5–16.5)
HGB UR QL STRIP.AUTO: NEGATIVE
HHB: 0 % (ref 0–5)
HHB: 0.9 % (ref 0–5)
HHB: 1.7 % (ref 0–5)
HMPV RNA NPH QL NAA+NON-PROBE: NOT DETECTED
HPIV1 RNA NPH QL NAA+NON-PROBE: NOT DETECTED
HPIV2 RNA NPH QL NAA+NON-PROBE: NOT DETECTED
HPIV3 RNA NPH QL NAA+NON-PROBE: NOT DETECTED
HPIV4 RNA NPH QL NAA+NON-PROBE: NOT DETECTED
IMM GRANULOCYTES # BLD AUTO: 0.42 K/UL (ref 0–0.58)
IMM GRANULOCYTES NFR BLD: 2 % (ref 0–5)
KETONES UR STRIP-MCNC: NEGATIVE MG/DL
LAB: ABNORMAL
LACTATE BLDV-SCNC: 1.1 MMOL/L (ref 0.5–1.9)
LEUKOCYTE ESTERASE UR QL STRIP: ABNORMAL
LYMPHOCYTES NFR BLD: 1.6 K/UL (ref 1.5–4)
LYMPHOCYTES RELATIVE PERCENT: 7 % (ref 20–42)
Lab: 1418
Lab: 205
Lab: 719
M PNEUMO DNA NPH QL NAA+NON-PROBE: NOT DETECTED
MCH RBC QN AUTO: 20.8 PG (ref 26–35)
MCHC RBC AUTO-ENTMCNC: 26.7 G/DL (ref 32–34.5)
MCV RBC AUTO: 78.2 FL (ref 80–99.9)
METHB: 0.3 % (ref 0–1.5)
METHB: 0.4 % (ref 0–1.5)
METHB: 0.5 % (ref 0–1.5)
MODE: ABNORMAL
MONOCYTES NFR BLD: 1.45 K/UL (ref 0.1–0.95)
MONOCYTES NFR BLD: 6 % (ref 2–12)
NEUTROPHILS NFR BLD: 86 % (ref 43–80)
NEUTS SEG NFR BLD: 21.24 K/UL (ref 1.8–7.3)
NITRITE UR QL STRIP: NEGATIVE
O2 CONTENT: 12.3 ML/DL
O2 CONTENT: 13.1 ML/DL
O2 CONTENT: 13.5 ML/DL
O2 SATURATION: 100 % (ref 92–98.5)
O2 SATURATION: 98.3 % (ref 92–98.5)
O2 SATURATION: 99.1 % (ref 92–98.5)
O2HB: 97.2 % (ref 94–97)
O2HB: 97.6 % (ref 94–97)
O2HB: 98.6 % (ref 94–97)
OPERATOR ID: 2962
OPERATOR ID: 5323
OPERATOR ID: 5323
PATIENT TEMP: 37 C
PCO2: 58.6 MMHG (ref 35–45)
PCO2: 59.1 MMHG (ref 35–45)
PCO2: 69.7 MMHG (ref 35–45)
PEEP/CPAP: 6 CMH2O
PFO2: 2.24 MMHG/%
PFO2: 2.35 MMHG/%
PFO2: 3.53 MMHG/%
PH BLOOD GAS: 7.1 (ref 7.35–7.45)
PH BLOOD GAS: 7.21 (ref 7.35–7.45)
PH BLOOD GAS: 7.22 (ref 7.35–7.45)
PH UR STRIP: 5.5 [PH] (ref 5–9)
PIP: 15 CMH2O
PLATELET # BLD AUTO: 428 K/UL (ref 130–450)
PMV BLD AUTO: 9.7 FL (ref 7–12)
PO2: 117.5 MMHG (ref 75–100)
PO2: 145.7 MMHG (ref 75–100)
PO2: 282.2 MMHG (ref 75–100)
POTASSIUM SERPL-SCNC: 5.3 MMOL/L (ref 3.5–5)
PROCALCITONIN SERPL-MCNC: 2.27 NG/ML (ref 0–0.08)
PROT SERPL-MCNC: 7.8 G/DL (ref 6.4–8.3)
PROT UR STRIP-MCNC: ABNORMAL MG/DL
RBC # BLD AUTO: 4.03 M/UL (ref 3.8–5.8)
RBC # BLD: ABNORMAL 10*6/UL
RBC #/AREA URNS HPF: ABNORMAL /HPF
RI(T): 0.76
RI(T): 1.47
RI(T): 1.74
RR MECHANICAL: 16 B/MIN
RSV RNA NPH QL NAA+NON-PROBE: NOT DETECTED
RV+EV RNA NPH QL NAA+NON-PROBE: NOT DETECTED
SARS-COV-2 RNA NPH QL NAA+NON-PROBE: NOT DETECTED
SODIUM SERPL-SCNC: 140 MMOL/L (ref 132–146)
SODIUM UR-SCNC: 29 MMOL/L
SOURCE, BLOOD GAS: ABNORMAL
SP GR UR STRIP: 1.02 (ref 1–1.03)
SPECIMEN DESCRIPTION: NORMAL
THB: 8.7 G/DL (ref 11.5–16.5)
THB: 9.2 G/DL (ref 11.5–16.5)
THB: 9.4 G/DL (ref 11.5–16.5)
TIME ANALYZED: 1422
TIME ANALYZED: 212
TIME ANALYZED: 728
UROBILINOGEN UR STRIP-ACNC: 0.2 EU/DL (ref 0–1)
UUN UR-MCNC: 695 MG/DL (ref 800–1666)
VT MECHANICAL: 480 ML
VT MECHANICAL: 480 ML
WBC #/AREA URNS HPF: ABNORMAL /HPF
WBC OTHER # BLD: 24.7 K/UL (ref 4.5–11.5)

## 2024-04-12 PROCEDURE — 86140 C-REACTIVE PROTEIN: CPT

## 2024-04-12 PROCEDURE — 2580000003 HC RX 258: Performed by: INTERNAL MEDICINE

## 2024-04-12 PROCEDURE — 83605 ASSAY OF LACTIC ACID: CPT

## 2024-04-12 PROCEDURE — 82570 ASSAY OF URINE CREATININE: CPT

## 2024-04-12 PROCEDURE — 6360000002 HC RX W HCPCS: Performed by: CHIROPRACTOR

## 2024-04-12 PROCEDURE — 6360000002 HC RX W HCPCS: Performed by: INTERNAL MEDICINE

## 2024-04-12 PROCEDURE — 5A09358 ASSISTANCE WITH RESPIRATORY VENTILATION, LESS THAN 24 CONSECUTIVE HOURS, INTERMITTENT POSITIVE AIRWAY PRESSURE: ICD-10-PCS | Performed by: INTERNAL MEDICINE

## 2024-04-12 PROCEDURE — 84540 ASSAY OF URINE/UREA-N: CPT

## 2024-04-12 PROCEDURE — 84145 PROCALCITONIN (PCT): CPT

## 2024-04-12 PROCEDURE — 36600 WITHDRAWAL OF ARTERIAL BLOOD: CPT

## 2024-04-12 PROCEDURE — 81001 URINALYSIS AUTO W/SCOPE: CPT

## 2024-04-12 PROCEDURE — 2700000000 HC OXYGEN THERAPY PER DAY

## 2024-04-12 PROCEDURE — 93010 ELECTROCARDIOGRAM REPORT: CPT | Performed by: INTERNAL MEDICINE

## 2024-04-12 PROCEDURE — 2500000003 HC RX 250 WO HCPCS: Performed by: INTERNAL MEDICINE

## 2024-04-12 PROCEDURE — 80053 COMPREHEN METABOLIC PANEL: CPT

## 2024-04-12 PROCEDURE — 94660 CPAP INITIATION&MGMT: CPT

## 2024-04-12 PROCEDURE — 94640 AIRWAY INHALATION TREATMENT: CPT

## 2024-04-12 PROCEDURE — 36415 COLL VENOUS BLD VENIPUNCTURE: CPT

## 2024-04-12 PROCEDURE — 0202U NFCT DS 22 TRGT SARS-COV-2: CPT

## 2024-04-12 PROCEDURE — 87081 CULTURE SCREEN ONLY: CPT

## 2024-04-12 PROCEDURE — 51798 US URINE CAPACITY MEASURE: CPT

## 2024-04-12 PROCEDURE — 82962 GLUCOSE BLOOD TEST: CPT

## 2024-04-12 PROCEDURE — 6370000000 HC RX 637 (ALT 250 FOR IP): Performed by: CHIROPRACTOR

## 2024-04-12 PROCEDURE — 84300 ASSAY OF URINE SODIUM: CPT

## 2024-04-12 PROCEDURE — 85025 COMPLETE CBC W/AUTO DIFF WBC: CPT

## 2024-04-12 PROCEDURE — 2500000003 HC RX 250 WO HCPCS: Performed by: CHIROPRACTOR

## 2024-04-12 PROCEDURE — 2140000000 HC CCU INTERMEDIATE R&B

## 2024-04-12 PROCEDURE — 2580000003 HC RX 258: Performed by: CHIROPRACTOR

## 2024-04-12 PROCEDURE — 82805 BLOOD GASES W/O2 SATURATION: CPT

## 2024-04-12 PROCEDURE — 87449 NOS EACH ORGANISM AG IA: CPT

## 2024-04-12 RX ORDER — GLUCAGON 1 MG/ML
1 KIT INJECTION PRN
Status: DISCONTINUED | OUTPATIENT
Start: 2024-04-12 | End: 2024-04-20

## 2024-04-12 RX ORDER — DEXTROSE MONOHYDRATE 100 MG/ML
INJECTION, SOLUTION INTRAVENOUS CONTINUOUS PRN
Status: DISCONTINUED | OUTPATIENT
Start: 2024-04-12 | End: 2024-04-20

## 2024-04-12 RX ADMIN — ANTI-FUNGAL POWDER MICONAZOLE NITRATE TALC FREE: 1.42 POWDER TOPICAL at 21:49

## 2024-04-12 RX ADMIN — DOXYCYCLINE 100 MG: 100 INJECTION, POWDER, LYOPHILIZED, FOR SOLUTION INTRAVENOUS at 18:31

## 2024-04-12 RX ADMIN — ARFORMOTEROL TARTRATE 15 MCG: 15 SOLUTION RESPIRATORY (INHALATION) at 20:48

## 2024-04-12 RX ADMIN — BUDESONIDE 250 MCG: 0.25 INHALANT RESPIRATORY (INHALATION) at 08:42

## 2024-04-12 RX ADMIN — BUDESONIDE 250 MCG: 0.25 INHALANT RESPIRATORY (INHALATION) at 20:48

## 2024-04-12 RX ADMIN — BUMETANIDE 1 MG: 0.25 INJECTION INTRAMUSCULAR; INTRAVENOUS at 00:51

## 2024-04-12 RX ADMIN — IPRATROPIUM BROMIDE 0.5 MG: 0.5 SOLUTION RESPIRATORY (INHALATION) at 20:48

## 2024-04-12 RX ADMIN — IPRATROPIUM BROMIDE 0.5 MG: 0.5 SOLUTION RESPIRATORY (INHALATION) at 08:43

## 2024-04-12 RX ADMIN — CALCIUM GLUCONATE 1000 MG: 10 INJECTION, SOLUTION INTRAVENOUS at 00:58

## 2024-04-12 RX ADMIN — DEXTROSE MONOHYDRATE 250 ML: 100 INJECTION, SOLUTION INTRAVENOUS at 02:31

## 2024-04-12 RX ADMIN — CEFTRIAXONE SODIUM 1000 MG: 1 INJECTION, POWDER, FOR SOLUTION INTRAMUSCULAR; INTRAVENOUS at 16:22

## 2024-04-12 RX ADMIN — INSULIN HUMAN 10 UNITS: 100 INJECTION, SOLUTION PARENTERAL at 02:24

## 2024-04-12 RX ADMIN — IPRATROPIUM BROMIDE 0.5 MG: 0.5 SOLUTION RESPIRATORY (INHALATION) at 12:22

## 2024-04-12 RX ADMIN — ANTI-FUNGAL POWDER MICONAZOLE NITRATE TALC FREE: 1.42 POWDER TOPICAL at 09:54

## 2024-04-12 RX ADMIN — IPRATROPIUM BROMIDE 0.5 MG: 0.5 SOLUTION RESPIRATORY (INHALATION) at 16:22

## 2024-04-12 RX ADMIN — ENOXAPARIN SODIUM 40 MG: 100 INJECTION SUBCUTANEOUS at 09:54

## 2024-04-12 RX ADMIN — DOXYCYCLINE 100 MG: 100 INJECTION, POWDER, LYOPHILIZED, FOR SOLUTION INTRAVENOUS at 06:50

## 2024-04-12 RX ADMIN — ARFORMOTEROL TARTRATE 15 MCG: 15 SOLUTION RESPIRATORY (INHALATION) at 08:43

## 2024-04-12 NOTE — PROGRESS NOTES
Speech Language Pathology  NAME:  Virgilio Sanchez  :  1940  DATE: 2024  ROOM:  [facility]/6502-A    Pt unavailable at 0900 for Clinical Swallow Evaluation services due to:    Pt currently on BiPAP .   Will attempt tomorrow    Will re-attempt as appropriate. Thank you.

## 2024-04-12 NOTE — PROGRESS NOTES
04/12/24 1418   Oxygen Therapy/Pulse Ox   Blood Gas  Performed? Yes   $ABG $Arterial Puncture   Emre's Test #1 Pos   Site #1 Right Radial   Site Prepped #1 Yes   Number of Attempts #1 1   Pressure Held #1 Yes   Complications #1 None   Post-procedure #1 Standard   Specimen Status #1 To lab   How Tolerated? Tolerated well

## 2024-04-12 NOTE — PROGRESS NOTES
Knee Sprain: Care Instructions  Your Care Instructions    A knee sprain is one or more stretched, partly torn, or completely torn knee ligaments. Ligaments are bands of ropelike tissue that connect bone to bone and make the knee stable. The knee has four main ligaments. Knee sprains often happen because of a twisting or bending injury from sports such as skiing, basketball, soccer, or football. The knee turns one way while the lower or upper leg goes another way. A sprain also can happen when the knee is hit from the side or the front. If a knee ligament is slightly stretched, you will probably need only home treatment. You may need a splint or brace (immobilizer) for a partly torn ligament. A complete tear may need surgery. A minor knee sprain may take up to 6 weeks to heal, while a severe sprain may take months. Follow-up care is a key part of your treatment and safety. Be sure to make and go to all appointments, and call your doctor if you are having problems. It's also a good idea to know your test results and keep a list of the medicines you take. How can you care for yourself at home? · Follow instructions about how much weight you can put on your leg and how to walk with crutches. · Prop up your leg on a pillow when you ice it or anytime you sit or lie down for the next 3 days. Try to keep it above the level of your heart. This will help reduce swelling. · Put ice or a cold pack on your knee for 10 to 20 minutes at a time. Try to do this every 1 to 2 hours for the next 3 days (when you are awake) or until the swelling goes down. Put a thin cloth between the ice and your skin. Do not get the splint wet. · If you have an elastic bandage, make sure it is snug but not so tight that your leg is numb, tingles, or swells below the bandage. You can loosen the bandage if it is too tight. · Your doctor may recommend a brace (immobilizer) to support your knee while it heals. Wear it as directed.   · Ask RN called Dr. Terry Ortiz office for a new patient podiatry consult    your doctor if you can take an over-the-counter pain medicine, such as acetaminophen (Tylenol), ibuprofen (Advil, Motrin), or naproxen (Aleve). Be safe with medicines. Read and follow all instructions on the label. When should you call for help? Call 911 anytime you think you may need emergency care. For example, call if:  · You have sudden chest pain and shortness of breath, or you cough up blood. Call your doctor now or seek immediate medical care if:  · You have increased or severe pain. · You cannot move your toes or ankle. · Your foot is cool or pale or changes color. · You have tingling, weakness, or numbness in your foot or leg. · Your splint or brace feels too tight. · You are unable to straighten the knee, or the knee \"locks. \"  · You have signs of a blood clot in your leg, such as:  ¨ Pain in your calf, back of the knee, thigh, or groin. ¨ Redness and swelling in your leg. Watch closely for changes in your health, and be sure to contact your doctor if:  · Your pain is not getting better or is getting worse. Where can you learn more? Go to http://yulisa-willian.info/. Enter N406 in the search box to learn more about \"Knee Sprain: Care Instructions. \"  Current as of: May 23, 2016  Content Version: 11.1  © 9443-5172 [employer]. Care instructions adapted under license by [employer] (which disclaims liability or warranty for this information). If you have questions about a medical condition or this instruction, always ask your healthcare professional. [place] [address] any warranty or liability for your use of this information.

## 2024-04-12 NOTE — PROGRESS NOTES
RN unable to do full patient data base as patient arrived to unit unresponsive and wife is a poor historian

## 2024-04-12 NOTE — PROGRESS NOTES
Date: 4/11/2024    Time: 10:28 PM    Patient Placed On BIPAP/CPAP/ Non-Invasive Ventilation?  Yes    If no must comment.  Facial area red/color change? No           If YES are Blister/Lesion present?No   If yes must notify nursing staff  BIPAP/CPAP skin barrier?  Yes    Skin barrier type: placed on emergently during RRT        Comments:        Kassandra Leyva RCP

## 2024-04-12 NOTE — PROGRESS NOTES
Date: 4/12/2024    Time: 10:54 AM    Patient Placed On BIPAP/CPAP/ Non-Invasive Ventilation?  Yes    If no must comment.  Facial area red/color change? No           If YES are Blister/Lesion present?No   If yes must notify nursing staff  BIPAP/CPAP skin barrier?  Yes    Skin barrier type:mepilexlite        04/12/24 1053   NIV Type   NIV Started/Stopped On   Equipment Type V60   Mode AVAPS   Mask Type Full face mask   Mask Size Medium   Settings/Measurements   PIP Observed 16 cm H20   CPAP/EPAP 6 cmH2O   IPAP Min 16 cmH2O   IPAP Max 25 cmH2O   Vt (Set, mL) 480 mL   Vt (Measured) 635 mL   Rate Ordered 16   FiO2  (S)  55 %  (pt deaturated around 88% now saturating 91%)   Minute Volume (L/min) 14.6 Liters   Mask Leak (lpm) 80 lpm   Patient's Home Machine No   Alarm Settings   Alarms On Y             Ashley Snell RCP

## 2024-04-12 NOTE — FLOWSHEET NOTE
04/12/24 0235   Belongings   Dental Appliances Uppers;Lowers   Vision - Corrective Lenses None   Hearing Aid None   Clothing Shirt;Socks;Pants;Undergarments   Jewelry None   Electronic Devices None   Weapons (Notify Protective Services/Security) None   Home Medications None   Valuables Given To Patient   Provide Name(s) of Who Valuable(s) Were Given To self

## 2024-04-12 NOTE — PROGRESS NOTES
Date: 4/12/2024    Time: 1:27 AM    Patient Placed On BIPAP/CPAP/ Non-Invasive Ventilation?  Patient continues on bipap    If no must comment.  Facial area red/color change? No           If YES are Blister/Lesion present?No   If yes must notify nursing staff  BIPAP/CPAP skin barrier?  Yes    Skin barrier type:mepilexlite       Comments:        Vero Hogan RCP

## 2024-04-12 NOTE — PROGRESS NOTES
Difficult night   Presently on PAP  Overall improved  RRR  Markedly diminished breath sounds   Less peripheral edema  Continue present measures  Pulmonary to see

## 2024-04-12 NOTE — CARE COORDINATION
4/12/24  Transition of Care Update.  Spoke with patients wife and 2 step children regarding transition of care.  Patient noted to be non responsive in the room.  Wife is present but pleasantly confused.  Patient has consults to Podiatry and Pulmonary. Patient admitted for hypoxia, chronic respiratory failure and pneumonia.  Patient is on cont. PAP Fio2 55.  Patient  is pending a bilateral lower extremity US.  Patient is on IV Vanc and IV Rocephin.  PT/OT evals on hold until respiratory status improves. Speech is on for failed swallow eval. Spoke with wife and step children about a MARTI stay when patient is stable to discharge from the hospital and family agreeable.  Family was choiced with referral called to [facility] (formerly [facility]3] and pending. Patient was living at home with his wife in an apartment prior to admit.  Patient has a cane and a walker.  Patient has home 02 but family unable to remember the liter flow or provider.  PCP is Dr. Altamirano and pharmacy choice is [facility] in [place]. SW/Cm to follow.    Electronically signed by MYLENE Riley on 4/12/2024 at 11:48 AM     Case Management Assessment  Initial Evaluation    Date/Time of Evaluation: 4/12/2024 11:48 AM  Assessment Completed by: MYLENE Riley    If patient is discharged prior to next notation, then this note serves as note for discharge by case management.    Patient Name: Virgilio Sanchez                   YOB: 1940  Diagnosis: Hypoxia [R09.02]  Acute on chronic respiratory failure (HCC) [J96.20]  Pneumonia due to infectious organism [J18.9]  Pneumonia due to infectious organism, unspecified laterality, unspecified part of lung [J18.9]                   Date / Time: 4/11/2024  2:16 PM    Patient Admission Status: Inpatient   Readmission Risk (Low < 19, Mod (19-27), High > 27): Readmission Risk Score: 16.6    Current PCP: Jef Altamirano MD  PCP verified by CM? Yes    Chart Reviewed: Yes      History

## 2024-04-12 NOTE — CONSULTS
without any distress.  Breathing is not labored  HEENT: Pupils are equal round and reactive to light, there are no oral lesions and no post-nasal drip   Neck: supple without adenopathy  Cardiovascular: regular rate and rhythm without murmur or gallop  Respiratory: Clear to auscultation bilaterally without wheezing or crackles.  Air entry is symmetric  Abdomen: soft, non-tender, non-distended, normal bowel sounds  Extremities: warm, no edema, no clubbing  Skin: no rash or lesion  Neurologic: CN II-XII grossly intact, no focal deficits          Imaging personally reviewed:    Narrative & Impression  EXAMINATION:  ONE XRAY VIEW OF THE CHEST     4/11/2024 3:47 pm     COMPARISON:  11/16/2023 chest radiograph.     HISTORY:  ORDERING SYSTEM PROVIDED HISTORY: sob  TECHNOLOGIST PROVIDED HISTORY:  Reason for exam:->sob     FINDINGS:  The lungs are well expanded.  There is new airspace density left upper lobe  peripherally.  Cardiac size is normal.  There is atherosclerotic  calcification of the thoracic aorta.  There is moderate thoracic spondylosis.  There are chain sutures overlying upper abdomen.  No pneumothorax.     IMPRESSION:  New airspace density left upper lobe peripherally, likely pneumonia.  Follow-up to resolution recommended.  Infiltrate new when compared to the  previous study.  This may represent pneumonia although pulmonary infarct or  pleural based mass cannot be entirely excluded.  This finding could be  further evaluated with contrast-enhanced CT as clinically indicated.              Specimen Collected: 04/11/24 15:53 EDT Last Resulted: 04/11/24 15:54 EDT           Echo:      Labs:  Lab Results   Component Value Date/Time    WBC 24.7 04/12/2024 05:40 AM    RBC 4.03 04/12/2024 05:40 AM    HGB 8.4 04/12/2024 05:40 AM    HCT 31.5 04/12/2024 05:40 AM    MCV 78.2 04/12/2024 05:40 AM    MCH 20.8 04/12/2024 05:40 AM    MCHC 26.7 04/12/2024 05:40 AM    RDW 19.4 04/12/2024 05:40 AM     04/12/2024 05:40 AM     computerized transcription errors may be present

## 2024-04-12 NOTE — PATIENT CARE CONFERENCE
P Quality Flow/Interdisciplinary Rounds Progress Note        Quality Flow Rounds held on April 12, 2024    Disciplines Attending:  Bedside Nurse, , , and Nursing Unit Leadership    Virgilio Sanchez was admitted on 4/11/2024  2:16 PM    Anticipated Discharge Date:       Disposition:    Adryan Score:  Adryan Scale Score: 13    Readmission Risk              Risk of Unplanned Readmission:  20           Discussed patient goal for the day, patient clinical progression, and barriers to discharge.  The following Goal(s) of the Day/Commitment(s) have been identified:  report labs/diagnostics      Joleen Izaguirre RN  April 12, 2024

## 2024-04-12 NOTE — SIGNIFICANT EVENT
Rapid Response Team Note        Date of event: 4/11/2024   Time of event: 09:58  Virgilio Sanchez 83 y.o. year old male   YOB: 1940   Admit date:  4/11/2024   Location: [address] [place] [address]-   Witnessed? : [x]Yes  [] No  Monitored? : [x]Yes  [] No  Code status: [x] Full  [] DNR-CCA  []DNR-CC  ______________________________________________________________________  Reason for RRT:    [] RR < 8     [] RR > 28   [x] SpO2 <90%   [] HR < 40 bpm   [] HR > 130 bpm  [] SBP < 90 mmHg    [] SpO2 <90%   [] LOC   [] Seizures    [] Significant Bleeding Event    [] Other: AMS    Subjective:   Called to bedside regarding the above event, patient is a 83-year-old male presenting to the ED for shortness of breath and respiratory distress.  ED evaluation was concerning for pneumonia.  RRT was called for altered mental status right after transferring the patient to the floor.  On arrival patient was lethargic, responsive to painful stimuli, with occasional response to verbal stimuli.  He is not in respiratory distress and is not using the accessory muscles.     Objective:   Vital signs:   Vitals:    04/11/24 2000 04/11/24 2030 04/11/24 2100 04/11/24 2130   BP: 126/63 120/62 118/60 (!) 117/54   Pulse: 91 90 88 87   Resp: 25 25 25 25   Temp:       TempSrc:       SpO2: 98% 98% 96% 96%   Weight:       Height:         Initial Condition:  Conscious   [] Yes  [] No     Breathing [] Yes  [] No     Pulse  [] Yes  [] No    Physical Exam  Constitutional:       General: He is not in acute distress.     Appearance: He is ill-appearing.   HENT:      Head: Normocephalic and atraumatic.      Mouth/Throat:      Mouth: Mucous membranes are moist.   Cardiovascular:      Rate and Rhythm: Normal rate and regular rhythm.      Heart sounds: No murmur heard.     No friction rub. No gallop.   Pulmonary:      Effort: No respiratory distress.      Breath sounds: No stridor. Rhonchi present. No wheezing.   Abdominal:      General: There is no distension.      [] SICU    Patient’s family updated: [x] Yes  [] No     Patient is critical with high probability of clinical deterioration. ** minutes of critical care time spent. This time includes time spent at bedside interviewing and examining patient, coordinating care, review of records, discussing with patient and  family at bedside.    Due to the immediate potential for life-threatening deterioration due to AMS and hypoxia I spent 30 minutes providing critical care.  This time is excluding time spent performing procedures.    Case was discussed with Dr. Altamirano    Electronically signed by Kia Miguel MD on 4/11/2024 at 10:54 PM

## 2024-04-12 NOTE — PROGRESS NOTES
Department of Podiatry   Consult Note        Reason for Consult:  Nail Care    CHIEF COMPLAINT:  Painful Toenails    HISTORY OF PRESENT ILLNESS:     is a 83 y.o. male with significant past medical history of CAD,HTN,HLD.Podiatry consulted for nail care. Patient has long,thickened toenail that he is unable to trim on his own. He would like to have them treated today. Patient denies any N/V/D/F/C/SOB/CP and has no other pedal complaints at this time.     Past Medical History:        Diagnosis Date    Arthritis     CAD (coronary artery disease)     COPD (chronic obstructive pulmonary disease) (HCC)     Duodenal ulcer 4/12/2018    Hyperlipidemia     Hypertension     Valvular heart disease        Past Surgical History:        Procedure Laterality Date    ENDOSCOPIC ULTRASOUND (LOWER)  03/01/2018    EYE SURGERY      cataracts    JOINT REPLACEMENT         Medications Prior to Admission:    Medications Prior to Admission: metoprolol tartrate (LOPRESSOR) 50 MG tablet, Take 1 tablet by mouth 2 times daily  aspirin 325 MG EC tablet, Take 1 tablet by mouth daily  fluticasone-umeclidin-vilant (TRELEGY ELLIPTA) 100-62.5-25 MCG/ACT AEPB inhaler, Inhale 1 puff into the lungs daily  albuterol sulfate HFA (PROAIR HFA) 108 (90 Base) MCG/ACT inhaler, Inhale 2 puffs into the lungs every 6 hours as needed for Wheezing  amLODIPine (NORVASC) 5 MG tablet, Take 1 tablet by mouth daily  pantoprazole (PROTONIX) 40 MG tablet, Take 1 tablet by mouth daily  isosorbide mononitrate (IMDUR) 60 MG extended release tablet, Take 1 tablet by mouth daily  albuterol (PROVENTIL) (2.5 MG/3ML) 0.083% nebulizer solution, Take 3 mLs by nebulization 4 times daily as needed    Allergies:  Patient has no known allergies.    Social History:   TOBACCO:   reports that he quit smoking about 6 years ago. His smoking use included cigarettes. He started smoking about 56 years ago. He has a 50.0 pack-year smoking history. He has never used smokeless

## 2024-04-12 NOTE — PROGRESS NOTES
Pt ordered sitter by RRT doctor  to assure patient keeps bipap on.    Staffing called and removed sitter to go to ER

## 2024-04-12 NOTE — PROGRESS NOTES
Occupational Therapy  OT SESSION ATTEMPT     Date:2024  Patient Name: Virgilio Sanchez  MRN: [id number]  : 1940  Room: [address] [place], [place] [address]-A     Attempted OT session this date:    [] unavailable due to other medical staff currently with pt   [x] on hold per nursing staff due to resp. status   [] on hold per nursing staff secondary to lab / radiology results    [] declined Occupational Therapy  this date due to ___.  Benefits of participation in therapy reviewed with pt.    [] off unit   [] Other:     Will reattempt OT evaluation at a later time.    Tico Cooper OTR/L #[id number]

## 2024-04-12 NOTE — CONSULTS
CREATININE 1.9*  --  1.9* 2.0* 2.0*   BUN 42*  --  42* 46* 48*   LABGLOM 35*  --  35* 33* 34*   GLUCOSE 106*  --  123* 127* 79   CALCIUM 8.7  --  8.5* 8.5* 8.2*   PHOS  --   --  7.7*  --  5.3*   MG 1.6  --  1.7  --  1.9    < > = values in this interval not displayed.       No results found for: \"VITD25\"    No results found for: \"PTH\"    Recent Labs     04/11/24  1507 04/12/24  0540   ALT 19 17   AST 38 28   ALKPHOS 255* 198*   BILITOT 0.6 0.5       Recent Labs     04/11/24  1507 04/12/24  0540   LABALBU 3.3* 2.8*       Ferritin   Date Value Ref Range Status   11/14/2023 76 ng/mL Final     Comment:           FERRITIN Reference Ranges:  Adult Males   20 - 60 years:    30 - 400 ng/mL  Adult females 17 - 60 years:    13 - 150 ng/mL  Adults greater than 60 years:   no established reference range  Pediatrics:  no established reference range       Iron   Date Value Ref Range Status   11/14/2023 22 (L) 59 - 158 ug/dL Final     TIBC   Date Value Ref Range Status   11/14/2023 290 250 - 450 ug/dL Final       No results found for: \"LBKPXRIN66\"    No results found for: \"FOLATE\"      Lab Results   Component Value Date/Time    COLORU Yellow 04/12/2024 09:00 PM    NITRU NEGATIVE 04/12/2024 09:00 PM    GLUCOSEU NEGATIVE 04/12/2024 09:00 PM    KETUA NEGATIVE 04/12/2024 09:00 PM    UROBILINOGEN 0.2 04/12/2024 09:00 PM    BILIRUBINUR NEGATIVE 04/12/2024 09:00 PM       Lab Results   Component Value Date/Time    LAURENCE 29 04/12/2024 09:00 PM       No components found for: \"URIC\"    No results found for: \"LIPIDPAN\"      Assessment and Plan:    1. DEIDRE due to decreased effective renal perfusion in the setting of AMS with decreased intake in the presence of ACEi  Baseline cr 1.1-1.2  Cr. 0.2 today  FENa 0.7 % supports pre-renal etiology,  U/a neg for UTI  Awaiting PVR - pt often incontinent  Hold ACEi  IVF D5W 75 ml/hr.  Strict I&O  Avoid nephrotoxins  Monitor daily BMP    2. Acute on chronic respiratory failure/CAP  History of COPD  S/p 4/11

## 2024-04-12 NOTE — PLAN OF CARE
Problem: Safety - Adult  Goal: Free from fall injury  Outcome: Progressing     Problem: Skin/Tissue Integrity  Goal: Absence of new skin breakdown  Description: 1.  Monitor for areas of redness and/or skin breakdown  2.  Assess vascular access sites hourly  3.  Every 4-6 hours minimum:  Change oxygen saturation probe site  4.  Every 4-6 hours:  If on nasal continuous positive airway pressure, respiratory therapy assess nares and determine need for appliance change or resting period.  Outcome: Progressing     Problem: Confusion  Goal: Confusion, delirium, dementia, or psychosis is improved or at baseline  Description: INTERVENTIONS:  1. Assess for possible contributors to thought disturbance, including medications, impaired vision or hearing, underlying metabolic abnormalities, dehydration, psychiatric diagnoses, and notify attending LIP  2. [place] high risk fall precautions, as indicated  3. Provide frequent short contacts to provide reality reorientation, refocusing and direction  4. Decrease environmental stimuli, including noise as appropriate  5. Monitor and intervene to maintain adequate nutrition, hydration, elimination, sleep and activity  6. If unable to ensure safety without constant attention obtain sitter and review sitter guidelines with assigned personnel  7. Initiate Psychosocial CNS and Spiritual Care consult, as indicated  Outcome: Progressing

## 2024-04-12 NOTE — PROGRESS NOTES
Date: 4/12/2024    Time: 0215    Patient Placed On BIPAP/CPAP/ Non-Invasive Ventilation?  Patient continues    If no must comment.  Facial area red/color change? No           If YES are Blister/Lesion present?No   If yes must notify nursing staff  BIPAP/CPAP skin barrier?  Yes    Skin barrier type:mepilexlite       Comments:Patient placed on AVAPS to maintain tidal volume; FiO2 dec by abg.          Vero Hogan RCP

## 2024-04-12 NOTE — PROGRESS NOTES
4 Eyes Skin Assessment     NAME:  Virgilio Sanchez  YOB: 1940  MEDICAL RECORD NUMBER:  [id number]    The patient is being assessed for  Admission    I agree that at least one RN has performed a thorough Head to Toe Skin Assessment on the patient. ALL assessment sites listed below have been assessed.      Areas assessed by both nurses:    Head, Face, Ears, Shoulders, Back, Chest, Arms, Elbows, Hands, Sacrum. Buttock, Coccyx, Ischium, Legs. Feet and Heels, and Under Medical Devices         Does the Patient have a Wound? No noted wound(s)       Adryan Prevention initiated by RN: Yes  Wound Care Orders initiated by RN: Yes    Pressure Injury (Stage 3,4, Unstageable, DTI, NWPT, and Complex wounds) if present, place Wound referral order by RN under : No    New Ostomies, if present place, Ostomy referral order under : No     Nurse 1 eSignature: Electronically signed by Channing Selby RN on 4/12/24 at 5:00 AM EDT    **SHARE this note so that the co-signing nurse can place an eSignature**    Nurse 2 eSignature: Electronically signed by Rupal Díaz RN on 4/12/24 at 5:00 AM EDT

## 2024-04-12 NOTE — PROGRESS NOTES
Pt arrived to unit from ED w/ED RN unresponsive. 3RNs attempted to arouse pt with no success.  RRT called.

## 2024-04-12 NOTE — PROGRESS NOTES
Physical Therapy    Patient chart reviewed and received for PT evaluation. Will hold evaluation this date d/t respiratory status.   Will follow up as appropriate.     Katy Marroquin, PT, DPT  [id number]

## 2024-04-13 ENCOUNTER — APPOINTMENT (OUTPATIENT)
Dept: ULTRASOUND IMAGING | Age: 84
End: 2024-04-13
Payer: MEDICARE

## 2024-04-13 ENCOUNTER — APPOINTMENT (OUTPATIENT)
Dept: GENERAL RADIOLOGY | Age: 84
End: 2024-04-13
Payer: MEDICARE

## 2024-04-13 LAB
AADO2: 157.9 MMHG
AADO2: 346.2 MMHG
ANION GAP SERPL CALCULATED.3IONS-SCNC: 12 MMOL/L (ref 7–16)
ANION GAP SERPL CALCULATED.3IONS-SCNC: 8 MMOL/L (ref 7–16)
B.E.: -3.2 MMOL/L (ref -3–3)
B.E.: 1.5 MMOL/L (ref -3–3)
BASOPHILS # BLD: 0.17 K/UL (ref 0–0.2)
BASOPHILS NFR BLD: 1 % (ref 0–2)
BNP SERPL-MCNC: ABNORMAL PG/ML (ref 0–450)
BUN SERPL-MCNC: 42 MG/DL (ref 6–23)
BUN SERPL-MCNC: 48 MG/DL (ref 6–23)
CA-I BLD-SCNC: 1.12 MMOL/L (ref 1.15–1.33)
CALCIUM SERPL-MCNC: 8.1 MG/DL (ref 8.6–10.2)
CALCIUM SERPL-MCNC: 8.2 MG/DL (ref 8.6–10.2)
CHLORIDE SERPL-SCNC: 111 MMOL/L (ref 98–107)
CHLORIDE SERPL-SCNC: 111 MMOL/L (ref 98–107)
CO2 SERPL-SCNC: 25 MMOL/L (ref 22–29)
CO2 SERPL-SCNC: 28 MMOL/L (ref 22–29)
COHB: 0.7 % (ref 0–1.5)
COHB: 0.8 % (ref 0–1.5)
CREAT SERPL-MCNC: 1.7 MG/DL (ref 0.7–1.2)
CREAT SERPL-MCNC: 2 MG/DL (ref 0.7–1.2)
CRITICAL: ABNORMAL
CRITICAL: ABNORMAL
CRP SERPL HS-MCNC: 268 MG/L (ref 0–5)
DATE ANALYZED: ABNORMAL
DATE ANALYZED: ABNORMAL
DATE OF COLLECTION: ABNORMAL
DATE OF COLLECTION: ABNORMAL
EOSINOPHIL # BLD: 0 K/UL (ref 0.05–0.5)
EOSINOPHILS RELATIVE PERCENT: 0 % (ref 0–6)
ERYTHROCYTE [DISTWIDTH] IN BLOOD BY AUTOMATED COUNT: 19.8 % (ref 11.5–15)
FIO2: 100 %
FIO2: 40 %
GFR SERPL CREATININE-BSD FRML MDRD: 34 ML/MIN/1.73M2
GFR SERPL CREATININE-BSD FRML MDRD: 40 ML/MIN/1.73M2
GLUCOSE SERPL-MCNC: 119 MG/DL (ref 74–99)
GLUCOSE SERPL-MCNC: 79 MG/DL (ref 74–99)
HCO3: 23.7 MMOL/L (ref 22–26)
HCO3: 27.1 MMOL/L (ref 22–26)
HCT VFR BLD AUTO: 28.7 % (ref 37–54)
HGB BLD-MCNC: 7.7 G/DL (ref 12.5–16.5)
HHB: 0.1 % (ref 0–5)
HHB: 8.4 % (ref 0–5)
L PNEUMO1 AG UR QL IA.RAPID: NEGATIVE
LAB: ABNORMAL
LAB: ABNORMAL
LACTATE BLDV-SCNC: 1.9 MMOL/L (ref 0.5–2.2)
LYMPHOCYTES NFR BLD: 1.56 K/UL (ref 1.5–4)
LYMPHOCYTES RELATIVE PERCENT: 8 % (ref 20–42)
Lab: 1302
Lab: 1505
MAGNESIUM SERPL-MCNC: 1.9 MG/DL (ref 1.6–2.6)
MCH RBC QN AUTO: 20.5 PG (ref 26–35)
MCHC RBC AUTO-ENTMCNC: 26.8 G/DL (ref 32–34.5)
MCV RBC AUTO: 76.3 FL (ref 80–99.9)
METHB: 0.4 % (ref 0–1.5)
METHB: 0.5 % (ref 0–1.5)
MICROORGANISM SPEC CULT: NORMAL
MODE: ABNORMAL
MODE: AC
MONOCYTES NFR BLD: 10 % (ref 2–12)
MONOCYTES NFR BLD: 2.08 K/UL (ref 0.1–0.95)
NEUTROPHILS NFR BLD: 81 % (ref 43–80)
NEUTS SEG NFR BLD: 16.29 K/UL (ref 1.8–7.3)
O2 CONTENT: 11 ML/DL
O2 CONTENT: 11.7 ML/DL
O2 SATURATION: 91.5 % (ref 92–98.5)
O2 SATURATION: 99.9 % (ref 92–98.5)
O2HB: 90.5 % (ref 94–97)
O2HB: 98.6 % (ref 94–97)
OPERATOR ID: 366
OPERATOR ID: 405
PATIENT TEMP: 37 C
PATIENT TEMP: 37 C
PCO2: 48.2 MMHG (ref 35–45)
PCO2: 51.9 MMHG (ref 35–45)
PEEP/CPAP: 6 CMH2O
PEEP/CPAP: 8 CMH2O
PFO2: 1.69 MMHG/%
PFO2: 3.19 MMHG/%
PH BLOOD GAS: 7.28 (ref 7.35–7.45)
PH BLOOD GAS: 7.37 (ref 7.35–7.45)
PHOSPHATE SERPL-MCNC: 5.3 MG/DL (ref 2.5–4.5)
PLATELET # BLD AUTO: 294 K/UL (ref 130–450)
PMV BLD AUTO: 10.7 FL (ref 7–12)
PO2: 318.6 MMHG (ref 75–100)
PO2: 67.6 MMHG (ref 75–100)
POTASSIUM SERPL-SCNC: 4.7 MMOL/L (ref 3.5–5)
POTASSIUM SERPL-SCNC: 5.2 MMOL/L (ref 3.5–5)
PROCALCITONIN SERPL-MCNC: 1.92 NG/ML (ref 0–0.08)
RBC # BLD AUTO: 3.76 M/UL (ref 3.8–5.8)
RBC # BLD: ABNORMAL 10*6/UL
RI(T): 1.09
RI(T): 2.34
RR MECHANICAL: 20 B/MIN
RR MECHANICAL: 20 B/MIN
SODIUM SERPL-SCNC: 147 MMOL/L (ref 132–146)
SODIUM SERPL-SCNC: 148 MMOL/L (ref 132–146)
SOURCE, BLOOD GAS: ABNORMAL
SOURCE, BLOOD GAS: ABNORMAL
SPECIMEN DESCRIPTION: NORMAL
THB: 7.8 G/DL (ref 11.5–16.5)
THB: 8.6 G/DL (ref 11.5–16.5)
TIME ANALYZED: 1306
TIME ANALYZED: 1510
VT MECHANICAL: 450 ML
VT MECHANICAL: 480 ML
WBC OTHER # BLD: 20.1 K/UL (ref 4.5–11.5)

## 2024-04-13 PROCEDURE — 6370000000 HC RX 637 (ALT 250 FOR IP): Performed by: INTERNAL MEDICINE

## 2024-04-13 PROCEDURE — 71045 X-RAY EXAM CHEST 1 VIEW: CPT

## 2024-04-13 PROCEDURE — 2580000003 HC RX 258: Performed by: INTERNAL MEDICINE

## 2024-04-13 PROCEDURE — 99292 CRITICAL CARE ADDL 30 MIN: CPT | Performed by: INTERNAL MEDICINE

## 2024-04-13 PROCEDURE — 94002 VENT MGMT INPAT INIT DAY: CPT

## 2024-04-13 PROCEDURE — 5A1955Z RESPIRATORY VENTILATION, GREATER THAN 96 CONSECUTIVE HOURS: ICD-10-PCS | Performed by: INTERNAL MEDICINE

## 2024-04-13 PROCEDURE — 2000000000 HC ICU R&B

## 2024-04-13 PROCEDURE — 2500000003 HC RX 250 WO HCPCS

## 2024-04-13 PROCEDURE — 82330 ASSAY OF CALCIUM: CPT

## 2024-04-13 PROCEDURE — 85025 COMPLETE CBC W/AUTO DIFF WBC: CPT

## 2024-04-13 PROCEDURE — 2580000003 HC RX 258

## 2024-04-13 PROCEDURE — 82805 BLOOD GASES W/O2 SATURATION: CPT

## 2024-04-13 PROCEDURE — 94640 AIRWAY INHALATION TREATMENT: CPT

## 2024-04-13 PROCEDURE — 0BH17EZ INSERTION OF ENDOTRACHEAL AIRWAY INTO TRACHEA, VIA NATURAL OR ARTIFICIAL OPENING: ICD-10-PCS | Performed by: INTERNAL MEDICINE

## 2024-04-13 PROCEDURE — 86140 C-REACTIVE PROTEIN: CPT

## 2024-04-13 PROCEDURE — 84100 ASSAY OF PHOSPHORUS: CPT

## 2024-04-13 PROCEDURE — 83735 ASSAY OF MAGNESIUM: CPT

## 2024-04-13 PROCEDURE — 84145 PROCALCITONIN (PCT): CPT

## 2024-04-13 PROCEDURE — 6360000002 HC RX W HCPCS: Performed by: INTERNAL MEDICINE

## 2024-04-13 PROCEDURE — 2700000000 HC OXYGEN THERAPY PER DAY

## 2024-04-13 PROCEDURE — 76770 US EXAM ABDO BACK WALL COMP: CPT

## 2024-04-13 PROCEDURE — 2500000003 HC RX 250 WO HCPCS: Performed by: INTERNAL MEDICINE

## 2024-04-13 PROCEDURE — 94660 CPAP INITIATION&MGMT: CPT

## 2024-04-13 PROCEDURE — 2580000003 HC RX 258: Performed by: STUDENT IN AN ORGANIZED HEALTH CARE EDUCATION/TRAINING PROGRAM

## 2024-04-13 PROCEDURE — 36600 WITHDRAWAL OF ARTERIAL BLOOD: CPT

## 2024-04-13 PROCEDURE — 6360000002 HC RX W HCPCS

## 2024-04-13 PROCEDURE — 93970 EXTREMITY STUDY: CPT

## 2024-04-13 PROCEDURE — 80048 BASIC METABOLIC PNL TOTAL CA: CPT

## 2024-04-13 PROCEDURE — 31500 INSERT EMERGENCY AIRWAY: CPT

## 2024-04-13 PROCEDURE — 99291 CRITICAL CARE FIRST HOUR: CPT | Performed by: INTERNAL MEDICINE

## 2024-04-13 PROCEDURE — 83605 ASSAY OF LACTIC ACID: CPT

## 2024-04-13 PROCEDURE — 83880 ASSAY OF NATRIURETIC PEPTIDE: CPT

## 2024-04-13 PROCEDURE — 31500 INSERT EMERGENCY AIRWAY: CPT | Performed by: INTERNAL MEDICINE

## 2024-04-13 PROCEDURE — 36415 COLL VENOUS BLD VENIPUNCTURE: CPT

## 2024-04-13 PROCEDURE — 74018 RADEX ABDOMEN 1 VIEW: CPT

## 2024-04-13 RX ORDER — ETOMIDATE 2 MG/ML
20 INJECTION INTRAVENOUS ONCE
Status: COMPLETED | OUTPATIENT
Start: 2024-04-13 | End: 2024-04-13

## 2024-04-13 RX ORDER — MIDAZOLAM HYDROCHLORIDE 1 MG/ML
INJECTION INTRAMUSCULAR; INTRAVENOUS
Status: COMPLETED
Start: 2024-04-13 | End: 2024-04-13

## 2024-04-13 RX ORDER — SUCCINYLCHOLINE CHLORIDE 20 MG/ML
100 INJECTION INTRAMUSCULAR; INTRAVENOUS ONCE
Status: COMPLETED | OUTPATIENT
Start: 2024-04-13 | End: 2024-04-13

## 2024-04-13 RX ORDER — MIDAZOLAM HYDROCHLORIDE 2 MG/2ML
2 INJECTION, SOLUTION INTRAMUSCULAR; INTRAVENOUS ONCE
Status: COMPLETED | OUTPATIENT
Start: 2024-04-13 | End: 2024-04-13

## 2024-04-13 RX ORDER — SUCCINYLCHOLINE CHLORIDE 20 MG/ML
INJECTION INTRAMUSCULAR; INTRAVENOUS
Status: COMPLETED
Start: 2024-04-13 | End: 2024-04-13

## 2024-04-13 RX ORDER — DEXTROSE MONOHYDRATE 50 MG/ML
INJECTION, SOLUTION INTRAVENOUS CONTINUOUS
Status: DISCONTINUED | OUTPATIENT
Start: 2024-04-13 | End: 2024-04-15

## 2024-04-13 RX ORDER — SODIUM CHLORIDE 0.9 % (FLUSH) 0.9 %
SYRINGE (ML) INJECTION
Status: COMPLETED
Start: 2024-04-13 | End: 2024-04-13

## 2024-04-13 RX ORDER — CHLORHEXIDINE GLUCONATE ORAL RINSE 1.2 MG/ML
15 SOLUTION DENTAL 2 TIMES DAILY
Status: DISCONTINUED | OUTPATIENT
Start: 2024-04-13 | End: 2024-04-19

## 2024-04-13 RX ORDER — FENTANYL CITRATE-0.9 % NACL/PF 10 MCG/ML
25-200 PLASTIC BAG, INJECTION (ML) INTRAVENOUS CONTINUOUS
Status: DISCONTINUED | OUTPATIENT
Start: 2024-04-13 | End: 2024-04-19

## 2024-04-13 RX ORDER — DEXAMETHASONE SODIUM PHOSPHATE 10 MG/ML
6 INJECTION, SOLUTION INTRAMUSCULAR; INTRAVENOUS DAILY
Status: COMPLETED | OUTPATIENT
Start: 2024-04-13 | End: 2024-04-17

## 2024-04-13 RX ORDER — ETOMIDATE 2 MG/ML
INJECTION INTRAVENOUS
Status: COMPLETED
Start: 2024-04-13 | End: 2024-04-13

## 2024-04-13 RX ORDER — FENTANYL CITRATE-0.9 % NACL/PF 10 MCG/ML
PLASTIC BAG, INJECTION (ML) INTRAVENOUS
Status: COMPLETED
Start: 2024-04-13 | End: 2024-04-13

## 2024-04-13 RX ADMIN — DEXAMETHASONE SODIUM PHOSPHATE 6 MG: 10 INJECTION, SOLUTION INTRAMUSCULAR; INTRAVENOUS at 16:05

## 2024-04-13 RX ADMIN — CHLORHEXIDINE GLUCONATE 15 ML: 1.2 RINSE ORAL at 21:27

## 2024-04-13 RX ADMIN — IPRATROPIUM BROMIDE 0.5 MG: 0.5 SOLUTION RESPIRATORY (INHALATION) at 16:23

## 2024-04-13 RX ADMIN — DEXTROSE MONOHYDRATE: 50 INJECTION, SOLUTION INTRAVENOUS at 14:06

## 2024-04-13 RX ADMIN — SUCCINYLCHOLINE CHLORIDE 100 MG: 20 INJECTION, SOLUTION INTRAMUSCULAR; INTRAVENOUS at 13:51

## 2024-04-13 RX ADMIN — IPRATROPIUM BROMIDE 0.5 MG: 0.5 SOLUTION RESPIRATORY (INHALATION) at 12:30

## 2024-04-13 RX ADMIN — GUAIFENESIN 400 MG: 400 TABLET ORAL at 21:27

## 2024-04-13 RX ADMIN — CHLORHEXIDINE GLUCONATE 15 ML: 1.2 RINSE ORAL at 16:24

## 2024-04-13 RX ADMIN — ETOMIDATE 20 MG: 2 INJECTION INTRAVENOUS at 13:50

## 2024-04-13 RX ADMIN — SODIUM CHLORIDE, PRESERVATIVE FREE 10 ML: 5 INJECTION INTRAVENOUS at 14:16

## 2024-04-13 RX ADMIN — DOXYCYCLINE 100 MG: 100 INJECTION, POWDER, LYOPHILIZED, FOR SOLUTION INTRAVENOUS at 05:51

## 2024-04-13 RX ADMIN — MIDAZOLAM HYDROCHLORIDE 2 MG: 2 INJECTION, SOLUTION INTRAMUSCULAR; INTRAVENOUS at 15:08

## 2024-04-13 RX ADMIN — MIDAZOLAM 2 MG: 1 INJECTION INTRAMUSCULAR; INTRAVENOUS at 15:08

## 2024-04-13 RX ADMIN — Medication 200 MCG/HR: at 19:11

## 2024-04-13 RX ADMIN — ENOXAPARIN SODIUM 40 MG: 100 INJECTION SUBCUTANEOUS at 09:06

## 2024-04-13 RX ADMIN — VANCOMYCIN HYDROCHLORIDE 1500 MG: 10 INJECTION, POWDER, LYOPHILIZED, FOR SOLUTION INTRAVENOUS at 16:14

## 2024-04-13 RX ADMIN — Medication 50 MCG/HR: at 14:06

## 2024-04-13 RX ADMIN — BUDESONIDE 250 MCG: 0.25 INHALANT RESPIRATORY (INHALATION) at 09:33

## 2024-04-13 RX ADMIN — ETOMIDATE 20 MG: 2 INJECTION, SOLUTION INTRAVENOUS at 13:50

## 2024-04-13 RX ADMIN — ARFORMOTEROL TARTRATE 15 MCG: 15 SOLUTION RESPIRATORY (INHALATION) at 09:33

## 2024-04-13 RX ADMIN — SUCCINYLCHOLINE CHLORIDE 100 MG: 20 INJECTION INTRAMUSCULAR; INTRAVENOUS at 13:51

## 2024-04-13 RX ADMIN — IPRATROPIUM BROMIDE 0.5 MG: 0.5 SOLUTION RESPIRATORY (INHALATION) at 20:33

## 2024-04-13 RX ADMIN — BUDESONIDE 250 MCG: 0.25 INHALANT RESPIRATORY (INHALATION) at 20:33

## 2024-04-13 RX ADMIN — ANTI-FUNGAL POWDER MICONAZOLE NITRATE TALC FREE: 1.42 POWDER TOPICAL at 20:10

## 2024-04-13 RX ADMIN — SODIUM ZIRCONIUM CYCLOSILICATE 10 G: 10 POWDER, FOR SUSPENSION ORAL at 16:20

## 2024-04-13 RX ADMIN — DEXTROSE MONOHYDRATE: 50 INJECTION, SOLUTION INTRAVENOUS at 09:17

## 2024-04-13 RX ADMIN — IPRATROPIUM BROMIDE 0.5 MG: 0.5 SOLUTION RESPIRATORY (INHALATION) at 09:32

## 2024-04-13 RX ADMIN — ANTI-FUNGAL POWDER MICONAZOLE NITRATE TALC FREE: 1.42 POWDER TOPICAL at 09:07

## 2024-04-13 RX ADMIN — PIPERACILLIN AND TAZOBACTAM 4500 MG: 4; .5 INJECTION, POWDER, FOR SOLUTION INTRAVENOUS at 16:16

## 2024-04-13 RX ADMIN — KETAMINE HYDROCHLORIDE 0.2 MG/KG/HR: 100 INJECTION INTRAMUSCULAR; INTRAVENOUS at 15:59

## 2024-04-13 ASSESSMENT — PULMONARY FUNCTION TESTS
PIF_VALUE: 28
PIF_VALUE: 29
PIF_VALUE: 27
PIF_VALUE: 26
PIF_VALUE: 32
PIF_VALUE: 30
PIF_VALUE: 27
PIF_VALUE: 29
PIF_VALUE: 30
PIF_VALUE: 29
PIF_VALUE: 31
PIF_VALUE: 30
PIF_VALUE: 25

## 2024-04-13 NOTE — PROGRESS NOTES
Antibiotic Extended Infusion Policy     This patient is on medication that requires renal, weight, and/or indication dose adjustment.      Date Body Weight IBW  Adjusted BW SCr  CrCl Dialysis status BMI   4/13/2024 74 kg (163 lb 2.3 oz) Ideal body weight: 77.6 kg (171 lb 1.2 oz) Serum creatinine: 2 mg/dL (H) 04/13/24 0543  Estimated creatinine clearance: 30 mL/min (A) N/a Body mass index is 22.13 kg/m².       Pharmacy has dose-adjusted the following medication(s):    Ordered Medication: Zosyn 3375 mg q8h     Order Changed/converted to: Zosyn 4500mg q8h    These changes were made per protocol according to the [facility]   Automatic Extended Infusion Dose Adjustment Policy.     *Please note this dose may need readjusted if patient's condition changes.    Please contact pharmacy with any questions regarding these changes.    Lo Navas RPH  4/13/2024  2:19 PM

## 2024-04-13 NOTE — PROGRESS NOTES
04/13/24 1020   NIV Type   Mask Type (S)  Total face   Mask Size Large   Settings/Measurements   Vt (Measured) 510 mL   Mask Leak (lpm) (S)  70 lpm  (best I could get w patient his mouth stays open)     Patient comfort increased and mask is no longer on the areas that were red

## 2024-04-13 NOTE — PROGRESS NOTES
Date: 4/13/2024    Time: 10:01 AM    Patient Placed On BIPAP/CPAP/ Non-Invasive Ventilation?  Yes    If no must comment.  Facial area red/color change? Yes           If YES are Blister/Lesion present?No   If yes must notify nursing staff  BIPAP/CPAP skin barrier?  Yes    Skin barrier type:mepilexlite          04/13/24 0934   NIV Type   NIV Started/Stopped On   Equipment Type V60   Mode AVAPS   Mask Type Full face mask   Mask Size Medium   Assessment   Comfort Level Good   Using Accessory Muscles No   Mask Compliance Good   Skin Assessment Redness (see comment/note)  (face is starting to get red from mask will try to find a firemans mask to alleviate that. Attempted patient off AVAPS to look under mask and give break but he did not tolerate more than a couple of minutes)   Skin Protection for O2 Device Yes   Orientation Middle   Location Nose   Intervention(s) Skin Barrier   Breath Sounds   Right Upper Lobe Rhonchi   Settings/Measurements   PIP Observed 17 cm H20   CPAP/EPAP 6 cmH2O   IPAP Min 16 cmH2O   IPAP Max 25 cmH2O   Vt (Set, mL) 480 mL   Vt (Measured) 476 mL   Rate Ordered 16   Insp Rise Time (%) 2 %   FiO2  65 %   I Time/ I Time % 0.85 s   Minute Volume (L/min) 13.9 Liters   Mask Leak (lpm) 50 lpm   Patient's Home Machine No   Alarm Settings   Alarms On Y   Low Pressure (cmH2O) 10 cmH2O   High Pressure (cmH2O) 35 cmH2O   Apnea (secs) 20 secs   RR Low (bpm) 10   RR High (bpm) 35 br/min             Ashley Snell RCP

## 2024-04-13 NOTE — PROGRESS NOTES
04/13/24 1306   Oxygen Therapy/Pulse Ox   Blood Gas  Performed? Yes   $ABG $Arterial Puncture   Emre's Test #1 Pos   Site #1 Right Radial   Site Prepped #1 Yes   Number of Attempts #1 1   Pressure Held #1 Yes   Complications #1 None   Post-procedure #1 Standard   Specimen Status #1 To lab   How Tolerated? Tolerated well

## 2024-04-13 NOTE — PLAN OF CARE
Problem: Safety - Medical Restraint  Goal: Remains free of injury from restraints (Restraint for Interference with Medical Device)  Description: INTERVENTIONS:  1. Determine that other, less restrictive measures have been tried or would not be effective before applying the restraint  2. Evaluate the patient's condition at the time of restraint application  3. Inform patient/family regarding the reason for restraint  4. Q2H: Monitor safety, psychosocial status, comfort, nutrition and hydration  Outcome: Not Progressing  Flowsheets (Taken 4/13/2024 1401)  Remains free of injury from restraints (restraint for interference with medical device):   Determine that other, less restrictive measures have been tried or would not be effective before applying the restraint   Evaluate the patient's condition at the time of restraint application   Inform patient/family regarding the reason for restraint   Every 2 hours: Monitor safety, psychosocial status, comfort, nutrition and hydration     Problem: Safety - Medical Restraint  Goal: Remains free of injury from restraints (Restraint for Interference with Medical Device)  Description: INTERVENTIONS:  1. Determine that other, less restrictive measures have been tried or would not be effective before applying the restraint  2. Evaluate the patient's condition at the time of restraint application  3. Inform patient/family regarding the reason for restraint  4. Q2H: Monitor safety, psychosocial status, comfort, nutrition and hydration  Outcome: Not Progressing  Flowsheets (Taken 4/13/2024 1401)  Remains free of injury from restraints (restraint for interference with medical device):   Determine that other, less restrictive measures have been tried or would not be effective before applying the restraint   Evaluate the patient's condition at the time of restraint application   Inform patient/family regarding the reason for restraint   Every 2 hours: Monitor safety, psychosocial  status, comfort, nutrition and hydration

## 2024-04-13 NOTE — PROGRESS NOTES
Pharmacy Consultation Note  (Antibiotic Dosing and Monitoring)    Initial consult date: 4/13/24  Consulting physician/provider: Dr. Espana  Drug: Vancomycin  Indication: Aspiration Pneumonia    Age/  Gender Height Weight IBW  Allergy Information   83 y.o./male 182.9 cm (6') 74 kg (163 lb 2.3 oz)     Ideal body weight: 77.6 kg (171 lb 1.2 oz)   Patient has no known allergies.      Renal Function:  Recent Labs     04/11/24  2220 04/12/24  0540 04/13/24  0543   BUN 42* 46* 48*   CREATININE 1.9* 2.0* 2.0*       Intake/Output Summary (Last 24 hours) at 4/13/2024 1457  Last data filed at 4/12/2024 2315  Gross per 24 hour   Intake 0 ml   Output 425 ml   Net -425 ml       Vancomycin Monitoring:  Trough:  No results for input(s): \"VANCOTROUGH\" in the last 72 hours.  Random:  No results for input(s): \"VANCORANDOM\" in the last 72 hours.    Vancomycin Administration Times:  Recent vancomycin administrations        No vancomycin IV orders with administrations found.            Orders not given:            vancomycin (VANCOCIN) 1500 mg in sodium chloride 0.9 % 250 mL IVPB                    Assessment:  Patient is a 83 y.o. male who has been initiated on vancomycin  Estimated Creatinine Clearance: 30 mL/min (A) (based on SCr of 2 mg/dL (H)).  Vancomycin 1500 mg IV x1 already ordered today      Plan:  Will start Vancomycin 1000 mg IV every 24 hours starting tomorrow   Will check vancomycin levels when appropriate  Will continue to monitor renal function   Pharmacy to follow      Sy García RPH 4/13/2024 2:57 PM    PETRA: [phone]  SEY: [phone]  SJW: [phone]

## 2024-04-13 NOTE — PROCEDURES
Glidescope INTUBATION    Date: 04/13/24   Time: 2:37 PM    Acute hypoxic hypercapnic respiratory failure    A time-out was completed. The patient was placed in a flat position. Sedation was obtained using Etomidate 20 mg, 100 mg succinylcholine, The patient was easily ventilated using an ambu bag. The glide blade was used and inserted into the oropharynx at which time there was a Grade 2 view of the vocal cords.     Large amount of inspissated secretions was found blocking the airway.  This was removed by suctioning and physically removing them.    A 7.5-Icelandic endotracheal tube was inserted and visualized going through the vocal cords. The stylette was removed. Colorimetric change was visualized on the CO2 meter. Breath sounds were heard in both lung fields equally. The endotracheal tube was placed at 23 cm, measured at the lip. The patient tolerated the procedure well and there were no immediate complications.  A chest x-ray was ordered to verify endotracheal tube placement.     The patient tolerated the procedure well and there were no complications.    Akshat Espana MD MS  Pulmonary & Critical Care Medicine  [facility]

## 2024-04-13 NOTE — PROGRESS NOTES
More responsive today  Does recognize me   RRR  Markedly diminished breath sounds   Moderate peripheral edema  Discussed with Dr Peters   Patient to be transferred to ICU  I had a long discussion with his wife again today   She wants to continue all aggressive measures needed

## 2024-04-13 NOTE — FLOWSHEET NOTE
Restless and attempting to self-extubate.  Unable to redirect.  Bilat soft wrist restratints applied.  Wife updated.

## 2024-04-13 NOTE — PLAN OF CARE
Problem: Safety - Adult  Goal: Free from fall injury  Outcome: Progressing     Problem: Discharge Planning  Goal: Discharge to home or other facility with appropriate resources  Outcome: Progressing     Problem: Skin/Tissue Integrity  Goal: Absence of new skin breakdown  Description: 1.  Monitor for areas of redness and/or skin breakdown  2.  Assess vascular access sites hourly  3.  Every 4-6 hours minimum:  Change oxygen saturation probe site  4.  Every 4-6 hours:  If on nasal continuous positive airway pressure, respiratory therapy assess nares and determine need for appliance change or resting period.  Outcome: Progressing     Problem: Confusion  Goal: Confusion, delirium, dementia, or psychosis is improved or at baseline  Description: INTERVENTIONS:  1. Assess for possible contributors to thought disturbance, including medications, impaired vision or hearing, underlying metabolic abnormalities, dehydration, psychiatric diagnoses, and notify attending LIP  2. [place] high risk fall precautions, as indicated  3. Provide frequent short contacts to provide reality reorientation, refocusing and direction  4. Decrease environmental stimuli, including noise as appropriate  5. Monitor and intervene to maintain adequate nutrition, hydration, elimination, sleep and activity  6. If unable to ensure safety without constant attention obtain sitter and review sitter guidelines with assigned personnel  7. Initiate Psychosocial CNS and Spiritual Care consult, as indicated  Outcome: Progressing

## 2024-04-13 NOTE — PROGRESS NOTES
Chris Brown M.D.  Ish Lagunas D.O.  Ansley Everett M.D.  Mary Alice Chavez M.D.   Noel Peters D.O.          Daily Pulmonary Progress Note    Patient:  Virgilio Sanchez 83 y.o. male MRN: [id number]     Date of Service: 4/13/2024        Subjective      Patient was seen and examined.    Remains confused and altered.  Remains on NIV.  Attempted to switch off for break patient ripped off nasal cannula and not cooperative.    Objective   Vitals: /78   Pulse (!) 106   Temp 96.9 °F (36.1 °C) (Temporal)   Resp 20   Ht 1.829 m (6')   Wt 74 kg (163 lb 2.3 oz)   SpO2 100%   BMI 22.13 kg/m²     I/O:    Intake/Output Summary (Last 24 hours) at 4/13/2024 1133  Last data filed at 4/12/2024 2315  Gross per 24 hour   Intake 0 ml   Output 425 ml   Net -425 ml       CURRENT MEDS :  Scheduled Meds:   miconazole   Topical BID    amLODIPine  5 mg Oral Daily    aspirin  81 mg Oral Daily    guaiFENesin  400 mg Oral TID    isosorbide mononitrate  60 mg Oral Daily    [Held by provider] lisinopril  10 mg Oral Daily    metoprolol tartrate  50 mg Oral BID    pantoprazole  40 mg Oral Daily    atorvastatin  40 mg Oral Daily    enoxaparin  40 mg SubCUTAneous Daily    cefTRIAXone (ROCEPHIN) IV  1,000 mg IntraVENous Q24H    doxycycline (VIBRAMYCIN) IV  100 mg IntraVENous Q12H    budesonide  0.25 mg Nebulization BID RT    And    ipratropium  0.5 mg Nebulization 4x Daily RT    And    arformoterol tartrate  15 mcg Nebulization BID RT       Continuous Infusions:   dextrose 75 mL/hr at 04/13/24 0917    dextrose         PRN Meds:  glucose, dextrose bolus **OR** dextrose bolus, glucagon (rDNA), dextrose, albuterol, acetaminophen      Physical Exam:  Physical Exam  Constitutional:       Appearance: He is ill-appearing.   HENT:      Head: Normocephalic and atraumatic.      Mouth/Throat:      Mouth: Mucous membranes are dry.   Eyes:      Conjunctiva/sclera: Conjunctivae normal.   Neck:      Trachea: No tracheal deviation.

## 2024-04-13 NOTE — PROGRESS NOTES
Patient currently NPO and requiring a swallow evaluation. Held PO meds including guifenesin and metoprolol. BP was 113/52 and HR 93.     Jr Morales RN

## 2024-04-13 NOTE — PROGRESS NOTES
Pharmacy Consultation Note  (Antibiotic Dosing and Monitoring)    Initial consult date: 4/13/24  Consulting physician/provider: Dr. Espana  Drug: Vancomycin  Indication: Bacteremia    Age/  Gender Height Weight IBW  Allergy Information   83 y.o./male 182.9 cm (6') 74 kg (163 lb 2.3 oz)     Ideal body weight: 77.6 kg (171 lb 1.2 oz)   Patient has no known allergies.      Renal Function:  Recent Labs     04/11/24  2220 04/12/24  0540 04/13/24  0543   BUN 42* 46* 48*   CREATININE 1.9* 2.0* 2.0*       Intake/Output Summary (Last 24 hours) at 4/13/2024 1511  Last data filed at 4/12/2024 2315  Gross per 24 hour   Intake 0 ml   Output 425 ml   Net -425 ml       Vancomycin Monitoring:  Trough:  No results for input(s): \"VANCOTROUGH\" in the last 72 hours.  Random:  No results for input(s): \"VANCORANDOM\" in the last 72 hours.    Vancomycin Administration Times:  Recent vancomycin administrations        No vancomycin IV orders with administrations found.            Orders not given:            vancomycin (VANCOCIN) 1500 mg in sodium chloride 0.9 % 250 mL IVPB    vancomycin (VANCOCIN) intermittent dosing (placeholder)                    Assessment:  Patient is a 83 y.o. male who has been initiated on vancomycin  Estimated Creatinine Clearance: 30 mL/min (A) (based on SCr of 2 mg/dL (H)).  Blood culture revealing for GPC in clusters    Plan:  Will continue vancomycin 1500 mg IV once  Random tomorrow AM    Juany Cook, PharmD, BCPS, BCCCP 4/13/2024 3:11 PM

## 2024-04-13 NOTE — PROGRESS NOTES
4 Eyes Skin Assessment     NAME:  Virgilio Sanchez  YOB: 1940  MEDICAL RECORD NUMBER:  [id number]    The patient is being assessed for  Transfer to New Unit transfer from  to MICU    I agree that at least one RN has performed a thorough Head to Toe Skin Assessment on the patient. ALL assessment sites listed below have been assessed.      Areas assessed by both nurses:    Head, Face, Ears, Shoulders, Back, Chest, Arms, Elbows, Hands, Sacrum. Buttock, Coccyx, Ischium, Legs. Feet and Heels, and Under Medical Devices         Does the Patient have a Wound? Yes wound(s) were present on assessment. LDA wound assessment was Initiated and completed by RN     .  Significant micah area excoriation/redness.   Coccyx blanchable redness   R hip non-blanchable redness   Heels red, blanchable, boggy  RLE reddened with thick white scales on skin      Adryan Prevention initiated by RN: Yes  Wound Care Orders initiated by RN: Yes    Pressure Injury (Stage 3,4, Unstageable, DTI, NWPT, and Complex wounds) if present, place Wound referral order by RN under : No    New Ostomies, if present place, Ostomy referral order under : No     Nurse 1 eSignature: Electronically signed by Ashia Queen RN on 4/13/24 at 6:04 PM EDT    **SHARE this note so that the co-signing nurse can place an eSignature**    Nurse 2 eSignature: Electronically signed by Karen Ahumada RN on 4/13/24 at 2:34 PM EDT

## 2024-04-13 NOTE — PROGRESS NOTES
04/13/24 1141   NIV Type   NIV Started/Stopped On   Equipment Type V60   Mode AVAPS   Mask Type Total face   Mask Size Large   Assessment   Respirations (!) (S)  37   Comfort Level Good   Using Accessory Muscles No   Mask Compliance Fair   Skin Assessment Clean, dry, & intact   Breath Sounds   Right Upper Lobe Rhonchi;Wheezing   Right Middle Lobe Rhonchi   Right Lower Lobe Rhonchi   Left Upper Lobe Rhonchi;Wheezing   Left Lower Lobe Rhonchi   Settings/Measurements   PIP Observed 20 cm H20   CPAP/EPAP 6 cmH2O   IPAP Min 15 cmH2O   IPAP Max 25 cmH2O   Vt (Set, mL) 480 mL   Vt (Measured) 540 mL   Rate Ordered 20   Insp Rise Time (%) 2 %   FiO2  40 %   I Time/ I Time % 0.85 s   Minute Volume (L/min) 19.8 Liters   Mask Leak (lpm) 58 lpm  (Pt is not doing very well with mask compliance trying to make it work as best as I can before transfer to ICU pt keeps pulling at mask and at one point ripped it off trying to ventilate as best as possible)   Patient's Home Machine No   Alarm Settings   Alarms On Y   Low Pressure (cmH2O) 10 cmH2O   High Pressure (cmH2O) 35 cmH2O   Apnea (secs) 20 secs   RR Low (bpm) 10   RR High (bpm) 35 br/min

## 2024-04-13 NOTE — CONSULTS
[facility]   Division of Pulmonary, Critical Care and Sleep Medicine  H&P Note    Akshat Espana MD, MS    Patient: Virgilio Sanchez  MRN: [id number]  : 1940    Encounter Time: 2:26 PM     Date of Admission: 2024  2:16 PM    Primary Care Physician: Jef Altamirano MD    Reason for ADMISSION to ICU: Worsening respiratory failure     HISTORY OF PRESENT ILLNESS : Virgilio Sanchez 83 y.o. male was seen in consultation regarding the above chief compliant.    History of CAD/stent, valvular heart disease, hypertension, COPD, chronic hypoxic respiratory failure, duodenal ulcer/massive GI bleeding 2018 requiring GDA embolization, prior alcohol abuse.  Patient presented with shortness of breath and altered mental status on .  Was seen by pulmonology, placed initially on noninvasive ventilation/AVAPS.  However due to lack of improvement clinically and on blood gas, patient was transferred to ICU for consideration of intubation and mechanical ventilation.  Chest x-ray showed left upper lobe airspace disease.  He was on antibiotics.  Blood cultures showing GPC in clusters.    PAST MEDICAL HISTORY:  has a past medical history of Arthritis, CAD (coronary artery disease), COPD (chronic obstructive pulmonary disease) (HCC), Duodenal ulcer, Hyperlipidemia, Hypertension, and Valvular heart disease.    SURGICAL HISTORY:  has a past surgical history that includes joint replacement; eye surgery; and Endoscopic ultrasonography, GI (2018).     SOCIAL HISTORY:  reports that he quit smoking about 6 years ago. His smoking use included cigarettes. He started smoking about 56 years ago. He has a 50.0 pack-year smoking history. He has never used smokeless tobacco. He reports that he does not currently use alcohol. He reports that he does not currently use drugs after having used the following drugs: Marijuana (Weed).     FAMILY  HISTORY: family history includes Heart Attack in his father; Ovarian Cancer in his mother.  MD MISHA    sodium zirconium cyclosilicate (LOKELMA) oral suspension 10 g, 10 g, Oral, Daily, Akshat Espana MD    glucose chewable tablet 16 g, 4 tablet, Oral, PRN, Kia Miguel MD    dextrose bolus 10% 125 mL, 125 mL, IntraVENous, PRN **OR** dextrose bolus 10% 250 mL, 250 mL, IntraVENous, PRN, Kia Miguel MD    glucagon injection 1 mg, 1 mg, SubCUTAneous, PRN, Kia Miguel MD    dextrose 10 % infusion, , IntraVENous, Continuous PRN, Kia Miguel MD    miconazole (MICOTIN) 2 % powder, , Topical, BID, Jef Altamirano MD, Given at 04/13/24 0907    albuterol (PROVENTIL) (2.5 MG/3ML) 0.083% nebulizer solution 2.5 mg, 2.5 mg, Nebulization, 4x Daily PRN, Jef Altamirano MD    amLODIPine (NORVASC) tablet 5 mg, 5 mg, Oral, Daily, Jef Altamirano MD    aspirin EC tablet 81 mg, 81 mg, Oral, Daily, Jef Altamirano MD    guaiFENesin tablet 400 mg, 400 mg, Oral, TID, Jef Altamirano MD    isosorbide mononitrate (IMDUR) extended release tablet 60 mg, 60 mg, Oral, Daily, Jef Altamirano MD    [Held by provider] lisinopril (PRINIVIL;ZESTRIL) tablet 10 mg, 10 mg, Oral, Daily, Jef Altamirano MD    metoprolol tartrate (LOPRESSOR) tablet 50 mg, 50 mg, Oral, BID, Jef Altamirano MD    pantoprazole (PROTONIX) tablet 40 mg, 40 mg, Oral, Daily, Jef Altamirano MD    atorvastatin (LIPITOR) tablet 40 mg, 40 mg, Oral, Daily, Jef Altamirano MD    enoxaparin (LOVENOX) injection 40 mg, 40 mg, SubCUTAneous, Daily, Jfe Altamirano MD, 40 mg at 04/13/24 0906    acetaminophen (TYLENOL) tablet 650 mg, 650 mg, Oral, Q6H PRN, Jef Altamirano MD    budesonide (PULMICORT) nebulizer suspension 250 mcg, 0.25 mg, Nebulization, BID RT, 250 mcg at 04/13/24 0933 **AND** ipratropium (ATROVENT) 0.02 % nebulizer solution 0.5 mg, 0.5 mg, Nebulization, 4x Daily RT, 0.5 mg at 04/13/24 1230 **AND** [DISCONTINUED] arformoterol tartrate (BROVANA) nebulizer solution 15 mcg, 15 mcg, Nebulization,

## 2024-04-13 NOTE — H&P
[facility]              [address] [place] [address]                           HISTORY & PHYSICAL      PATIENT NAME: SANCHEZ ENRIQUEZ                : 1940  MED REC NO: [id number]                        ROOM: [facility]  ACCOUNT NO: [id number]                       ADMIT DATE: 2024  PROVIDER: Jef Altamirano MD      IDENTIFICATION:  The patient is 83-year-old male.    CHIEF COMPLAINT:  Acute on chronic respiratory failure.    HISTORY OF PRESENT ILLNESS:  The patient is an 83-year-old gentleman with history of severe COPD and chronic hypoxic respiratory failure, coronary artery disease with stenting, previous severe GI bleeding in 2018 requiring embolization of the gastroduodenal ulcer, and poor medical compliance with followup, who presents to the emergency room with acute respiratory distress worsening over the last several days.  It was acute on chronic respiratory failure with a community-acquired pneumonia and acute kidney injury plus acute encephalopathy.    MEDICATIONS:  See chart.    PAST MEDICAL HISTORY:  Severe COPD, chronic hypoxic respiratory failure, coronary artery disease with stenting, DVT, aortic valvular stenosis, hypertension, hyperlipidemia, GI bleeding in 2018 requiring embolization of the gastroduodenal artery, poor compliance.    SOCIAL HISTORY:  Previous history of alcohol abuse in remission and previous smoking also in remission.    FAMILY HISTORY:  Not available.    REVIEW OF SYSTEMS:  The patient is poorly responsive, unable to provide any review of systems.    PHYSICAL EXAMINATION:  GENERAL:  The patient is a poorly responsive, elderly male in acute respiratory failure.  HEENT:  Head normal size and shape.  Pupils are reactive.  Sclerae clear.  NECK:  Veins are flat.  No carotid bruits.  No jugular venous distention.  LUNGS:  Diminished breath sounds bilaterally.  CARDIOVASCULAR:  Tachycardia with diminished breath

## 2024-04-13 NOTE — PROGRESS NOTES
This RN completed bed bath with PCA. Large amount of dry flaky skin off both legs and scrotum. Lotion applied to legs and miconazole applied to scrotum and buttocks.

## 2024-04-14 ENCOUNTER — APPOINTMENT (OUTPATIENT)
Dept: GENERAL RADIOLOGY | Age: 84
End: 2024-04-14
Payer: MEDICARE

## 2024-04-14 PROBLEM — R09.02 HYPOXIA: Status: ACTIVE | Noted: 2024-04-14

## 2024-04-14 PROBLEM — E44.0 MODERATE PROTEIN-CALORIE MALNUTRITION (HCC): Status: ACTIVE | Noted: 2024-04-14

## 2024-04-14 PROBLEM — Z51.5 PALLIATIVE CARE ENCOUNTER: Status: ACTIVE | Noted: 2024-04-14

## 2024-04-14 LAB
AADO2: 204.5 MMHG
ANION GAP SERPL CALCULATED.3IONS-SCNC: 11 MMOL/L (ref 7–16)
B.E.: -0.9 MMOL/L (ref -3–3)
BASOPHILS # BLD: 0.14 K/UL (ref 0–0.2)
BASOPHILS NFR BLD: 1 % (ref 0–2)
BUN SERPL-MCNC: 42 MG/DL (ref 6–23)
CALCIUM SERPL-MCNC: 8 MG/DL (ref 8.6–10.2)
CHLORIDE SERPL-SCNC: 111 MMOL/L (ref 98–107)
CO2 SERPL-SCNC: 24 MMOL/L (ref 22–29)
COHB: 1.5 % (ref 0–1.5)
CREAT SERPL-MCNC: 1.5 MG/DL (ref 0.7–1.2)
CRITICAL: ABNORMAL
DATE ANALYZED: ABNORMAL
DATE LAST DOSE: NORMAL
DATE OF COLLECTION: ABNORMAL
EOSINOPHIL # BLD: 0 K/UL (ref 0.05–0.5)
EOSINOPHILS RELATIVE PERCENT: 0 % (ref 0–6)
ERYTHROCYTE [DISTWIDTH] IN BLOOD BY AUTOMATED COUNT: 19.2 % (ref 11.5–15)
FERRITIN SERPL-MCNC: 127 NG/ML
FIO2: 45 %
FOLATE SERPL-MCNC: 2.1 NG/ML (ref 4.8–24.2)
GFR SERPL CREATININE-BSD FRML MDRD: 45 ML/MIN/1.73M2
GLUCOSE SERPL-MCNC: 197 MG/DL (ref 74–99)
HCO3: 23.9 MMOL/L (ref 22–26)
HCT VFR BLD AUTO: 24.2 % (ref 37–54)
HCT VFR BLD AUTO: 28 % (ref 37–54)
HGB BLD-MCNC: 6.7 G/DL (ref 12.5–16.5)
HGB BLD-MCNC: 8 G/DL (ref 12.5–16.5)
HHB: 6.4 % (ref 0–5)
IRON SATN MFR SERPL: 9 % (ref 20–55)
IRON SERPL-MCNC: 13 UG/DL (ref 59–158)
LAB: ABNORMAL
LYMPHOCYTES NFR BLD: 0.56 K/UL (ref 1.5–4)
LYMPHOCYTES RELATIVE PERCENT: 4 % (ref 20–42)
Lab: 441
MAGNESIUM SERPL-MCNC: 2 MG/DL (ref 1.6–2.6)
MCH RBC QN AUTO: 20.4 PG (ref 26–35)
MCHC RBC AUTO-ENTMCNC: 27.7 G/DL (ref 32–34.5)
MCV RBC AUTO: 73.8 FL (ref 80–99.9)
METHB: 0.4 % (ref 0–1.5)
MICROORGANISM SPEC CULT: ABNORMAL
MICROORGANISM/AGENT SPEC: ABNORMAL
MODE: AC
MONOCYTES NFR BLD: 0 % (ref 2–12)
MONOCYTES NFR BLD: 0 K/UL (ref 0.1–0.95)
NEUTROPHILS NFR BLD: 96 % (ref 43–80)
NEUTS SEG NFR BLD: 15.39 K/UL (ref 1.8–7.3)
NUCLEATED RED BLOOD CELLS: 1 PER 100 WBC
O2 CONTENT: 10.4 ML/DL
O2 SATURATION: 93.5 % (ref 92–98.5)
O2HB: 91.7 % (ref 94–97)
OPERATOR ID: 2067
PATIENT TEMP: 37 C
PCO2: 39.8 MMHG (ref 35–45)
PEEP/CPAP: 8 CMH2O
PFO2: 1.58 MMHG/%
PH BLOOD GAS: 7.4 (ref 7.35–7.45)
PHOSPHATE SERPL-MCNC: 2.4 MG/DL (ref 2.5–4.5)
PLATELET # BLD AUTO: 300 K/UL (ref 130–450)
PMV BLD AUTO: 10.1 FL (ref 7–12)
PO2: 71.1 MMHG (ref 75–100)
POTASSIUM SERPL-SCNC: 4.5 MMOL/L (ref 3.5–5)
RBC # BLD AUTO: 3.28 M/UL (ref 3.8–5.8)
RBC # BLD: ABNORMAL 10*6/UL
RI(T): 2.88
RR MECHANICAL: 20 B/MIN
SERVICE CMNT-IMP: ABNORMAL
SODIUM SERPL-SCNC: 146 MMOL/L (ref 132–146)
SOURCE, BLOOD GAS: ABNORMAL
SPECIMEN DESCRIPTION: ABNORMAL
THB: 8 G/DL (ref 11.5–16.5)
TIBC SERPL-MCNC: 147 UG/DL (ref 250–450)
TIME ANALYZED: 453
TME LAST DOSE: NORMAL H
VANCOMYCIN DOSE: NORMAL MG
VANCOMYCIN SERPL-MCNC: 11.1 UG/ML (ref 5–40)
VIT B12 SERPL-MCNC: 739 PG/ML (ref 211–946)
VT MECHANICAL: 450 ML
WBC # BLD: ABNORMAL 10*3/UL
WBC OTHER # BLD: 16.1 K/UL (ref 4.5–11.5)

## 2024-04-14 PROCEDURE — 83540 ASSAY OF IRON: CPT

## 2024-04-14 PROCEDURE — 6370000000 HC RX 637 (ALT 250 FOR IP): Performed by: INTERNAL MEDICINE

## 2024-04-14 PROCEDURE — 82805 BLOOD GASES W/O2 SATURATION: CPT

## 2024-04-14 PROCEDURE — 86923 COMPATIBILITY TEST ELECTRIC: CPT

## 2024-04-14 PROCEDURE — 83550 IRON BINDING TEST: CPT

## 2024-04-14 PROCEDURE — 71045 X-RAY EXAM CHEST 1 VIEW: CPT

## 2024-04-14 PROCEDURE — 82728 ASSAY OF FERRITIN: CPT

## 2024-04-14 PROCEDURE — P9016 RBC LEUKOCYTES REDUCED: HCPCS

## 2024-04-14 PROCEDURE — 82607 VITAMIN B-12: CPT

## 2024-04-14 PROCEDURE — 82746 ASSAY OF FOLIC ACID SERUM: CPT

## 2024-04-14 PROCEDURE — 85025 COMPLETE CBC W/AUTO DIFF WBC: CPT

## 2024-04-14 PROCEDURE — 85018 HEMOGLOBIN: CPT

## 2024-04-14 PROCEDURE — C9113 INJ PANTOPRAZOLE SODIUM, VIA: HCPCS

## 2024-04-14 PROCEDURE — 2580000003 HC RX 258: Performed by: INTERNAL MEDICINE

## 2024-04-14 PROCEDURE — 94640 AIRWAY INHALATION TREATMENT: CPT

## 2024-04-14 PROCEDURE — 2580000003 HC RX 258

## 2024-04-14 PROCEDURE — 94003 VENT MGMT INPAT SUBQ DAY: CPT

## 2024-04-14 PROCEDURE — 83735 ASSAY OF MAGNESIUM: CPT

## 2024-04-14 PROCEDURE — 6360000002 HC RX W HCPCS: Performed by: INTERNAL MEDICINE

## 2024-04-14 PROCEDURE — 84100 ASSAY OF PHOSPHORUS: CPT

## 2024-04-14 PROCEDURE — 6360000002 HC RX W HCPCS

## 2024-04-14 PROCEDURE — 2580000003 HC RX 258: Performed by: STUDENT IN AN ORGANIZED HEALTH CARE EDUCATION/TRAINING PROGRAM

## 2024-04-14 PROCEDURE — A4216 STERILE WATER/SALINE, 10 ML: HCPCS

## 2024-04-14 PROCEDURE — 99291 CRITICAL CARE FIRST HOUR: CPT | Performed by: INTERNAL MEDICINE

## 2024-04-14 PROCEDURE — 85014 HEMATOCRIT: CPT

## 2024-04-14 PROCEDURE — 86850 RBC ANTIBODY SCREEN: CPT

## 2024-04-14 PROCEDURE — 80202 ASSAY OF VANCOMYCIN: CPT

## 2024-04-14 PROCEDURE — 2000000000 HC ICU R&B

## 2024-04-14 PROCEDURE — 2500000003 HC RX 250 WO HCPCS: Performed by: INTERNAL MEDICINE

## 2024-04-14 PROCEDURE — 99222 1ST HOSP IP/OBS MODERATE 55: CPT | Performed by: NURSE PRACTITIONER

## 2024-04-14 PROCEDURE — 86901 BLOOD TYPING SEROLOGIC RH(D): CPT

## 2024-04-14 PROCEDURE — 80048 BASIC METABOLIC PNL TOTAL CA: CPT

## 2024-04-14 PROCEDURE — 36430 TRANSFUSION BLD/BLD COMPNT: CPT

## 2024-04-14 PROCEDURE — 86900 BLOOD TYPING SEROLOGIC ABO: CPT

## 2024-04-14 RX ORDER — SODIUM CHLORIDE 9 MG/ML
INJECTION, SOLUTION INTRAVENOUS PRN
Status: DISCONTINUED | OUTPATIENT
Start: 2024-04-14 | End: 2024-04-20

## 2024-04-14 RX ORDER — MIDAZOLAM HYDROCHLORIDE 1 MG/ML
INJECTION INTRAMUSCULAR; INTRAVENOUS
Status: COMPLETED
Start: 2024-04-14 | End: 2024-04-14

## 2024-04-14 RX ORDER — MIDAZOLAM HYDROCHLORIDE 2 MG/2ML
2 INJECTION, SOLUTION INTRAMUSCULAR; INTRAVENOUS ONCE
Status: COMPLETED | OUTPATIENT
Start: 2024-04-14 | End: 2024-04-14

## 2024-04-14 RX ORDER — MIDAZOLAM HYDROCHLORIDE 2 MG/2ML
2 INJECTION, SOLUTION INTRAMUSCULAR; INTRAVENOUS EVERY 4 HOURS PRN
Status: DISCONTINUED | OUTPATIENT
Start: 2024-04-14 | End: 2024-04-21 | Stop reason: HOSPADM

## 2024-04-14 RX ADMIN — VANCOMYCIN HYDROCHLORIDE 1500 MG: 10 INJECTION, POWDER, LYOPHILIZED, FOR SOLUTION INTRAVENOUS at 09:12

## 2024-04-14 RX ADMIN — DEXTROSE MONOHYDRATE: 50 INJECTION, SOLUTION INTRAVENOUS at 02:02

## 2024-04-14 RX ADMIN — ANTI-FUNGAL POWDER MICONAZOLE NITRATE TALC FREE: 1.42 POWDER TOPICAL at 08:55

## 2024-04-14 RX ADMIN — IPRATROPIUM BROMIDE 0.5 MG: 0.5 SOLUTION RESPIRATORY (INHALATION) at 10:29

## 2024-04-14 RX ADMIN — SODIUM PHOSPHATE, MONOBASIC, MONOHYDRATE AND SODIUM PHOSPHATE, DIBASIC, ANHYDROUS 15 MMOL: 142; 276 INJECTION, SOLUTION INTRAVENOUS at 11:16

## 2024-04-14 RX ADMIN — Medication 125 MCG/HR: at 19:31

## 2024-04-14 RX ADMIN — MIDAZOLAM HYDROCHLORIDE 2 MG: 2 INJECTION, SOLUTION INTRAMUSCULAR; INTRAVENOUS at 23:27

## 2024-04-14 RX ADMIN — DEXAMETHASONE SODIUM PHOSPHATE 6 MG: 10 INJECTION, SOLUTION INTRAMUSCULAR; INTRAVENOUS at 08:56

## 2024-04-14 RX ADMIN — MIDAZOLAM 2 MG: 1 INJECTION INTRAMUSCULAR; INTRAVENOUS at 23:27

## 2024-04-14 RX ADMIN — IPRATROPIUM BROMIDE 0.5 MG: 0.5 SOLUTION RESPIRATORY (INHALATION) at 08:04

## 2024-04-14 RX ADMIN — Medication 100 MCG/HR: at 11:50

## 2024-04-14 RX ADMIN — CHLORHEXIDINE GLUCONATE 15 ML: 1.2 RINSE ORAL at 20:36

## 2024-04-14 RX ADMIN — CHLORHEXIDINE GLUCONATE 15 ML: 1.2 RINSE ORAL at 08:55

## 2024-04-14 RX ADMIN — ATORVASTATIN CALCIUM 40 MG: 40 TABLET, FILM COATED ORAL at 08:55

## 2024-04-14 RX ADMIN — SODIUM CHLORIDE, PRESERVATIVE FREE 40 MG: 5 INJECTION INTRAVENOUS at 08:56

## 2024-04-14 RX ADMIN — SODIUM CHLORIDE, PRESERVATIVE FREE 40 MG: 5 INJECTION INTRAVENOUS at 20:36

## 2024-04-14 RX ADMIN — Medication 200 MCG/HR: at 00:07

## 2024-04-14 RX ADMIN — ANTI-FUNGAL POWDER MICONAZOLE NITRATE TALC FREE: 1.42 POWDER TOPICAL at 20:36

## 2024-04-14 RX ADMIN — PIPERACILLIN AND TAZOBACTAM 4500 MG: 4; .5 INJECTION, POWDER, FOR SOLUTION INTRAVENOUS at 00:09

## 2024-04-14 RX ADMIN — BUDESONIDE 250 MCG: 0.25 INHALANT RESPIRATORY (INHALATION) at 20:12

## 2024-04-14 RX ADMIN — Medication 200 MCG/HR: at 05:25

## 2024-04-14 RX ADMIN — PIPERACILLIN AND TAZOBACTAM 4500 MG: 4; .5 INJECTION, POWDER, FOR SOLUTION INTRAVENOUS at 09:09

## 2024-04-14 RX ADMIN — IPRATROPIUM BROMIDE 0.5 MG: 0.5 SOLUTION RESPIRATORY (INHALATION) at 20:13

## 2024-04-14 RX ADMIN — BUDESONIDE 250 MCG: 0.25 INHALANT RESPIRATORY (INHALATION) at 08:04

## 2024-04-14 RX ADMIN — GUAIFENESIN 400 MG: 400 TABLET ORAL at 20:36

## 2024-04-14 RX ADMIN — ISOSORBIDE MONONITRATE 60 MG: 30 TABLET, EXTENDED RELEASE ORAL at 08:55

## 2024-04-14 RX ADMIN — IPRATROPIUM BROMIDE 0.5 MG: 0.5 SOLUTION RESPIRATORY (INHALATION) at 15:54

## 2024-04-14 RX ADMIN — GUAIFENESIN 400 MG: 400 TABLET ORAL at 13:40

## 2024-04-14 RX ADMIN — PIPERACILLIN AND TAZOBACTAM 4500 MG: 4; .5 INJECTION, POWDER, FOR SOLUTION INTRAVENOUS at 16:56

## 2024-04-14 RX ADMIN — GUAIFENESIN 400 MG: 400 TABLET ORAL at 08:55

## 2024-04-14 ASSESSMENT — PULMONARY FUNCTION TESTS
PIF_VALUE: 26
PIF_VALUE: 28
PIF_VALUE: 31
PIF_VALUE: 33
PIF_VALUE: 28
PIF_VALUE: 27
PIF_VALUE: 35
PIF_VALUE: 29
PIF_VALUE: 27
PIF_VALUE: 27
PIF_VALUE: 34
PIF_VALUE: 29
PIF_VALUE: 27
PIF_VALUE: 27
PIF_VALUE: 29
PIF_VALUE: 27
PIF_VALUE: 28
PIF_VALUE: 27
PIF_VALUE: 28
PIF_VALUE: 27
PIF_VALUE: 27
PIF_VALUE: 29
PIF_VALUE: 50
PIF_VALUE: 26
PIF_VALUE: 26
PIF_VALUE: 27
PIF_VALUE: 46
PIF_VALUE: 29

## 2024-04-14 NOTE — PROGRESS NOTES
Chris Brown M.D.  Ish Lagunas D.O.  Ansley Everett M.D.  Mary Alice Chavez M.D.   Noel Peters D.O.          Daily Pulmonary Progress Note    Patient:  Virgilio Sanchez 83 y.o. male MRN: [id number]     Date of Service: 4/14/2024        Subjective      Patient was seen and examined.    Transferred to ICU yesterday and intubated.  Remains on ventilator with sedation.    Objective   Vitals: BP (!) 113/56   Pulse 76   Temp 98.4 °F (36.9 °C) (Temporal)   Resp 21   Ht 1.829 m (6')   Wt 74 kg (163 lb 2.3 oz)   SpO2 93%   BMI 22.13 kg/m²     I/O:    Intake/Output Summary (Last 24 hours) at 4/14/2024 1350  Last data filed at 4/14/2024 0921  Gross per 24 hour   Intake 1620.92 ml   Output 925 ml   Net 695.92 ml       CURRENT MEDS :  Scheduled Meds:   pantoprazole (PROTONIX) 40 mg in sodium chloride (PF) 0.9 % 10 mL injection  40 mg IntraVENous Q12H    sodium phosphate IVPB (PERIPHERAL line)  15 mmol IntraVENous Once    piperacillin-tazobactam  4,500 mg IntraVENous Q8H    chlorhexidine  15 mL Mouth/Throat BID    [Held by provider] sodium zirconium cyclosilicate  10 g Oral Daily    dexAMETHasone  6 mg IntraVENous Daily    vancomycin (VANCOCIN) intermittent dosing (placeholder)   Other RX Placeholder    miconazole   Topical BID    [Held by provider] amLODIPine  5 mg Oral Daily    [Held by provider] aspirin  81 mg Oral Daily    guaiFENesin  400 mg Oral TID    isosorbide mononitrate  60 mg Oral Daily    [Held by provider] lisinopril  10 mg Oral Daily    [Held by provider] metoprolol tartrate  50 mg Oral BID    atorvastatin  40 mg Oral Daily    [Held by provider] enoxaparin  40 mg SubCUTAneous Daily    budesonide  0.25 mg Nebulization BID RT    And    ipratropium  0.5 mg Nebulization 4x Daily RT       Continuous Infusions:   sodium chloride      dextrose 75 mL/hr at 04/14/24 0202    fentaNYL 100 mcg/hr (04/14/24 1150)    ketamine (KETALAR) 500 mg in sodium chloride 0.9 % 100 mL infusion 0.25 mg/kg/hr (04/14/24 0008)

## 2024-04-14 NOTE — FLOWSHEET NOTE
Patient reaches for lines and tubes when unrestrained. Patient is unable to follow commands or be redirected at this time. Restraints continued for patient safety.

## 2024-04-14 NOTE — PLAN OF CARE
Problem: Safety - Adult  Goal: Free from fall injury  Outcome: Progressing     Problem: Discharge Planning  Goal: Discharge to home or other facility with appropriate resources  Outcome: Not Progressing     Problem: Skin/Tissue Integrity  Goal: Absence of new skin breakdown  Description: 1.  Monitor for areas of redness and/or skin breakdown  2.  Assess vascular access sites hourly  3.  Every 4-6 hours minimum:  Change oxygen saturation probe site  4.  Every 4-6 hours:  If on nasal continuous positive airway pressure, respiratory therapy assess nares and determine need for appliance change or resting period.  Outcome: Progressing     Problem: Confusion  Goal: Confusion, delirium, dementia, or psychosis is improved or at baseline  Description: INTERVENTIONS:  1. Assess for possible contributors to thought disturbance, including medications, impaired vision or hearing, underlying metabolic abnormalities, dehydration, psychiatric diagnoses, and notify attending LIP  2. [place] high risk fall precautions, as indicated  3. Provide frequent short contacts to provide reality reorientation, refocusing and direction  4. Decrease environmental stimuli, including noise as appropriate  5. Monitor and intervene to maintain adequate nutrition, hydration, elimination, sleep and activity  6. If unable to ensure safety without constant attention obtain sitter and review sitter guidelines with assigned personnel  7. Initiate Psychosocial CNS and Spiritual Care consult, as indicated  Outcome: Not Progressing     Problem: Safety - Medical Restraint  Goal: Remains free of injury from restraints (Restraint for Interference with Medical Device)  Description: INTERVENTIONS:  1. Determine that other, less restrictive measures have been tried or would not be effective before applying the restraint  2. Evaluate the patient's condition at the time of restraint application  3. Inform patient/family regarding the reason for restraint  4.

## 2024-04-14 NOTE — PROGRESS NOTES
Sedated on vent   Will respond to my voice with head nods  RRR  Diminished breath sounds   Continue all aggressive measures   Discussed everything again with his wife Janet

## 2024-04-14 NOTE — PROGRESS NOTES
left upper lobe peripherally, likely pneumonia. Follow-up to resolution recommended.  Infiltrate new when compared to the previous study.  This may represent pneumonia although pulmonary infarct or pleural based mass cannot be entirely excluded.  This finding could be further evaluated with contrast-enhanced CT as clinically indicated.       CBC with Differential:    Lab Results   Component Value Date/Time    WBC 16.1 04/14/2024 04:09 AM    RBC 3.28 04/14/2024 04:09 AM    HGB 8.0 04/14/2024 11:07 AM    HCT 28.0 04/14/2024 11:07 AM     04/14/2024 04:09 AM    MCV 73.8 04/14/2024 04:09 AM    MCH 20.4 04/14/2024 04:09 AM    MCHC 27.7 04/14/2024 04:09 AM    RDW 19.2 04/14/2024 04:09 AM    NRBC 1 04/14/2024 04:09 AM    METASPCT 0.9 03/05/2018 04:47 AM    LYMPHOPCT 4 04/14/2024 04:09 AM    MONOPCT 0 04/14/2024 04:09 AM    MYELOPCT 3.6 03/08/2018 05:20 AM    BASOPCT 1 04/14/2024 04:09 AM    MONOSABS 0.00 04/14/2024 04:09 AM    LYMPHSABS 0.56 04/14/2024 04:09 AM    EOSABS 0.00 04/14/2024 04:09 AM    BASOSABS 0.14 04/14/2024 04:09 AM     CMP:    Lab Results   Component Value Date/Time     04/14/2024 04:09 AM    K 4.5 04/14/2024 04:09 AM    K 3.9 03/09/2018 05:10 AM     04/14/2024 04:09 AM    CO2 24 04/14/2024 04:09 AM    BUN 42 04/14/2024 04:09 AM    CREATININE 1.5 04/14/2024 04:09 AM    GFRAA >60 03/14/2018 06:17 AM    LABGLOM 45 04/14/2024 04:09 AM    GLUCOSE 197 04/14/2024 04:09 AM    PROT 7.8 04/12/2024 05:40 AM    LABALBU 2.8 04/12/2024 05:40 AM    CALCIUM 8.0 04/14/2024 04:09 AM    BILITOT 0.5 04/12/2024 05:40 AM    ALKPHOS 198 04/12/2024 05:40 AM    AST 28 04/12/2024 05:40 AM    ALT 17 04/12/2024 05:40 AM       CLINICAL ASSESMENT & PLAN:    Acute hypoxic hypercapnic respiratory failure  Left upper lobe pneumonia  COPD exacerbation     Sepsis  GPC bacteremia     Acute metabolic encephalopathy     CAD / stent  Ischemic cardiomyopathy EF 45     Hyponatremia/hyperkalemia/acute kidney injury     Intubated  4/13  Mechanical ventilatory support  Lung protective ventilation  Daily SAT/SBT  Monitor ABG     Bronchodilators, Pulmicort  Steroids     Blood culture, respiratory cultures pending  Empiric vancomycin, Zosyn     Replace electrolytes.    Monitor BMP     Aspirin, statin, metoprolol, Imdur, amlodipine     Tube feeding started  Free water flushes     PPI prophylaxis     Subcu Lovenox     Galvan placed 4/13 for accurate I's and O's     PIV        Case was discussed with care team.    This patient is unstable and critically ill. There are life and organ supporting interventions that require frequent monitoring and personal assessment. There is a high possibility of sudden, clinically significant or life-threatening deterioration in this patient's condition which may require prompt therapeutic interventions.   I spent 37 independent minutes of critical care time excluding procedures.      Ranjana Gonzalez, DO

## 2024-04-14 NOTE — PROGRESS NOTES
Pharmacy Consultation Note  (Antibiotic Dosing and Monitoring)    Initial consult date: 4/13/24  Consulting physician/provider: Dr. Espana  Drug: Vancomycin  Indication: Bacteremia    Age/  Gender Height Weight IBW  Allergy Information   83 y.o./male 182.9 cm (6') 74 kg (163 lb 2.3 oz)     Ideal body weight: 77.6 kg (171 lb 1.2 oz)   Patient has no known allergies.      Renal Function:  Recent Labs     04/13/24  0543 04/13/24  1501 04/14/24  0409   BUN 48* 42* 42*   CREATININE 2.0* 1.7* 1.5*         Intake/Output Summary (Last 24 hours) at 4/14/2024 0742  Last data filed at 4/14/2024 0500  Gross per 24 hour   Intake 1270.92 ml   Output 800 ml   Net 470.92 ml         Vancomycin Monitoring:  Trough:  No results for input(s): \"VANCOTROUGH\" in the last 72 hours.  Random:    Recent Labs     04/14/24  0409   VANCORANDOM 11.1       Vancomycin Administration Times:  Recent vancomycin administrations        No vancomycin IV orders with administrations found.            Orders not given:            vancomycin (VANCOCIN) 1500 mg in sodium chloride 0.9 % 250 mL IVPB    vancomycin (VANCOCIN) intermittent dosing (placeholder)                    Assessment:  Patient is a 83 y.o. male who has been initiated on vancomycin  Estimated Creatinine Clearance: 40 mL/min (A) (based on SCr of 1.5 mg/dL (H)).    Plan:  Will continue vancomycin 1500 mg IV once  Random tomorrow AM    Juany Cook, PharmD, BCPS, BCCCP 4/14/2024 7:42 AM

## 2024-04-14 NOTE — CONSULTS
Palliative Care Department  [phone]  Palliative Care Initial Consult  Provider Karen Mcnair, APRN - CNP     Virgilio Sanchez  [id number]  Hospital Day: 3  Date of Initial Consult: 4/13/2024  Referring Provider:  Noel Peters DO  Palliative Medicine was consulted for assistance with: goals of care, code status discussion    HPI:   Virgilio Sanchez is a 83 y.o. with a medical history of severe COPD, chronic hypoxic respiratory failure, coronary artery disease disease with cardiac stenting, nicotine dependence in remission since 2018 with 50-pack-year history of smoking, DVT valvular heart disease, nonrheumatic aortic valve stenosis, hypertension hyperlipidemia prior alcohol abuse recovering alcoholic with a massive GI bleed requiring embolization of the gastroduodenal artery in 2018 who was admitted on 4/11/2024 from home with a CHIEF COMPLAINT of SOB, AMS. Was seen by pulmonology, placed initially on noninvasive ventilation/AVAPS.  However due to lack of improvement clinically and on blood gas, patient was transferred to ICU for consideration of intubation and mechanical ventilation. Chest x-ray showed left upper lobe airspace disease. Nephrology following. Palliative medicine consulted for further assistance.     ASSESSMENT/PLAN:     Pertinent Hospital Diagnoses     Acute hypoxic hypercapnic respiratory failure  Left upper lobe pneumonia  COPD exacerbation   Sepsis  GPC bacteremia   Acute metabolic encephalopathy   CAD / stent  Ischemic cardiomyopathy EF 45   Hyponatremia/hyperkalemia/acute kidney injury      Palliative Care Encounter / Counseling Regarding Goals of Care  Please see detailed goals of care discussion as below  At this time, Virgilio Sanchez, Does Not have capacity for medical decision-making.  Capacity is time limited and situation/question specific  During encounter Janet was surrogate medical decision-maker  Outcome of goals of care meeting:   Continue current medical management  Discussed CODE  STATUS options  Code status Full Code  Advanced Directives: no POA or living will in epic  Surrogate/Legal NOK:  Janet Sanchez (spouse) [phone]    Spiritual assessment: no spiritual distress identified  Bereavement and grief: to be determined  Referrals to: none today  SUBJECTIVE:     Current medical issues leading to Palliative Medicine involvement include   Active Hospital Problems    Diagnosis Date Noted    Pneumonia due to infectious organism [J18.9] 04/11/2024    Acute on chronic respiratory failure (HCC) [J96.20] 04/11/2024       Details of Conversation:    Chart reviewed.  Patient seen at the bedside, intubated, sedated.  Patient unable to partake in meaningful conversation and unable to make decisions for himself.  Patient's spouse, Janet, present at the bedside.  Introduced self and the role of palliative medicine.  Discussed patient's current condition and comorbidities.  Discussed goals of care and CODE STATUS options.  Janet states that the patient would not want resuscitation in the event of cardiac arrest.  She is in agreement with changing CODE STATUS to DNR CCA.  She would like to continue current medical management and monitor patient's clinical progression.  She is hopeful that patient will have improvements and be able to be extubated soon.  Support provided.  All questions and concerns addressed.  Palliative medicine will continue to follow.    OBJECTIVE:   Prognosis: Guarded    Physical Exam:  BP (!) 104/55   Pulse 82   Temp 97.3 °F (36.3 °C) (Temporal)   Resp 22   Ht 1.829 m (6')   Wt 74 kg (163 lb 2.3 oz)   SpO2 95%   BMI 22.13 kg/m²   Constitutional:  Elderly, intubated, sedated  Eyes: no scleral icterus, normal lids, no discharge  ENMT:  Normocephalic, atraumatic, mucosa moist, EOMI  Neck:  trachea midline, no JVD  Lungs: Rhonchi  Heart::  RRR  Abd:  Soft, non tender, rounded, bowel sounds present  Ext:  Moving all extremities, +edema, pulses present  Skin:  Warm and dry, impaired

## 2024-04-14 NOTE — PROGRESS NOTES
Nephrology Progress Note  [facility]      CC:       HPI:   The patient is an 83 year old male with a past medical history that includes COPD, duodenal ulcer, CAD with cardiac stenting, HTN, HLD, valvular heart disease, DVT, arthritis. The patient presented to the ED on 4/11 with c/o shortness of breath with cough and fever that started a few days ago. The patient's VS in the ED were /67  Pulse 98  Temp 98.6 °F (37 °C) (Axillary)  Resp 25  SpO2 97% . His initial labs in the ED were significant for K+ 6.2, CO@ 23, BUN 42, Cr 1.9, GFR 35, mg 1.7, PO4 7.7, BNP 5441, troponin 105, WBC 26, hgb 8.2. The patient's CXR noted right airspace density likely pneumonia. The patient received treatment for hyperkalemia and received ceftriaxone and doxycycline. The patient was found unresponsive upon arrival to the sixth floor from the ED and an RRT was called and the patient was found to have hypercapneic ABG results and was placed on AVAPS. Upon assessment today the patient is easily aroused and attempts to answer questions but is disoriented and difficult to understand. Remains on NIV.     Interval History:    4/14: Seen and examined.  Patient was transferred to the medical intensive care unit last evening and was intubated due to persistent hypercarbia.  Making acceptable urine output.  Sedated.  Discussed with wife and nurse at bedside.  No further events this morning.      PMH:          Past Medical History:   Diagnosis Date    Arthritis     CAD (coronary artery disease)     COPD (chronic obstructive pulmonary disease) (HCC)     Duodenal ulcer 4/12/2018    Hyperlipidemia     Hypertension     Valvular heart disease        Patient Active Problem List   Diagnosis    HCAP (healthcare-associated pneumonia)    Melena    Rectal bleeding    Acute blood loss anemia    Duodenal ulcer    Aortic valve disease    Chronic obstructive lung disease (HCC)    Benign essential hypertension    Asymptomatic coronary heart disease     Pure hypercholesterolemia    Acute respiratory failure (HCC)    Acute hypoxic respiratory failure (HCC)    Acute anemia    Generalized edema    SOB (shortness of breath)    Other and unspecified hyperlipidemia    Chronic ischemic heart disease    Pneumonia due to infectious organism    Acute on chronic respiratory failure (HCC)    Palliative care encounter    Moderate protein-calorie malnutrition (HCC)       Meds:     pantoprazole (PROTONIX) 40 mg in sodium chloride (PF) 0.9 % 10 mL injection  40 mg IntraVENous Q12H    sodium phosphate IVPB (PERIPHERAL line)  15 mmol IntraVENous Once    piperacillin-tazobactam  4,500 mg IntraVENous Q8H    chlorhexidine  15 mL Mouth/Throat BID    [Held by provider] sodium zirconium cyclosilicate  10 g Oral Daily    dexAMETHasone  6 mg IntraVENous Daily    vancomycin (VANCOCIN) intermittent dosing (placeholder)   Other RX Placeholder    miconazole   Topical BID    [Held by provider] amLODIPine  5 mg Oral Daily    [Held by provider] aspirin  81 mg Oral Daily    guaiFENesin  400 mg Oral TID    isosorbide mononitrate  60 mg Oral Daily    [Held by provider] lisinopril  10 mg Oral Daily    [Held by provider] metoprolol tartrate  50 mg Oral BID    atorvastatin  40 mg Oral Daily    [Held by provider] enoxaparin  40 mg SubCUTAneous Daily    budesonide  0.25 mg Nebulization BID RT    And    ipratropium  0.5 mg Nebulization 4x Daily RT        sodium chloride      dextrose 75 mL/hr at 04/14/24 0202    fentaNYL 100 mcg/hr (04/14/24 1150)    ketamine (KETALAR) 500 mg in sodium chloride 0.9 % 100 mL infusion 0.25 mg/kg/hr (04/14/24 0008)    dextrose         Meds prn:     sodium chloride, glucose, dextrose bolus **OR** dextrose bolus, glucagon (rDNA), dextrose, albuterol, acetaminophen    Meds prior to admission:     No current facility-administered medications on file prior to encounter.     Current Outpatient Medications on File Prior to Encounter   Medication Sig Dispense Refill    metoprolol

## 2024-04-14 NOTE — PROGRESS NOTES
Comprehensive Nutrition Assessment    Type and Reason for Visit:  Initial, Positive Nutrition Screen (new TF)    Nutrition Recommendations/Plan:   Continue NPO, Modify Tube Feeding     renal formula not indicated at this time, current hypophosphatemia & K+ WNL    TF rec: Diabetic (glucerna 1.5) @ 50 ml/hr   provides 1200 ml tv, 1800kcals, 99gm pro, 911 ml free water    Pt is at high risk for refeeding syndrome. Monitor/replace electrolytes prn       Malnutrition Assessment:  Malnutrition Status:  Moderate malnutrition (04/14/24 1134)    Context:  Chronic Illness     Findings of the 6 clinical characteristics of malnutrition:  Energy Intake:  75% or less estimated energy requirements for 1 month or longer  Weight Loss:  No significant weight loss     Body Fat Loss:  Mild body fat loss Orbital   Muscle Mass Loss:  Mild muscle mass loss Temples (temporalis), Clavicles (pectoralis & deltoids)  Fluid Accumulation:  No significant fluid accumulation     Strength:  Not Performed    Nutrition Assessment:    Pt admit w/ AMS & acute resp failure requiring bipap. Noted DEIDRE and anemia, PNA, COPD exacerbation & sepsis w/ transfer to MICU 4/13 intubated/sedated w/ need for EN support. PMHx CAD, HLD, malnutrition, remote hx ETOH abuse. Pt meets criteria for moderate malnutrition. Will provide updated TF recs and monitor.    Nutrition Related Findings:    pt intubated/sedated, NGT clamped, active BS, soft abd, fluids WDL, hypophosphatemia, hyperglycemia, MAP WNL Wound Type: None       Current Nutrition Intake & Therapies:    Average Meal Intake: NPO     Diet NPO  ADULT TUBE FEEDING; Orogastric; Renal Formula; Continuous; 10; Yes; 10; Q 8 hours; 40; 100; Q 4 hours  Current Tube Feeding (TF) Orders:  Feeding Route: Nasogastric (clamped/TF not yet hanging)  Formula: Renal Formula  Schedule: Continuous  Feeding Regimen: 40 ml/hr  Additives/Modulars: None  Water Flushes: 100 ml q 4 hrs = 600 ml water  Goal TF & Flush Orders

## 2024-04-14 NOTE — FLOWSHEET NOTE
Patient is intermittently agitated. Patient is unable to follow commands at this time, or be redirected. Patient reaches for IV and ETT while unrestrained. Restraints continued for patient safety.

## 2024-04-15 ENCOUNTER — APPOINTMENT (OUTPATIENT)
Age: 84
End: 2024-04-15
Attending: INTERNAL MEDICINE
Payer: MEDICARE

## 2024-04-15 LAB
AADO2: 167.8 MMHG
ABO/RH: NORMAL
ANION GAP SERPL CALCULATED.3IONS-SCNC: 11 MMOL/L (ref 7–16)
ANTIBODY SCREEN: NEGATIVE
ARM BAND NUMBER: NORMAL
B.E.: -2.7 MMOL/L (ref -3–3)
BLOOD BANK BLOOD PRODUCT EXPIRATION DATE: NORMAL
BLOOD BANK DISPENSE STATUS: NORMAL
BLOOD BANK ISBT PRODUCT BLOOD TYPE: 6200
BLOOD BANK PRODUCT CODE: NORMAL
BLOOD BANK SAMPLE EXPIRATION: NORMAL
BLOOD BANK UNIT TYPE AND RH: NORMAL
BPU ID: NORMAL
BUN SERPL-MCNC: 42 MG/DL (ref 6–23)
CALCIUM SERPL-MCNC: 7.9 MG/DL (ref 8.6–10.2)
CHLORIDE SERPL-SCNC: 106 MMOL/L (ref 98–107)
CO2 SERPL-SCNC: 24 MMOL/L (ref 22–29)
COHB: 1.3 % (ref 0–1.5)
COMPONENT: NORMAL
CREAT SERPL-MCNC: 1.6 MG/DL (ref 0.7–1.2)
CRITICAL: ABNORMAL
CROSSMATCH RESULT: NORMAL
DATE ANALYZED: ABNORMAL
DATE LAST DOSE: NORMAL
DATE OF COLLECTION: ABNORMAL
ECHO AO ASC DIAM: 3 CM
ECHO AO ASCENDING AORTA INDEX: 1.54 CM/M2
ECHO AR MAX VEL PISA: 3.1 M/S
ECHO AV AREA PEAK VELOCITY: 1.1 CM2
ECHO AV AREA VTI: 1 CM2
ECHO AV AREA/BSA PEAK VELOCITY: 0.6 CM2/M2
ECHO AV AREA/BSA VTI: 0.5 CM2/M2
ECHO AV CUSP MM: 1.2 CM
ECHO AV MEAN GRADIENT: 19 MMHG
ECHO AV MEAN VELOCITY: 2.1 M/S
ECHO AV PEAK GRADIENT: 37 MMHG
ECHO AV PEAK VELOCITY: 3 M/S
ECHO AV REGURGITANT PHT: 600.1 MILLISECOND
ECHO AV VELOCITY RATIO: 0.37
ECHO AV VTI: 62.7 CM
ECHO EST RA PRESSURE: 15 MMHG
ECHO LA DIAMETER INDEX: 1.74 CM/M2
ECHO LA DIAMETER: 3.4 CM
ECHO LA VOL A-L A2C: 39 ML (ref 18–58)
ECHO LA VOL A-L A4C: 44 ML (ref 18–58)
ECHO LA VOL MOD A2C: 36 ML (ref 18–58)
ECHO LA VOL MOD A4C: 41 ML (ref 18–58)
ECHO LA VOLUME AREA LENGTH: 41 ML
ECHO LA VOLUME INDEX A-L A2C: 20 ML/M2 (ref 16–34)
ECHO LA VOLUME INDEX A-L A4C: 23 ML/M2 (ref 16–34)
ECHO LA VOLUME INDEX AREA LENGTH: 21 ML/M2 (ref 16–34)
ECHO LA VOLUME INDEX MOD A2C: 18 ML/M2 (ref 16–34)
ECHO LA VOLUME INDEX MOD A4C: 21 ML/M2 (ref 16–34)
ECHO LV EDV A2C: 86 ML
ECHO LV EDV A4C: 96 ML
ECHO LV EDV BP: 92 ML (ref 67–155)
ECHO LV EDV INDEX A4C: 49 ML/M2
ECHO LV EDV INDEX BP: 47 ML/M2
ECHO LV EDV NDEX A2C: 44 ML/M2
ECHO LV EJECTION FRACTION A2C: 43 %
ECHO LV EJECTION FRACTION A4C: 51 %
ECHO LV EJECTION FRACTION BIPLANE: 47 % (ref 55–100)
ECHO LV ESV A2C: 49 ML
ECHO LV ESV A4C: 47 ML
ECHO LV ESV BP: 49 ML (ref 22–58)
ECHO LV ESV INDEX A2C: 25 ML/M2
ECHO LV ESV INDEX A4C: 24 ML/M2
ECHO LV ESV INDEX BP: 25 ML/M2
ECHO LV FRACTIONAL SHORTENING: 23 % (ref 28–44)
ECHO LV INTERNAL DIMENSION DIASTOLE INDEX: 2.72 CM/M2
ECHO LV INTERNAL DIMENSION DIASTOLIC: 5.3 CM (ref 4.2–5.9)
ECHO LV INTERNAL DIMENSION SYSTOLIC INDEX: 2.1 CM/M2
ECHO LV INTERNAL DIMENSION SYSTOLIC: 4.1 CM
ECHO LV IVSD: 0.9 CM (ref 0.6–1)
ECHO LV IVSS: 1.1 CM
ECHO LV MASS 2D: 162.1 G (ref 88–224)
ECHO LV MASS INDEX 2D: 83.1 G/M2 (ref 49–115)
ECHO LV POSTERIOR WALL DIASTOLIC: 0.8 CM (ref 0.6–1)
ECHO LV POSTERIOR WALL SYSTOLIC: 1 CM
ECHO LV RELATIVE WALL THICKNESS RATIO: 0.3
ECHO LVOT AREA: 3.1 CM2
ECHO LVOT AV VTI INDEX: 0.34
ECHO LVOT DIAM: 2 CM
ECHO LVOT MEAN GRADIENT: 2 MMHG
ECHO LVOT PEAK GRADIENT: 5 MMHG
ECHO LVOT PEAK VELOCITY: 1.1 M/S
ECHO LVOT STROKE VOLUME INDEX: 34.8 ML/M2
ECHO LVOT SV: 67.8 ML
ECHO LVOT VTI: 21.6 CM
ECHO MV "A" WAVE DURATION: 117.7 MSEC
ECHO MV A VELOCITY: 1.2 M/S
ECHO MV AREA PHT: 2.6 CM2
ECHO MV AREA VTI: 2.1 CM2
ECHO MV E DECELERATION TIME (DT): 256.4 MS
ECHO MV E VELOCITY: 0.79 M/S
ECHO MV E/A RATIO: 0.66
ECHO MV LVOT VTI INDEX: 1.52
ECHO MV MAX VELOCITY: 1.3 M/S
ECHO MV MEAN GRADIENT: 3 MMHG
ECHO MV MEAN VELOCITY: 0.8 M/S
ECHO MV PEAK GRADIENT: 7 MMHG
ECHO MV PRESSURE HALF TIME (PHT): 84.5 MS
ECHO MV VTI: 32.8 CM
ECHO PULMONARY ARTERY END DIASTOLIC PRESSURE: 7 MMHG
ECHO PV MAX VELOCITY: 1.2 M/S
ECHO PV MEAN GRADIENT: 3 MMHG
ECHO PV MEAN VELOCITY: 0.8 M/S
ECHO PV PEAK GRADIENT: 6 MMHG
ECHO PV REGURGITANT MAX VELOCITY: 1.3 M/S
ECHO PV VTI: 22.8 CM
ECHO PVEIN A DURATION: 148.8 MS
ECHO PVEIN A VELOCITY: 0.6 M/S
ECHO PVEIN PEAK D VELOCITY: 0.3 M/S
ECHO PVEIN PEAK S VELOCITY: 0.5 M/S
ECHO PVEIN S/D RATIO: 1.7
ECHO RIGHT VENTRICULAR SYSTOLIC PRESSURE (RVSP): 44 MMHG
ECHO RV INTERNAL DIMENSION: 4 CM
ECHO TV REGURGITANT MAX VELOCITY: 2.7 M/S
ECHO TV REGURGITANT PEAK GRADIENT: 29 MMHG
ERYTHROCYTE [DISTWIDTH] IN BLOOD BY AUTOMATED COUNT: 20.3 % (ref 11.5–15)
FERRITIN SERPL-MCNC: 159 NG/ML
FIO2: 40 %
GFR SERPL CREATININE-BSD FRML MDRD: 42 ML/MIN/1.73M2
GLUCOSE SERPL-MCNC: 153 MG/DL (ref 74–99)
HCO3: 21.9 MMOL/L (ref 22–26)
HCT VFR BLD AUTO: 25.1 % (ref 37–54)
HGB BLD-MCNC: 7.4 G/DL (ref 12.5–16.5)
HHB: 5.7 % (ref 0–5)
IRON SATN MFR SERPL: 12 % (ref 20–55)
IRON SERPL-MCNC: 19 UG/DL (ref 59–158)
LAB: ABNORMAL
Lab: 431
MAGNESIUM SERPL-MCNC: 2 MG/DL (ref 1.6–2.6)
MCH RBC QN AUTO: 22 PG (ref 26–35)
MCHC RBC AUTO-ENTMCNC: 29.5 G/DL (ref 32–34.5)
MCV RBC AUTO: 74.7 FL (ref 80–99.9)
METHB: 0.3 % (ref 0–1.5)
MODE: AC
O2 CONTENT: 10.7 ML/DL
O2 SATURATION: 94.2 % (ref 92–98.5)
O2HB: 92.7 % (ref 94–97)
OPERATOR ID: 2860
PATIENT TEMP: 37 C
PCO2: 37 MMHG (ref 35–45)
PEEP/CPAP: 8 CMH2O
PFO2: 1.87 MMHG/%
PH BLOOD GAS: 7.39 (ref 7.35–7.45)
PHOSPHATE SERPL-MCNC: 3.2 MG/DL (ref 2.5–4.5)
PLATELET # BLD AUTO: 261 K/UL (ref 130–450)
PMV BLD AUTO: 9.7 FL (ref 7–12)
PO2: 74.9 MMHG (ref 75–100)
POTASSIUM SERPL-SCNC: 4 MMOL/L (ref 3.5–5)
RBC # BLD AUTO: 3.36 M/UL (ref 3.8–5.8)
RI(T): 2.24
RR MECHANICAL: 20 B/MIN
SODIUM SERPL-SCNC: 141 MMOL/L (ref 132–146)
SOURCE, BLOOD GAS: ABNORMAL
THB: 8.1 G/DL (ref 11.5–16.5)
TIBC SERPL-MCNC: 159 UG/DL (ref 250–450)
TIME ANALYZED: 438
TME LAST DOSE: NORMAL H
TRANSFUSION STATUS: NORMAL
UNIT DIVISION: 0
UNIT ISSUE DATE/TIME: NORMAL
VANCOMYCIN DOSE: NORMAL MG
VANCOMYCIN SERPL-MCNC: 17.2 UG/ML (ref 5–40)
VT MECHANICAL: 450 ML
WBC OTHER # BLD: 13.2 K/UL (ref 4.5–11.5)

## 2024-04-15 PROCEDURE — 2580000003 HC RX 258

## 2024-04-15 PROCEDURE — 99291 CRITICAL CARE FIRST HOUR: CPT | Performed by: INTERNAL MEDICINE

## 2024-04-15 PROCEDURE — 94003 VENT MGMT INPAT SUBQ DAY: CPT

## 2024-04-15 PROCEDURE — 6360000002 HC RX W HCPCS: Performed by: INTERNAL MEDICINE

## 2024-04-15 PROCEDURE — A4216 STERILE WATER/SALINE, 10 ML: HCPCS

## 2024-04-15 PROCEDURE — 93306 TTE W/DOPPLER COMPLETE: CPT

## 2024-04-15 PROCEDURE — 83735 ASSAY OF MAGNESIUM: CPT

## 2024-04-15 PROCEDURE — 2000000000 HC ICU R&B

## 2024-04-15 PROCEDURE — 84100 ASSAY OF PHOSPHORUS: CPT

## 2024-04-15 PROCEDURE — 6370000000 HC RX 637 (ALT 250 FOR IP): Performed by: INTERNAL MEDICINE

## 2024-04-15 PROCEDURE — 80202 ASSAY OF VANCOMYCIN: CPT

## 2024-04-15 PROCEDURE — 94640 AIRWAY INHALATION TREATMENT: CPT

## 2024-04-15 PROCEDURE — 83540 ASSAY OF IRON: CPT

## 2024-04-15 PROCEDURE — 6360000002 HC RX W HCPCS

## 2024-04-15 PROCEDURE — 2580000003 HC RX 258: Performed by: INTERNAL MEDICINE

## 2024-04-15 PROCEDURE — C9113 INJ PANTOPRAZOLE SODIUM, VIA: HCPCS

## 2024-04-15 PROCEDURE — 2580000003 HC RX 258: Performed by: STUDENT IN AN ORGANIZED HEALTH CARE EDUCATION/TRAINING PROGRAM

## 2024-04-15 PROCEDURE — 6370000000 HC RX 637 (ALT 250 FOR IP)

## 2024-04-15 PROCEDURE — 93306 TTE W/DOPPLER COMPLETE: CPT | Performed by: INTERNAL MEDICINE

## 2024-04-15 PROCEDURE — 82805 BLOOD GASES W/O2 SATURATION: CPT

## 2024-04-15 PROCEDURE — 2500000003 HC RX 250 WO HCPCS: Performed by: INTERNAL MEDICINE

## 2024-04-15 PROCEDURE — 80048 BASIC METABOLIC PNL TOTAL CA: CPT

## 2024-04-15 PROCEDURE — 85027 COMPLETE CBC AUTOMATED: CPT

## 2024-04-15 PROCEDURE — 82728 ASSAY OF FERRITIN: CPT

## 2024-04-15 PROCEDURE — 83550 IRON BINDING TEST: CPT

## 2024-04-15 RX ORDER — QUETIAPINE FUMARATE 25 MG/1
50 TABLET, FILM COATED ORAL 2 TIMES DAILY
Status: DISCONTINUED | OUTPATIENT
Start: 2024-04-15 | End: 2024-04-18

## 2024-04-15 RX ORDER — ARFORMOTEROL TARTRATE 15 UG/2ML
15 SOLUTION RESPIRATORY (INHALATION)
Status: DISCONTINUED | OUTPATIENT
Start: 2024-04-15 | End: 2024-04-20

## 2024-04-15 RX ADMIN — IPRATROPIUM BROMIDE 0.5 MG: 0.5 SOLUTION RESPIRATORY (INHALATION) at 20:20

## 2024-04-15 RX ADMIN — PIPERACILLIN AND TAZOBACTAM 4500 MG: 4; .5 INJECTION, POWDER, FOR SOLUTION INTRAVENOUS at 07:51

## 2024-04-15 RX ADMIN — PIPERACILLIN AND TAZOBACTAM 4500 MG: 4; .5 INJECTION, POWDER, FOR SOLUTION INTRAVENOUS at 16:49

## 2024-04-15 RX ADMIN — Medication 200 MCG/HR: at 07:35

## 2024-04-15 RX ADMIN — PIPERACILLIN AND TAZOBACTAM 4500 MG: 4; .5 INJECTION, POWDER, FOR SOLUTION INTRAVENOUS at 00:17

## 2024-04-15 RX ADMIN — BUDESONIDE 250 MCG: 0.25 INHALANT RESPIRATORY (INHALATION) at 08:44

## 2024-04-15 RX ADMIN — IPRATROPIUM BROMIDE 0.5 MG: 0.5 SOLUTION RESPIRATORY (INHALATION) at 16:08

## 2024-04-15 RX ADMIN — KETAMINE HYDROCHLORIDE 0.2 MG/KG/HR: 100 INJECTION INTRAMUSCULAR; INTRAVENOUS at 00:53

## 2024-04-15 RX ADMIN — DEXTROSE MONOHYDRATE: 50 INJECTION, SOLUTION INTRAVENOUS at 04:46

## 2024-04-15 RX ADMIN — CHLORHEXIDINE GLUCONATE 15 ML: 1.2 RINSE ORAL at 09:06

## 2024-04-15 RX ADMIN — BUDESONIDE 250 MCG: 0.25 INHALANT RESPIRATORY (INHALATION) at 20:20

## 2024-04-15 RX ADMIN — SODIUM CHLORIDE, PRESERVATIVE FREE 40 MG: 5 INJECTION INTRAVENOUS at 09:10

## 2024-04-15 RX ADMIN — ANTI-FUNGAL POWDER MICONAZOLE NITRATE TALC FREE: 1.42 POWDER TOPICAL at 09:09

## 2024-04-15 RX ADMIN — DEXAMETHASONE SODIUM PHOSPHATE 6 MG: 10 INJECTION, SOLUTION INTRAMUSCULAR; INTRAVENOUS at 09:08

## 2024-04-15 RX ADMIN — GUAIFENESIN 400 MG: 400 TABLET ORAL at 13:35

## 2024-04-15 RX ADMIN — ARFORMOTEROL TARTRATE 15 MCG: 15 SOLUTION RESPIRATORY (INHALATION) at 20:20

## 2024-04-15 RX ADMIN — Medication 200 MCG/HR: at 01:46

## 2024-04-15 RX ADMIN — IPRATROPIUM BROMIDE 0.5 MG: 0.5 SOLUTION RESPIRATORY (INHALATION) at 12:03

## 2024-04-15 RX ADMIN — ANTI-FUNGAL POWDER MICONAZOLE NITRATE TALC FREE: 1.42 POWDER TOPICAL at 20:47

## 2024-04-15 RX ADMIN — ARFORMOTEROL TARTRATE 15 MCG: 15 SOLUTION RESPIRATORY (INHALATION) at 08:53

## 2024-04-15 RX ADMIN — CHLORHEXIDINE GLUCONATE 15 ML: 1.2 RINSE ORAL at 20:47

## 2024-04-15 RX ADMIN — IPRATROPIUM BROMIDE 0.5 MG: 0.5 SOLUTION RESPIRATORY (INHALATION) at 08:44

## 2024-04-15 RX ADMIN — QUETIAPINE FUMARATE 50 MG: 25 TABLET ORAL at 20:47

## 2024-04-15 RX ADMIN — ATORVASTATIN CALCIUM 40 MG: 40 TABLET, FILM COATED ORAL at 09:06

## 2024-04-15 RX ADMIN — SODIUM CHLORIDE, PRESERVATIVE FREE 40 MG: 5 INJECTION INTRAVENOUS at 20:47

## 2024-04-15 RX ADMIN — GUAIFENESIN 400 MG: 400 TABLET ORAL at 09:09

## 2024-04-15 RX ADMIN — Medication 200 MCG/HR: at 12:55

## 2024-04-15 RX ADMIN — Medication 200 MCG/HR: at 18:39

## 2024-04-15 RX ADMIN — QUETIAPINE FUMARATE 50 MG: 25 TABLET ORAL at 13:35

## 2024-04-15 RX ADMIN — GUAIFENESIN 400 MG: 400 TABLET ORAL at 20:48

## 2024-04-15 ASSESSMENT — PULMONARY FUNCTION TESTS
PIF_VALUE: 13
PIF_VALUE: 32
PIF_VALUE: 28
PIF_VALUE: 28
PIF_VALUE: 13
PIF_VALUE: 14
PIF_VALUE: 33
PIF_VALUE: 35
PIF_VALUE: 50
PIF_VALUE: 37
PIF_VALUE: 29
PIF_VALUE: 33
PIF_VALUE: 41
PIF_VALUE: 40
PIF_VALUE: 33
PIF_VALUE: 11
PIF_VALUE: 33
PIF_VALUE: 33
PIF_VALUE: 30
PIF_VALUE: 34
PIF_VALUE: 36
PIF_VALUE: 38
PIF_VALUE: 31
PIF_VALUE: 32
PIF_VALUE: 29
PIF_VALUE: 14
PIF_VALUE: 15
PIF_VALUE: 31
PIF_VALUE: 38
PIF_VALUE: 36
PIF_VALUE: 28
PIF_VALUE: 14
PIF_VALUE: 31

## 2024-04-15 ASSESSMENT — PAIN SCALES - GENERAL: PAINLEVEL_OUTOF10: 0

## 2024-04-15 NOTE — PROGRESS NOTES
Nephrology Progress Note  [facility]      CC:       HPI:   The patient is an 83 year old male with a past medical history that includes COPD, duodenal ulcer, CAD with cardiac stenting, HTN, HLD, valvular heart disease, DVT, arthritis. The patient presented to the ED on 4/11 with c/o shortness of breath with cough and fever that started a few days ago. The patient's VS in the ED were /67  Pulse 98  Temp 98.6 °F (37 °C) (Axillary)  Resp 25  SpO2 97% . His initial labs in the ED were significant for K+ 6.2, CO@ 23, BUN 42, Cr 1.9, GFR 35, mg 1.7, PO4 7.7, BNP 5441, troponin 105, WBC 26, hgb 8.2. The patient's CXR noted right airspace density likely pneumonia. The patient received treatment for hyperkalemia and received ceftriaxone and doxycycline. The patient was found unresponsive upon arrival to the sixth floor from the ED and an RRT was called and the patient was found to have hypercapneic ABG results and was placed on AVAPS. Upon assessment today the patient is easily aroused and attempts to answer questions but is disoriented and difficult to understand. Remains on NIV.     Interval History:    4/14: Seen and examined.  Patient was transferred to the medical intensive care unit last evening and was intubated due to persistent hypercarbia.  Making acceptable urine output.  Sedated.  Discussed with wife and nurse at bedside.  No further events this morning.    4/15/24: pt seen and examined in icu, chart reviewed. Intubated, family at bedside      PMH:          Past Medical History:   Diagnosis Date    Arthritis     CAD (coronary artery disease)     COPD (chronic obstructive pulmonary disease) (HCC)     Duodenal ulcer 4/12/2018    Hyperlipidemia     Hypertension     Valvular heart disease        Patient Active Problem List   Diagnosis    HCAP (healthcare-associated pneumonia)    Melena    Rectal bleeding    Acute blood loss anemia    Duodenal ulcer    Aortic valve disease    Chronic obstructive lung  disease (HCC)    Benign essential hypertension    Asymptomatic coronary heart disease    Pure hypercholesterolemia    Acute respiratory failure (HCC)    Acute hypoxic respiratory failure (HCC)    Acute anemia    Generalized edema    SOB (shortness of breath)    Other and unspecified hyperlipidemia    Chronic ischemic heart disease    Pneumonia due to infectious organism    Acute on chronic respiratory failure (HCC)    Palliative care encounter    Moderate protein-calorie malnutrition (HCC)    Hypoxia       Meds:     arformoterol tartrate  15 mcg Nebulization BID RT    pantoprazole (PROTONIX) 40 mg in sodium chloride (PF) 0.9 % 10 mL injection  40 mg IntraVENous Q12H    piperacillin-tazobactam  4,500 mg IntraVENous Q8H    chlorhexidine  15 mL Mouth/Throat BID    [Held by provider] sodium zirconium cyclosilicate  10 g Oral Daily    dexAMETHasone  6 mg IntraVENous Daily    vancomycin (VANCOCIN) intermittent dosing (placeholder)   Other RX Placeholder    miconazole   Topical BID    [Held by provider] amLODIPine  5 mg Oral Daily    [Held by provider] aspirin  81 mg Oral Daily    guaiFENesin  400 mg Oral TID    isosorbide mononitrate  60 mg Oral Daily    [Held by provider] lisinopril  10 mg Oral Daily    [Held by provider] metoprolol tartrate  50 mg Oral BID    atorvastatin  40 mg Oral Daily    [Held by provider] enoxaparin  40 mg SubCUTAneous Daily    budesonide  0.25 mg Nebulization BID RT    And    ipratropium  0.5 mg Nebulization 4x Daily RT        sodium chloride      fentaNYL 200 mcg/hr (04/15/24 0735)    ketamine (KETALAR) 500 mg in sodium chloride 0.9 % 100 mL infusion Stopped (04/15/24 0917)    dextrose         Meds prn:     sodium chloride, midazolam, glucose, dextrose bolus **OR** dextrose bolus, glucagon (rDNA), dextrose, albuterol, acetaminophen    Meds prior to admission:     No current facility-administered medications on file prior to encounter.     Current Outpatient Medications on File Prior to Encounter

## 2024-04-15 NOTE — PROGRESS NOTES
Chris Brown M.D.,[facility]  Ish Lagunas D.O., F.MARY KATE.ALAN.OJanetI., [facility]  Ansley Everett M.D.  Mary Alice Chavez M.D.   EZEQUIEL CansecoO.        Daily Pulmonary Progress Note    Patient:  Virgilio Sanchez 83 y.o. male MRN: [id number]            Synopsis     We are following patient for respiratory failure, CAP    \"CC\" SOB    Code status: DNR CCA      Subjective      Patient was seen and examined intubated in the ICU.  He is currently on a fentanyl infusion.  He is alert and nodding appropriately.  Wife is present at the bedside.  He is currently on PSV 8, 40%, +5, SpO2 97%.    Review of Systems:  Unable to obtain-intubated    24-hour events:  No new events    Objective   OBJECTIVE:   BP (!) 113/100   Pulse 67   Temp 98.2 °F (36.8 °C) (Temporal)   Resp 20   Ht 1.829 m (6')   Wt 74 kg (163 lb 2.3 oz)   SpO2 94%   BMI 22.13 kg/m²   SpO2 Readings from Last 1 Encounters:   04/15/24 94%        I/O:    Intake/Output Summary (Last 24 hours) at 4/15/2024 1339  Last data filed at 4/15/2024 1331  Gross per 24 hour   Intake 4803.36 ml   Output 890 ml   Net 3913.36 ml     Vent Information  Ventilator ID: [id number]  Ventilator Initiate: Yes  Vent Mode: (S) AC/VC (Patient back on AC/VC for increased RR and decreased Vt.)       IPAP: 15 cmH20  CPAP/EPAP: 6 cmH2O     CURRENT MEDS :  Scheduled Meds:   arformoterol tartrate  15 mcg Nebulization BID RT    QUEtiapine  50 mg Oral BID    pantoprazole (PROTONIX) 40 mg in sodium chloride (PF) 0.9 % 10 mL injection  40 mg IntraVENous Q12H    piperacillin-tazobactam  4,500 mg IntraVENous Q8H    chlorhexidine  15 mL Mouth/Throat BID    [Held by provider] sodium zirconium cyclosilicate  10 g Oral Daily    dexAMETHasone  6 mg IntraVENous Daily    miconazole   Topical BID    [Held by provider] amLODIPine  5 mg Oral Daily    [Held by provider] aspirin  81 mg Oral Daily    guaiFENesin  400 mg Oral TID    isosorbide mononitrate  60 mg Oral Daily    [Held by provider] lisinopril  10 mg Oral  7.350 - 7.450  7.390   PCO2 35.0 - 45.0 mmHg 37.0   pO2 75.0 - 100.0 mmHg 74.9 (L)   HCO3 22.0 - 26.0 mmol/L 21.9 (L)   Base Excess -3.0 - 3.0 mmol/L -2.7   O2 Sat 92.0 - 98.5 % 94.2   THB,THB 11.5 - 16.5 g/dL 8.1 (L)   O2Hb 94.0 - 97.0 % 92.7 (L)   Carboxy-Hgb 0.0 - 1.5 % 1.3   METHB,METHB 0.0 - 1.5 % 0.3   HHb 0.0 - 5.0 % 5.7 (H)   O2 Content mL/dL 10.7   AaDO2 mmHg 167.8   RI(T)  2.24   FIO2 % 40.0   PO2/FIO2 mmHg/% 1.87   Pt Temp C 37.0   Critical(s) Notified  . 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  Albuterol as needed  Follow chest imaging and ABGs  Antibiotics per infectious disease-Zosyn steroids per ICU team-Decadron  Follow echocardiogram  Nutritional support via NG tube  VAP prophylaxis-Peridex    This plan of care was reviewed in collaboration with Dr. Peters    Electronically signed by ANGELIQUE Jerry - CNP on 4/15/2024 at 1:39 PM    I personally saw, examined, and cared for the patient. I performed the substantive portion of the visit. Labs, medications, radiographs reviewed. I agree with history exam and plans detailed in NP note.        Noel Peters DO

## 2024-04-15 NOTE — PROGRESS NOTES
Physical Therapy    PT consult to evaluate/treat received and appreciated.  Pt chart reviewed and evaluation attempted.  Pt is currently on hold d/t respiratory status.  Will check back as able.  Thank you.        August Logan, PT, DPT   [id number]

## 2024-04-15 NOTE — PROGRESS NOTES
Speech Language Pathology  NAME:  Virgilio Sanchez  :  1940  DATE: 4/15/2024  ROOM:  [facility]-A    Pt unavailable for dysphagia intervention due to:    Patient intubated SLP will signoff at this time please reorder evaluation when medically appropriate     Will re-attempt as appropriate. Thank you.

## 2024-04-15 NOTE — PLAN OF CARE
Problem: Safety - Adult  Goal: Free from fall injury  Outcome: Progressing     Problem: Skin/Tissue Integrity  Goal: Absence of new skin breakdown  Description: 1.  Monitor for areas of redness and/or skin breakdown  2.  Assess vascular access sites hourly  3.  Every 4-6 hours minimum:  Change oxygen saturation probe site  4.  Every 4-6 hours:  If on nasal continuous positive airway pressure, respiratory therapy assess nares and determine need for appliance change or resting period.  Outcome: Progressing     Problem: Safety - Medical Restraint  Goal: Remains free of injury from restraints (Restraint for Interference with Medical Device)  Description: INTERVENTIONS:  1. Determine that other, less restrictive measures have been tried or would not be effective before applying the restraint  2. Evaluate the patient's condition at the time of restraint application  3. Inform patient/family regarding the reason for restraint  4. Q2H: Monitor safety, psychosocial status, comfort, nutrition and hydration  Outcome: Progressing  Flowsheets  Taken 4/15/2024 0800 by David Holloway RN  Remains free of injury from restraints (restraint for interference with medical device): Determine that other, less restrictive measures have been tried or would not be effective before applying the restraint  Taken 4/14/2024 2000 by Karen Gibbs  Remains free of injury from restraints (restraint for interference with medical device):   Determine that other, less restrictive measures have been tried or would not be effective before applying the restraint   Evaluate the patient's condition at the time of restraint application   Inform patient/family regarding the reason for restraint   Every 2 hours: Monitor safety, psychosocial status, comfort, nutrition and hydration     Problem: Pain  Goal: Verbalizes/displays adequate comfort level or baseline comfort level  Outcome: Progressing     Problem: Nutrition Deficit:  Goal: Optimize

## 2024-04-15 NOTE — FLOWSHEET NOTE
Patient very agitated. Patient trying to throw hands and grab at IV tubing and ETT. Patient is unable to be redirected at this time. Restraints continued for patient safety.

## 2024-04-15 NOTE — PROGRESS NOTES
Spontaneous Parameters performed on PS 5 40 +5    Patient has a cuff leak.    VT = 346 ml  f = 28  B/M  Ve = 8.29 L/M  NIF = -21  cmH2O  VC = 344 mL  RSBI = 80      Performed by Ifeoma Mcnulty RCP

## 2024-04-15 NOTE — PROGRESS NOTES
Pharmacy Consultation Note  (Antibiotic Dosing and Monitoring)    Initial consult date: 4/13/24  Consulting physician/provider: Dr. Espana  Drug: Vancomycin  Indication: Bacteremia    Vancomycin has been discontinued   Clinical Pharmacy to sign-off  Physician to re-consult pharmacy if future dosing is needed    Thank you for the consult,    Leroy Peres, PharmD, BCPS, BCCCP 4/15/2024 11:22 AM

## 2024-04-15 NOTE — PROGRESS NOTES
04/15/24 1259   Patient Observation   Pulse (!) 112   Respirations 25   SpO2 94 %   Vent Information   Vent Mode (S)  AC/VC  (Patient back on AC/VC for increased RR and decreased Vt.)   Ventilator Settings   Vt (Set, mL) 450 mL   FiO2  40 %   Resp Rate (Set) 20 bpm   PEEP/CPAP (cmH2O) 8   Peak Inspiratory Flow (Set) 65 L/sec   Vent Patient Data (Readings)   Vt (Measured) 0 mL   Peak Inspiratory Pressure (cmH2O) 50 cmH2O   Rate Measured 55 br/min   Minute Volume (L/min) 10.3 Liters   Peak Inspiratory Flow (lpm) 65 L/sec   Mean Airway Pressure (cmH2O) 20 cmH20   Plateau Pressure (cm H2O) 16 cm H2O   Driving Pressure 8   I:E Ratio 1.10:1   Flow Sensitivity 3 L/min   Backup Apnea On   Backup Rate 20 Breaths Per Minute   Backup Vt 450   Vent Alarm Settings   High Pressure (cmH2O) 50 cmH2O   Low Minute Volume (lpm) 7 L/min   High Minute Volume (lpm) 16 L/min   Low Exhaled Vt (ml) 350 mL   High Exhaled Vt (ml) 900 mL   RR High (bpm) 35 br/min   Apnea (secs) 20 secs   Additional Respiratoray Assessments   Humidification Source Heated wire   Humidification Temp 37   Ambu Bag With Mask At Bedside Yes   Airway Clearance   Suction ET Tube   Suction Device Inline suction catheter   Sputum Amount Small   Sputum Color/Odor Tan   Sputum Consistency Thick   ETT    Placement Date/Time: 04/13/24 1356   Placed By: Licensed provider  Placement Verified By: Auscultation;Capnometry  Preoxygenation: Yes  Mask Ventilation: Ventilated by mask (1)  Airway Type: Cuffed  Airway Tube Size: 8 mm  Location: Oral  Secured At: ...   Secured At 26 cm   Measured From Lips   Secured By Commercial tube sanz   Site Assessment Dry

## 2024-04-15 NOTE — PROGRESS NOTES
represent pneumonia although pulmonary infarct or pleural based mass cannot be entirely excluded.  This finding could be further evaluated with contrast-enhanced CT as clinically indicated.       All Others since admission  XR CHEST PORTABLE    Result Date: 4/14/2024  Stable increased markings seen diffusely throughout the left lung with small left pleural effusion.  Lines and tubes are stable.     US RETROPERITONEAL COMPLETE    Result Date: 4/13/2024  Unremarkable ultrasound of the kidneys and urinary bladder.     Vascular duplex lower extremity venous bilateral    Result Date: 4/13/2024  No evidence of DVT in either lower extremity.     XR ABDOMEN FOR NG/OG/NE TUBE PLACEMENT    Result Date: 4/13/2024  Enteric tube tip in the proximal gastric body.     XR CHEST PORTABLE    Result Date: 4/13/2024  No acute process. IMPRESSION: 1. ET tube tip 1.0 cm from the aleksandr. 2. Stable left upper lobe airspace opacity.     XR CHEST PORTABLE    Result Date: 4/11/2024  New airspace density left upper lobe peripherally, likely pneumonia. Follow-up to resolution recommended.  Infiltrate new when compared to the previous study.  This may represent pneumonia although pulmonary infarct or pleural based mass cannot be entirely excluded.  This finding could be further evaluated with contrast-enhanced CT as clinically indicated.       EKG  :     Rhythm Strip :Normal Sinus Rhythm    ECHO  :                        Assessment and Plan of care     Diagnosis:  Acute metabolic encephalopathy 2/2 in the setting of sedation for mechanical ventilation  Acute hypoxic hypercapnic respiratory failure requiring mechanical ventilation  Left upper lobe pneumonia-COPD  Hemolytic streptococci bacteremia  Sepsis  Ischemic cardiomyopathy-ejection fraction 45%  DEIDRE 2/2 in the setting of decreased intake versus decreased effective blood volume  Microcytic hypochromic anemia 2/2 most likely iron deficiency    Plan:    Neuro: Patient is intubated but  errors may be present. Please excuse any transcriptional grammatical or spelling errors that may have escaped my editorial review.    Total critical care time caring for this patient with life threatening, unstable organ failure, including direct patient contact, management of life support systems, review of data including imaging and labs, discussions with other team members and physicians is at least 35 Min so far today, excluding procedures.      Electronically signed by Reed Hernandez MD on 4/15/2024 at 3:30 PM  Critical Care Medicine    I agree with the above plan.During multidisciplinary team rounds Virgilio Sanchez 83 y.o. male, was seen, examined and discussed. This is confirmation that I have personally seen and examined the patient and that the key elements of the encounter were performed by me (> 85 % time).  The medications & laboratory data was discussed and adjusted where necessary. The radiographic images were reviewed or with radiologist or consultant if felt dis-concordant with the exam or history. The above findings were corroborated, plans confirmed and changes made if needed.   Family is updated at the bedside as available. Key issues of the case were discussed among consultants.  Critical Care time spent 35 mins.    Electronically signed by Raul Prakash MD on 4/15/2024 at 3:45 PM  Critical Care Medicine

## 2024-04-15 NOTE — CONSULTS
Department of Internal Medicine  Infectious Diseases   Consult Note      Reason for Consult: Strep  Bacteremia , Pneumonia       Requesting Physician:  Dr Altamirano         HISTORY OF PRESENT ILLNESS:      Pt is intubated at present .    This is an 83 yrs old male with hx of COPD, CAD  HTN, presented to the ER on 4/11 with shortness of breath, change in mental status - he denied fever or chills   WBC was 26 K , chest  ray FLORENTINO consolidation   Pt was placed on AVAPS - resp status worsened , pt was transferred to MICU - intubated , vancomycin and zosyn started   Resp panel negative   Nasal MRSA negative     Past Medical History:      Past Medical History:   Diagnosis Date    Arthritis     CAD (coronary artery disease)     COPD (chronic obstructive pulmonary disease) (HCC)     Duodenal ulcer 4/12/2018    Hyperlipidemia     Hypertension     Valvular heart disease          Past Surgical History:      Past Surgical History:   Procedure Laterality Date    ENDOSCOPIC ULTRASOUND (LOWER)  03/01/2018    EYE SURGERY      cataracts    JOINT REPLACEMENT         Current Medications:      Current Facility-Administered Medications   Medication Dose Route Frequency Provider Last Rate Last Admin    arformoterol tartrate (BROVANA) nebulizer solution 15 mcg  15 mcg Nebulization BID RT Reed Hernandez MD   15 mcg at 04/15/24 0853    pantoprazole (PROTONIX) 40 mg in sodium chloride (PF) 0.9 % 10 mL injection  40 mg IntraVENous Q12H Lelsy Tse APRN - CNP   40 mg at 04/15/24 0910    0.9 % sodium chloride infusion   IntraVENous PRN Lesly Tse APRN - CNP        midazolam PF (VERSED) injection 2 mg  2 mg IntraVENous Q4H PRN Lesly Tse APRN - CNP        fentaNYL (SUBLIMAZE) 1,000 mcg in sodium chloride 0.9% 100 mL infusion   mcg/hr IntraVENous Continuous Akshat Espana MD 20 mL/hr at 04/15/24 0735 200 mcg/hr at 04/15/24 0735    piperacillin-tazobactam (ZOSYN) 4,500 mg in sodium chloride 0.9 % 100 mL IVPB  CLARITYU Clear 03/01/2018 09:35 PM    SPECGRAV 1.020 04/12/2024 09:00 PM    LEUKOCYTESUR TRACE 04/12/2024 09:00 PM    UROBILINOGEN 0.2 04/12/2024 09:00 PM    BILIRUBINUR NEGATIVE 04/12/2024 09:00 PM    BLOODU LARGE 03/01/2018 09:35 PM    GLUCOSEU NEGATIVE 04/12/2024 09:00 PM       ABG:  No results found for: \"FDA6CAS\", \"BEART\", \"U6WQITXF\", \"PHART\", \"THGBART\", \"GGB6RNZ\", \"PO2ART\", \"HIY3AHU\"    MICROBIOLOGY:    Blood culture -    DIRECT GRAM STAIN FROM BOTTLE: GRAM POSITIVE COCCI IN CLUSTERS       STREPTOCOCCI, ALPHA HEMOLYTIC A         Radiology :    Chest x ray - Stable increased markings seen diffusely throughout the left lung         IMPRESSION:     Respiratory failure   CAP   Leukocytosis   Strep bacteremia ( turned +ve after 3 days )       RECOMMENDATIONS:      Stop vancomycin   Await final blood cx   Continue IV zosyn 4.5 grams q 8 hrs ( pending sputum cx )     Thank you Dr Altamirano for the consult

## 2024-04-15 NOTE — PROGRESS NOTES
04/15/24 0848   Patient Observation   Pulse 77   Respirations 20   SpO2 98 %   Vent Information   Vent Mode AC/VC   Ventilator Settings   Vt (Set, mL) 450 mL   FiO2  40 %   Resp Rate (Set) 20 bpm   PEEP/CPAP (cmH2O) 8   Peak Inspiratory Flow (Set) 65 L/sec   Vent Patient Data (Readings)   Vt (Measured) 19 mL   Peak Inspiratory Pressure (cmH2O) 31 cmH2O   Rate Measured 31 br/min   Minute Volume (L/min) 4.94 Liters   Peak Inspiratory Flow (lpm) 65 L/sec   Mean Airway Pressure (cmH2O) 17 cmH20   Plateau Pressure (cm H2O) 16 cm H2O   Driving Pressure 8   I:E Ratio 1:2.9   Flow Sensitivity 3 L/min   Static Compliance (L/cm H2O) 53   Backup Apnea On   Backup Rate 20 Breaths Per Minute   Backup Vt 450   Vent Alarm Settings   High Pressure (cmH2O) 50 cmH2O   Low Minute Volume (lpm) 7 L/min   High Minute Volume (lpm) 16 L/min   Low Exhaled Vt (ml) 350 mL   High Exhaled Vt (ml) 900 mL   RR High (bpm) 35 br/min   Apnea (secs) 20 secs   Additional Respiratoray Assessments   Humidification Source Heated wire   Humidification Temp 37   Circuit Condensation Drained   Ambu Bag With Mask At Bedside Yes   Airway Clearance   Suction ET Tube;Oral   Suction Device Inline suction catheter;Anetteuer   Sputum Method Obtained Endotracheal   Sputum Amount Moderate   Sputum Color/Odor Tan   Sputum Consistency Thick   ETT    Placement Date/Time: 04/13/24 1356   Placed By: Licensed provider  Placement Verified By: Auscultation;Capnometry  Preoxygenation: Yes  Mask Ventilation: Ventilated by mask (1)  Airway Type: Cuffed  Airway Tube Size: 8 mm  Location: Oral  Secured At: ...   Secured At 26 cm   Measured From Lips   Secured By Commercial tube sanz   Site Assessment Dry

## 2024-04-15 NOTE — FLOWSHEET NOTE
Patient is intermittently agitated, pulls at lines and tubes while unrestrained. Restraints continued for patient safety.

## 2024-04-15 NOTE — PROGRESS NOTES
04/15/24 1205   Patient Observation   Pulse 73   Respirations 18   SpO2 96 %   Vent Information   Vent Mode CPAP/PS   Ventilator Settings   FiO2  40 %   PEEP/CPAP (cmH2O) 5   Pressure Support (cm H2O) 8 cm H2O   Vent Patient Data (Readings)   Vt (Measured) 342 mL   Peak Inspiratory Pressure (cmH2O) 13 cmH2O   Rate Measured 25 br/min   Minute Volume (L/min) 8.93 Liters   Mean Airway Pressure (cmH2O) 8 cmH20   Plateau Pressure (cm H2O) 16 cm H2O   Driving Pressure 11   I:E Ratio 1:3.20   Flow Sensitivity 3 L/min   Backup Apnea On   Backup Rate 20 Breaths Per Minute   Backup Vt 450   Vent Alarm Settings   High Pressure (cmH2O) 50 cmH2O   Low Minute Volume (lpm) 5 L/min   High Minute Volume (lpm) 16 L/min   Low Exhaled Vt (ml) 300 mL   High Exhaled Vt (ml) 900 mL   RR High (bpm) 35 br/min   Apnea (secs) 20 secs   Additional Respiratoray Assessments   Humidification Source Heated wire   Humidification Temp 37   Ambu Bag With Mask At Bedside Yes   Airway Clearance   Suction ET Tube;Oral   Suction Device Inline suction catheter   Sputum Method Obtained Endotracheal   Sputum Amount Small   Sputum Color/Odor Tan   Sputum Consistency Thick   ETT    Placement Date/Time: 04/13/24 1356   Placed By: Licensed provider  Placement Verified By: Auscultation;Capnometry  Preoxygenation: Yes  Mask Ventilation: Ventilated by mask (1)  Airway Type: Cuffed  Airway Tube Size: 8 mm  Location: Oral  Secured At: ...   Secured At 26 cm   Measured From Lips   Secured By Commercial tube sanz   Site Assessment Dry

## 2024-04-15 NOTE — PROGRESS NOTES
04/15/24 0929   Patient Observation   Pulse 71   Respirations 20   SpO2 96 %   Vent Information   Vent Mode (S)  CPAP/PS   Ventilator Settings   FiO2  40 %   PEEP/CPAP (cmH2O) (S)  5   Pressure Support (cm H2O) (S)  8 cm H2O   Vent Patient Data (Readings)   Vt (Measured) 396 mL   Peak Inspiratory Pressure (cmH2O) 14 cmH2O   Rate Measured 16 br/min   Minute Volume (L/min) 5.84 Liters   Mean Airway Pressure (cmH2O) 7 cmH20   Plateau Pressure (cm H2O) 16 cm H2O   Driving Pressure 11   I:E Ratio 1:3.80   Flow Sensitivity 3 L/min   Backup Apnea On   Backup Rate 20 Breaths Per Minute   Backup Vt 450   Vent Alarm Settings   High Pressure (cmH2O) 50 cmH2O   Low Minute Volume (lpm) 5 L/min   High Minute Volume (lpm) 16 L/min   Low Exhaled Vt (ml) 300 mL   High Exhaled Vt (ml) 900 mL   RR High (bpm) 35 br/min   Apnea (secs) 20 secs   Additional Respiratoray Assessments   Humidification Source Heated wire   Humidification Temp 37   Ambu Bag With Mask At Bedside Yes   ETT    Placement Date/Time: 04/13/24 1356   Placed By: Licensed provider  Placement Verified By: Auscultation;Capnometry  Preoxygenation: Yes  Mask Ventilation: Ventilated by mask (1)  Airway Type: Cuffed  Airway Tube Size: 8 mm  Location: Oral  Secured At: ...   Secured At 26 cm   Measured From Lips   Secured By Commercial tube sanz   Site Assessment Dry

## 2024-04-15 NOTE — PROGRESS NOTES
Less sedated this AM  Responds well to questions  RRR  Diminished breath sounds   Minimal peripheral edema  Neuro intact   Discussed with his wife again today   Continue all measures

## 2024-04-15 NOTE — PROGRESS NOTES
I stopped by to visit the patient. He was alert and responsive, even though the medical attachments kept us from talking. I assured him of our prayers and support. He seemed appreciative.    I reached out to his wife by phone. She is handling the hospitalization as best as she can. She is practicing good self-care, eating, liquids, sleep - which is encouraging. I assured her of our prayers. She was grateful.     If you need additional support, you may reach out to us at [facility], x [phone].     En Mar; ALTHEA Malhotra

## 2024-04-15 NOTE — PROGRESS NOTES
Chart reviewed.  Patient continue on vent support, on fentanyl drip, awake, follows commands, there were no family member present at the bedside.  CODE STATUS has been established DNR CCA.  Per wife wishes, plan is to monitor patient's clinical progression.  no immediate PM needs were identified.  We will continue to follow peripherally.

## 2024-04-15 NOTE — PROGRESS NOTES
OT consult received and appreciated. Chart reviewed and discussed case with RN. Will hold OT session this date d/t respiratory status. Will evaluate at a later time. Thank you. Tamia Parmar, OTR/L # [id number]

## 2024-04-16 ENCOUNTER — APPOINTMENT (OUTPATIENT)
Dept: GENERAL RADIOLOGY | Age: 84
End: 2024-04-16
Payer: MEDICARE

## 2024-04-16 LAB
AADO2: 157.6 MMHG
ANION GAP SERPL CALCULATED.3IONS-SCNC: 13 MMOL/L (ref 7–16)
B.E.: -0.8 MMOL/L (ref -3–3)
BUN SERPL-MCNC: 39 MG/DL (ref 6–23)
CALCIUM SERPL-MCNC: 7.9 MG/DL (ref 8.6–10.2)
CHLORIDE SERPL-SCNC: 109 MMOL/L (ref 98–107)
CO2 SERPL-SCNC: 21 MMOL/L (ref 22–29)
COHB: 1.1 % (ref 0–1.5)
CREAT SERPL-MCNC: 1.5 MG/DL (ref 0.7–1.2)
CRITICAL: ABNORMAL
DATE ANALYZED: ABNORMAL
DATE OF COLLECTION: ABNORMAL
ERYTHROCYTE [DISTWIDTH] IN BLOOD BY AUTOMATED COUNT: 20.9 % (ref 11.5–15)
FIO2: 40 %
GFR SERPL CREATININE-BSD FRML MDRD: 48 ML/MIN/1.73M2
GLUCOSE SERPL-MCNC: 103 MG/DL (ref 74–99)
HCO3: 23.1 MMOL/L (ref 22–26)
HCT VFR BLD AUTO: 26.5 % (ref 37–54)
HGB BLD-MCNC: 7.7 G/DL (ref 12.5–16.5)
HHB: 3.5 % (ref 0–5)
LAB: ABNORMAL
Lab: 455
MAGNESIUM SERPL-MCNC: 2.1 MG/DL (ref 1.6–2.6)
MCH RBC QN AUTO: 21.9 PG (ref 26–35)
MCHC RBC AUTO-ENTMCNC: 29.1 G/DL (ref 32–34.5)
MCV RBC AUTO: 75.3 FL (ref 80–99.9)
METHB: 0.2 % (ref 0–1.5)
MICROORGANISM SPEC CULT: NORMAL
MODE: AC
O2 CONTENT: 11.3 ML/DL
O2 SATURATION: 96.5 % (ref 92–98.5)
O2HB: 95.2 % (ref 94–97)
OPERATOR ID: 2593
PATIENT TEMP: 37 C
PCO2: 34.5 MMHG (ref 35–45)
PEEP/CPAP: 8 CMH2O
PFO2: 2.2 MMHG/%
PH BLOOD GAS: 7.44 (ref 7.35–7.45)
PHOSPHATE SERPL-MCNC: 3.2 MG/DL (ref 2.5–4.5)
PLATELET # BLD AUTO: 251 K/UL (ref 130–450)
PMV BLD AUTO: 10.3 FL (ref 7–12)
PO2: 87.9 MMHG (ref 75–100)
POTASSIUM SERPL-SCNC: 4.2 MMOL/L (ref 3.5–5)
RBC # BLD AUTO: 3.52 M/UL (ref 3.8–5.8)
RI(T): 1.79
RR MECHANICAL: 20 B/MIN
SERVICE CMNT-IMP: NORMAL
SODIUM SERPL-SCNC: 143 MMOL/L (ref 132–146)
SOURCE, BLOOD GAS: ABNORMAL
SPECIMEN DESCRIPTION: NORMAL
THB: 8.3 G/DL (ref 11.5–16.5)
TIME ANALYZED: 457
VT MECHANICAL: 450 ML
WBC OTHER # BLD: 11.6 K/UL (ref 4.5–11.5)

## 2024-04-16 PROCEDURE — 2580000003 HC RX 258

## 2024-04-16 PROCEDURE — 99291 CRITICAL CARE FIRST HOUR: CPT | Performed by: INTERNAL MEDICINE

## 2024-04-16 PROCEDURE — 76937 US GUIDE VASCULAR ACCESS: CPT

## 2024-04-16 PROCEDURE — 80048 BASIC METABOLIC PNL TOTAL CA: CPT

## 2024-04-16 PROCEDURE — 71045 X-RAY EXAM CHEST 1 VIEW: CPT

## 2024-04-16 PROCEDURE — A4216 STERILE WATER/SALINE, 10 ML: HCPCS

## 2024-04-16 PROCEDURE — 94640 AIRWAY INHALATION TREATMENT: CPT

## 2024-04-16 PROCEDURE — 2580000003 HC RX 258: Performed by: INTERNAL MEDICINE

## 2024-04-16 PROCEDURE — 85027 COMPLETE CBC AUTOMATED: CPT

## 2024-04-16 PROCEDURE — C9113 INJ PANTOPRAZOLE SODIUM, VIA: HCPCS

## 2024-04-16 PROCEDURE — P9047 ALBUMIN (HUMAN), 25%, 50ML: HCPCS

## 2024-04-16 PROCEDURE — 2000000000 HC ICU R&B

## 2024-04-16 PROCEDURE — 87040 BLOOD CULTURE FOR BACTERIA: CPT

## 2024-04-16 PROCEDURE — 6370000000 HC RX 637 (ALT 250 FOR IP)

## 2024-04-16 PROCEDURE — 87205 SMEAR GRAM STAIN: CPT

## 2024-04-16 PROCEDURE — 6370000000 HC RX 637 (ALT 250 FOR IP): Performed by: NURSE PRACTITIONER

## 2024-04-16 PROCEDURE — 94003 VENT MGMT INPAT SUBQ DAY: CPT

## 2024-04-16 PROCEDURE — 82805 BLOOD GASES W/O2 SATURATION: CPT

## 2024-04-16 PROCEDURE — 6360000002 HC RX W HCPCS: Performed by: INTERNAL MEDICINE

## 2024-04-16 PROCEDURE — 6360000002 HC RX W HCPCS

## 2024-04-16 PROCEDURE — 2500000003 HC RX 250 WO HCPCS

## 2024-04-16 PROCEDURE — 6370000000 HC RX 637 (ALT 250 FOR IP): Performed by: INTERNAL MEDICINE

## 2024-04-16 PROCEDURE — 2500000003 HC RX 250 WO HCPCS: Performed by: INTERNAL MEDICINE

## 2024-04-16 PROCEDURE — 87077 CULTURE AEROBIC IDENTIFY: CPT

## 2024-04-16 PROCEDURE — 84100 ASSAY OF PHOSPHORUS: CPT

## 2024-04-16 PROCEDURE — 36410 VNPNXR 3YR/> PHY/QHP DX/THER: CPT

## 2024-04-16 PROCEDURE — 83735 ASSAY OF MAGNESIUM: CPT

## 2024-04-16 PROCEDURE — 2500000003 HC RX 250 WO HCPCS: Performed by: NURSE PRACTITIONER

## 2024-04-16 PROCEDURE — 87070 CULTURE OTHR SPECIMN AEROBIC: CPT

## 2024-04-16 PROCEDURE — 36415 COLL VENOUS BLD VENIPUNCTURE: CPT

## 2024-04-16 PROCEDURE — C1751 CATH, INF, PER/CENT/MIDLINE: HCPCS

## 2024-04-16 RX ORDER — SODIUM CHLORIDE 0.9 % (FLUSH) 0.9 %
5-40 SYRINGE (ML) INJECTION EVERY 12 HOURS SCHEDULED
Status: DISCONTINUED | OUTPATIENT
Start: 2024-04-16 | End: 2024-04-21 | Stop reason: HOSPADM

## 2024-04-16 RX ORDER — GUAIFENESIN 200 MG/10ML
200 LIQUID ORAL EVERY 6 HOURS
Status: DISCONTINUED | OUTPATIENT
Start: 2024-04-16 | End: 2024-04-20

## 2024-04-16 RX ORDER — HEPARIN 100 UNIT/ML
1 SYRINGE INTRAVENOUS PRN
Status: DISCONTINUED | OUTPATIENT
Start: 2024-04-16 | End: 2024-04-20

## 2024-04-16 RX ORDER — FENTANYL CITRATE 50 UG/ML
25 INJECTION, SOLUTION INTRAMUSCULAR; INTRAVENOUS ONCE
Status: DISCONTINUED | OUTPATIENT
Start: 2024-04-16 | End: 2024-04-19

## 2024-04-16 RX ORDER — SODIUM CHLORIDE 0.9 % (FLUSH) 0.9 %
5-40 SYRINGE (ML) INJECTION PRN
Status: DISCONTINUED | OUTPATIENT
Start: 2024-04-16 | End: 2024-04-21 | Stop reason: HOSPADM

## 2024-04-16 RX ORDER — ASPIRIN 81 MG/1
81 TABLET, CHEWABLE ORAL DAILY
Status: DISCONTINUED | OUTPATIENT
Start: 2024-04-16 | End: 2024-04-20

## 2024-04-16 RX ORDER — ENOXAPARIN SODIUM 100 MG/ML
40 INJECTION SUBCUTANEOUS DAILY
Status: DISCONTINUED | OUTPATIENT
Start: 2024-04-16 | End: 2024-04-20

## 2024-04-16 RX ORDER — ALBUMIN (HUMAN) 12.5 G/50ML
25 SOLUTION INTRAVENOUS EVERY 8 HOURS
Status: COMPLETED | OUTPATIENT
Start: 2024-04-16 | End: 2024-04-18

## 2024-04-16 RX ORDER — AMLODIPINE BESYLATE 5 MG/1
5 TABLET ORAL DAILY
Status: DISCONTINUED | OUTPATIENT
Start: 2024-04-16 | End: 2024-04-20

## 2024-04-16 RX ORDER — SODIUM CHLORIDE 9 MG/ML
INJECTION, SOLUTION INTRAVENOUS PRN
Status: DISCONTINUED | OUTPATIENT
Start: 2024-04-16 | End: 2024-04-21 | Stop reason: HOSPADM

## 2024-04-16 RX ORDER — BUMETANIDE 0.25 MG/ML
0.5 INJECTION INTRAMUSCULAR; INTRAVENOUS 2 TIMES DAILY
Status: COMPLETED | OUTPATIENT
Start: 2024-04-16 | End: 2024-04-17

## 2024-04-16 RX ORDER — POLYETHYLENE GLYCOL 3350 17 G/17G
17 POWDER, FOR SOLUTION ORAL DAILY
Status: DISCONTINUED | OUTPATIENT
Start: 2024-04-16 | End: 2024-04-20

## 2024-04-16 RX ADMIN — QUETIAPINE FUMARATE 50 MG: 25 TABLET ORAL at 21:03

## 2024-04-16 RX ADMIN — ALBUMIN (HUMAN) 25 G: 0.25 INJECTION, SOLUTION INTRAVENOUS at 17:24

## 2024-04-16 RX ADMIN — AMLODIPINE BESYLATE 5 MG: 5 TABLET ORAL at 21:03

## 2024-04-16 RX ADMIN — GUAIFENESIN 400 MG: 400 TABLET ORAL at 08:25

## 2024-04-16 RX ADMIN — BUMETANIDE 0.5 MG: 0.25 INJECTION INTRAMUSCULAR; INTRAVENOUS at 10:28

## 2024-04-16 RX ADMIN — VALPROATE SODIUM 250 MG: 100 INJECTION, SOLUTION INTRAVENOUS at 23:14

## 2024-04-16 RX ADMIN — ANTI-FUNGAL POWDER MICONAZOLE NITRATE TALC FREE: 1.42 POWDER TOPICAL at 08:24

## 2024-04-16 RX ADMIN — SODIUM CHLORIDE 100 MG: 9 INJECTION, SOLUTION INTRAVENOUS at 13:03

## 2024-04-16 RX ADMIN — POLYETHYLENE GLYCOL 3350 17 G: 17 POWDER, FOR SOLUTION ORAL at 10:14

## 2024-04-16 RX ADMIN — SODIUM CHLORIDE, PRESERVATIVE FREE 10 ML: 5 INJECTION INTRAVENOUS at 23:16

## 2024-04-16 RX ADMIN — ENOXAPARIN SODIUM 40 MG: 100 INJECTION SUBCUTANEOUS at 13:01

## 2024-04-16 RX ADMIN — ASPIRIN 81 MG: 81 TABLET, CHEWABLE ORAL at 21:03

## 2024-04-16 RX ADMIN — PIPERACILLIN AND TAZOBACTAM 4500 MG: 4; .5 INJECTION, POWDER, FOR SOLUTION INTRAVENOUS at 08:28

## 2024-04-16 RX ADMIN — ALBUMIN (HUMAN) 25 G: 0.25 INJECTION, SOLUTION INTRAVENOUS at 09:51

## 2024-04-16 RX ADMIN — IPRATROPIUM BROMIDE 0.5 MG: 0.5 SOLUTION RESPIRATORY (INHALATION) at 08:20

## 2024-04-16 RX ADMIN — DEXMEDETOMIDINE 0.8 MCG/KG/HR: 100 INJECTION, SOLUTION INTRAVENOUS at 10:12

## 2024-04-16 RX ADMIN — SODIUM CHLORIDE, PRESERVATIVE FREE 40 MG: 5 INJECTION INTRAVENOUS at 21:04

## 2024-04-16 RX ADMIN — SODIUM CHLORIDE 25 MG: 9 INJECTION, SOLUTION INTRAVENOUS at 12:05

## 2024-04-16 RX ADMIN — SODIUM CHLORIDE, PRESERVATIVE FREE 40 MG: 5 INJECTION INTRAVENOUS at 08:25

## 2024-04-16 RX ADMIN — BUDESONIDE 250 MCG: 0.25 INHALANT RESPIRATORY (INHALATION) at 20:43

## 2024-04-16 RX ADMIN — GUAIFENESIN 200 MG: 100 LIQUID ORAL at 21:03

## 2024-04-16 RX ADMIN — PIPERACILLIN AND TAZOBACTAM 4500 MG: 4; .5 INJECTION, POWDER, FOR SOLUTION INTRAVENOUS at 15:47

## 2024-04-16 RX ADMIN — GUAIFENESIN 400 MG: 400 TABLET ORAL at 13:02

## 2024-04-16 RX ADMIN — Medication 200 MCG/HR: at 05:45

## 2024-04-16 RX ADMIN — SODIUM CHLORIDE, PRESERVATIVE FREE 10 ML: 5 INJECTION INTRAVENOUS at 10:27

## 2024-04-16 RX ADMIN — ARFORMOTEROL TARTRATE 15 MCG: 15 SOLUTION RESPIRATORY (INHALATION) at 08:20

## 2024-04-16 RX ADMIN — ARFORMOTEROL TARTRATE 15 MCG: 15 SOLUTION RESPIRATORY (INHALATION) at 20:43

## 2024-04-16 RX ADMIN — BUDESONIDE 250 MCG: 0.25 INHALANT RESPIRATORY (INHALATION) at 08:20

## 2024-04-16 RX ADMIN — PIPERACILLIN AND TAZOBACTAM 4500 MG: 4; .5 INJECTION, POWDER, FOR SOLUTION INTRAVENOUS at 00:11

## 2024-04-16 RX ADMIN — IPRATROPIUM BROMIDE 0.5 MG: 0.5 SOLUTION RESPIRATORY (INHALATION) at 11:57

## 2024-04-16 RX ADMIN — Medication 75 MCG/HR: at 15:39

## 2024-04-16 RX ADMIN — CHLORHEXIDINE GLUCONATE 15 ML: 1.2 RINSE ORAL at 08:29

## 2024-04-16 RX ADMIN — IPRATROPIUM BROMIDE 0.5 MG: 0.5 SOLUTION RESPIRATORY (INHALATION) at 20:43

## 2024-04-16 RX ADMIN — Medication 200 MCG/HR: at 00:08

## 2024-04-16 RX ADMIN — QUETIAPINE FUMARATE 50 MG: 25 TABLET ORAL at 08:25

## 2024-04-16 RX ADMIN — IPRATROPIUM BROMIDE 0.5 MG: 0.5 SOLUTION RESPIRATORY (INHALATION) at 16:11

## 2024-04-16 RX ADMIN — VALPROATE SODIUM 1000 MG: 100 INJECTION, SOLUTION INTRAVENOUS at 10:27

## 2024-04-16 RX ADMIN — BUMETANIDE 0.5 MG: 0.25 INJECTION INTRAMUSCULAR; INTRAVENOUS at 18:38

## 2024-04-16 RX ADMIN — ATORVASTATIN CALCIUM 40 MG: 40 TABLET, FILM COATED ORAL at 08:24

## 2024-04-16 RX ADMIN — ANTI-FUNGAL POWDER MICONAZOLE NITRATE TALC FREE: 1.42 POWDER TOPICAL at 21:04

## 2024-04-16 RX ADMIN — DEXAMETHASONE SODIUM PHOSPHATE 6 MG: 10 INJECTION, SOLUTION INTRAMUSCULAR; INTRAVENOUS at 08:30

## 2024-04-16 RX ADMIN — CHLORHEXIDINE GLUCONATE 15 ML: 1.2 RINSE ORAL at 21:03

## 2024-04-16 ASSESSMENT — PULMONARY FUNCTION TESTS
PIF_VALUE: 28
PIF_VALUE: 30
PIF_VALUE: 18
PIF_VALUE: 28
PIF_VALUE: 39
PIF_VALUE: 35
PIF_VALUE: 32
PIF_VALUE: 32
PIF_VALUE: 31
PIF_VALUE: 28
PIF_VALUE: 33
PIF_VALUE: 32
PIF_VALUE: 41
PIF_VALUE: 32
PIF_VALUE: 34
PIF_VALUE: 33
PIF_VALUE: 39
PIF_VALUE: 16
PIF_VALUE: 32
PIF_VALUE: 31
PIF_VALUE: 34
PIF_VALUE: 31
PIF_VALUE: 29
PIF_VALUE: 33
PIF_VALUE: 15
PIF_VALUE: 31

## 2024-04-16 NOTE — PROGRESS NOTES
Patient was on PSV from 0842 until 1017   04/16/24 1017   Patient Observation   Pulse (!) 108   Respirations (!) 32   SpO2 99 %   Observations Patient placed back on AC to to tachycardia and tachypnea.   Vent Information   Vent Mode (S)  AC/VC   Ventilator Settings   Vt (Set, mL) 450 mL   FiO2  40 %   Resp Rate (Set) 20 bpm   PEEP/CPAP (cmH2O) 8   Vent Patient Data (Readings)   Vt (Measured) 620 mL   Peak Inspiratory Pressure (cmH2O) 41 cmH2O   Rate Measured 24 br/min   Minute Volume (L/min) 9.01 Liters   Mean Airway Pressure (cmH2O) 16 cmH20   Plateau Pressure (cm H2O) 18 cm H2O   Driving Pressure 10   I:E Ratio 1:1.70   Flow Sensitivity 3 L/min   Backup Apnea On   Backup Rate 20 Breaths Per Minute   Backup Vt 450   Vent Alarm Settings   Low Pressure (cmH2O)   (Red cord plugged into the wall)   High Pressure (cmH2O) 50 cmH2O   Low Minute Volume (lpm) 5 L/min   High Minute Volume (lpm) 16 L/min   Low Exhaled Vt (ml) 300 mL   High Exhaled Vt (ml) 900 mL   RR High (bpm) 35 br/min   Apnea (secs) 20 secs   Airway Clearance   Suction ET Tube   Subglottic Suction Done Yes   Suction Device Inline suction catheter   Sputum Method Obtained Endotracheal   Sputum Amount Moderate   Sputum Color/Odor Tan

## 2024-04-16 NOTE — PROGRESS NOTES
Department of Internal Medicine  Infectious Diseases  Progress  Note      C/C : Strep  Bacteremia , Pneumonia       Pt is intubated, awake and alert  No fever  Reports SOB     Current Facility-Administered Medications   Medication Dose Route Frequency Provider Last Rate Last Admin    albumin human 25% IV solution 25 g  25 g IntraVENous Q8H Reed Hernandez MD        bumetanide (BUMEX) injection 0.5 mg  0.5 mg IntraVENous BID Reed Hernandez MD        ferric gluconate (FERRLECIT) 25 mg in sodium chloride 0.9 % 50 mL (test dose) IVPB  25 mg IntraVENous Once Reed Hernandez MD        Followed by    ferric gluconate (FERRLECIT) 100 mg in sodium chloride 0.9 % 100 mL IVPB  100 mg IntraVENous Once Reed Hernandez MD        Followed by    [START ON 4/17/2024] ferric gluconate (FERRLECIT) 125 mg in sodium chloride 0.9 % 100 mL IVPB  125 mg IntraVENous Once Reed Hernandez MD        polyethylene glycol (GLYCOLAX) packet 17 g  17 g Oral Daily Reed Hernandez MD        dexmedeTOMIDine (PRECEDEX) 1,000 mcg in sodium chloride 0.9 % 250 mL infusion  0.1-1.5 mcg/kg/hr IntraVENous Continuous Raul Prakash MD        fentaNYL (SUBLIMAZE) injection 25 mcg  25 mcg IntraVENous Once Raul Prakash MD        arformoterol tartrate (BROVANA) nebulizer solution 15 mcg  15 mcg Nebulization BID RT Reed Hernandez MD   15 mcg at 04/16/24 0820    QUEtiapine (SEROQUEL) tablet 50 mg  50 mg Oral BID Reed Hernandez MD   50 mg at 04/16/24 0825    pantoprazole (PROTONIX) 40 mg in sodium chloride (PF) 0.9 % 10 mL injection  40 mg IntraVENous Q12H Lesly Tse APRN - CNP   40 mg at 04/16/24 0825    0.9 % sodium chloride infusion   IntraVENous PRN Lesly Tse APRN - CNP        midazolam PF (VERSED) injection 2 mg  2 mg IntraVENous Q4H PRN Lesly Tse APRN - CNP        fentaNYL (SUBLIMAZE) 1,000 mcg in sodium chloride 0.9% 100 mL infusion   mcg/hr IntraVENous Continuous Akshat Espana MD 5 mL/hr at 04/16/24 0929 50 mcg/hr at

## 2024-04-16 NOTE — PROGRESS NOTES
Coordination of care discussion and chart review with PM team. Pt seen in icu, he is intubated but awake. Wife and son in law present and are hopeful she can be extubated soon.

## 2024-04-16 NOTE — CARE COORDINATION
Care coordination: LOS 5 day, transfer from  on 4/13/24 for acute hypoxic and hypercapnic resp failure. Intubated, fentanyl, Precedex, Bumex, Zosyn, restraints. Nephrology for DEIDRE, ID for CAP, bacteremia. Per prev SW note, family was choiced for [facility] and referral was made and they are following.  Discharge needs unclear at this time.    Electronically signed by Mavis Shelby RN on 4/16/2024 at 12:58 PM

## 2024-04-16 NOTE — PROGRESS NOTES
Physical Therapy    PT consult to evaluate/treat received and appreciated.  Pt chart reviewed and evaluation attempted.  Pt is currently on hold d/t respiratory status.  Will check back as able.  Thank you.        August Logan, PT, DPT   [id number]

## 2024-04-16 NOTE — FLOWSHEET NOTE
Patient attempting to remove medical interventions when unrestrained. Family aware of two point soft restraint order.

## 2024-04-16 NOTE — PROGRESS NOTES
Patient placed on PSV 10 40% peep 8 at this time.    04/16/24 0842   Patient Observation   Pulse 73   Respirations 22   SpO2 97 %   Vent Information   Vent Mode (S)  CPAP/PS   Ventilator Settings   Vt (Set, mL) 450 mL   FiO2  40 %   Resp Rate (Set) 20 bpm   PEEP/CPAP (cmH2O) 8   Pressure Support (cm H2O) 10 cm H2O   Vent Patient Data (Readings)   Vt (Measured) 392 mL   Peak Inspiratory Pressure (cmH2O) 18 cmH2O   Rate Measured 19 br/min   Minute Volume (L/min) 7.35 Liters   Mean Airway Pressure (cmH2O) 12 cmH20   Plateau Pressure (cm H2O) 18 cm H2O   Driving Pressure 10   I:E Ratio 1:2.20   Backup Apnea On   Backup Rate 20 Breaths Per Minute   Backup Vt 450   Vent Alarm Settings   High Pressure (cmH2O) 50 cmH2O   Low Minute Volume (lpm) 5 L/min   High Minute Volume (lpm) 16 L/min   Low Exhaled Vt (ml) 300 mL   High Exhaled Vt (ml) 900 mL   RR High (bpm) 35 br/min   Apnea (secs) 20 secs

## 2024-04-16 NOTE — PROGRESS NOTES
Sedated unable to assess  Data:    Recent Labs     04/14/24  0409 04/14/24  1107 04/15/24  0409 04/16/24  0450   WBC 16.1*  --  13.2* 11.6*   HGB 6.7* 8.0* 7.4* 7.7*   HCT 24.2* 28.0* 25.1* 26.5*   MCV 73.8*  --  74.7* 75.3*     --  261 251       Recent Labs     04/14/24  0409 04/15/24  0400 04/16/24  0450    141 143   K 4.5 4.0 4.2   * 106 109*   CO2 24 24 21*   CREATININE 1.5* 1.6* 1.5*   BUN 42* 42* 39*   LABGLOM 45* 42* 48*   GLUCOSE 197* 153* 103*   CALCIUM 8.0* 7.9* 7.9*   PHOS 2.4* 3.2 3.2   MG 2.0 2.0 2.1       No results found for: \"VITD25\"    No results found for: \"PTH\"    No results for input(s): \"ALT\", \"AST\", \"ALKPHOS\", \"BILITOT\", \"BILIDIR\" in the last 72 hours.      No results for input(s): \"LABALBU\" in the last 72 hours.      Ferritin   Date Value Ref Range Status   04/15/2024 159 ng/mL Final     Comment:           FERRITIN Reference Ranges:  Adult Males   20 - 60 years:    30 - 400 ng/mL  Adult females 17 - 60 years:    13 - 150 ng/mL  Adults greater than 60 years:   no established reference range  Pediatrics:  no established reference range       Iron   Date Value Ref Range Status   04/15/2024 19 (L) 59 - 158 ug/dL Final     TIBC   Date Value Ref Range Status   04/15/2024 159 (L) 250 - 450 ug/dL Final       Vitamin B-12   Date Value Ref Range Status   04/14/2024 739 211 - 946 pg/mL Final       Folate   Date Value Ref Range Status   04/14/2024 2.1 (L) 4.8 - 24.2 ng/mL Final         Lab Results   Component Value Date/Time    COLORU Yellow 04/12/2024 09:00 PM    NITRU NEGATIVE 04/12/2024 09:00 PM    GLUCOSEU NEGATIVE 04/12/2024 09:00 PM    KETUA NEGATIVE 04/12/2024 09:00 PM    UROBILINOGEN 0.2 04/12/2024 09:00 PM    BILIRUBINUR NEGATIVE 04/12/2024 09:00 PM       Lab Results   Component Value Date/Time    LAURENCE 29 04/12/2024 09:00 PM       No components found for: \"URIC\"    No results found for: \"LIPIDPAN\"      Assessment and Plan:    1. DEIDRE   due to decreased effective renal perfusion

## 2024-04-16 NOTE — PROGRESS NOTES
OT consult received and appreciated. Chart reviewed and discussed case with RN. Will hold OT session this date d/t respiratory status/anxiety. Will evaluate at a later time. Thank you. Tamia Parmar, OTR/L # [id number]

## 2024-04-16 NOTE — PROGRESS NOTES
Speech Language Pathology  NAME:  Virgilio Sanchez  :  1940  DATE: 2024  ROOM:  [address] [place] [address]-A    Pt unavailable at 0715 for Clinical Swallow Evaluation services due to:    Patient intubated prior to evaluation being completed please reorder evaluation when medically appropriate.

## 2024-04-16 NOTE — PROGRESS NOTES
Patient agitated, restless, tachycardia, tachypnea fentanyl gtt restarted. SBT stopped. Dr. Olinda vásquez

## 2024-04-16 NOTE — PROGRESS NOTES
[facility]             Pulmonary & Critical Care Medicine                MICU Progress Note                 Written by: Raul Prakash MD  Name: Virgilio Sanchez : 1940       Age: 83 y.o. MR/Act #    : [id number],  Billing  #    : [id number]   Admit Date: 2024  2:16 PM LOS: 5,   Hospital Day: 6 Room #      : 4405/4405-A   PCP            : Jef Altamirano MD,   Referred by: Jef Altamirano MD ICU Attending: MD Pedro Boss date: 2024 4:12 PM   ICU Days:       3 Vent Days:     3 LOS: 5,                          Reason for ICU admission           Chief Complaint   Patient presents with    Shortness of Breath     Wife called EMS for SOB. EMS found patient 63% on room air. Patient 90% on 6L.     Leg Swelling     Bilateral lower leg edema                          Brief HPI, Presentation & Synopsis                       Virgilio Sanchez 83 y.o. male, with past medical history of CAD/stent, valvular heart disease, hypertension, COPD, chronic hypoxic respiratory failure, duodenal ulcer/massive GI bleeding 2018 requiring GDA embolization, prior alcohol abuse.  Patient presented with shortness of breath and altered mental status on .  Was seen by pulmonology, placed initially on noninvasive ventilation/AVAPS.  However due to lack of improvement clinically and on blood gas, patient was transferred to ICU for consideration of intubation and mechanical ventilation.  Chest x-ray showed left upper lobe airspace disease.  He was on antibiotics.  Blood cultures showing GPC in clusters.    Active problem list of yesterday:  Active Hospital Problems    Diagnosis Date Noted    Palliative care encounter [Z51.5] 2024    Moderate protein-calorie malnutrition (HCC) [E44.0] 2024    Hypoxia [R09.02] 2024    Pneumonia due to infectious organism [J18.9] 2024    Acute on chronic respiratory failure (HCC) [J96.20] 2024                          for this patient with life threatening, unstable organ failure, including direct patient contact, management of life support systems, review of data including imaging and labs, discussions with other team members and physicians is at least 35 Min so far today, excluding procedures.      Electronically signed by Raul Prakash MD on 4/16/2024 at 4:12 PM  Critical Care Medicine

## 2024-04-16 NOTE — CARE COORDINATION
LOS 5 day, transfer from  on 4/13/24 for acute hypoxic and hypercapnic resp failure. Intubated, fentanyl, Precedex, Bumex, Zosyn, restraints. Nephrology for DEIDRE, ID for CAP, bacteremia. Per prev SW note, family was choiced for [facility] and referral was made and they are following. Discharge needs unclear at this time.     Electronically signed by Mavis Shelby RN on 4/16/2024 at 12:59 PM

## 2024-04-16 NOTE — PROGRESS NOTES
Power midline double lumen Placement 4/16/2024    Product number: [id number]   Lot Number: [id number]      Ultrasound: yes   Left Basilic vein:                Upper Arm Circumference: (CM) 27cm    Size:(FR)/GUAGE 5.5    Exposed Length: (CM) 3cm    Internal Length: (CM) 12cm   Cut: (CM) 0cm   Vein Measurement: 0.57cm              Lidocaine Given: yes 1%  Linsey Reagan RN  4/16/2024  11:43 AM

## 2024-04-16 NOTE — PROGRESS NOTES
RT called to bedside. Patient placed back in AC/VC due to tachycardia and tachypnea. SBT ended at this time. SpO2 97%.

## 2024-04-16 NOTE — PROGRESS NOTES
Chris Brown M.D.,[facility]  Ish Lagunas D.O., F.MARY KATE.ALAN.OLARRY., [facility]  Ansley Everett M.D.  Mary Alice Chavez M.D.   EZEQUIEL CansecoO.        Daily Pulmonary Progress Note    Patient:  Virgilio Sanchez 83 y.o. male MRN: [id number]            Synopsis     We are following patient for respiratory failure, CAP    \"CC\" SOB    Code status: DNR CCA      Subjective      Patient was seen and examined in the ICU.  He remains intubated and sedated on fentanyl.  Family is present at the bedside.  ABGs this morning are good.  He has not been tolerating pressure support trials.  Echocardiogram completed showing EF 45 to 50% and grade 1 diastolic dysfunction.  Thick pink-tinged secretions are noted in the suction tubing.    Review of Systems:  Unable to obtain-intubated, sedated    24-hour events:  No new events    Objective   OBJECTIVE:   BP (!) 153/86   Pulse (!) 108   Temp 98.4 °F (36.9 °C)   Resp (!) 32   Ht 1.829 m (6')   Wt 74 kg (163 lb 2.3 oz)   SpO2 99%   BMI 22.13 kg/m²   SpO2 Readings from Last 1 Encounters:   04/16/24 99%        I/O:    Intake/Output Summary (Last 24 hours) at 4/16/2024 1109  Last data filed at 4/16/2024 1000  Gross per 24 hour   Intake 2434.27 ml   Output 1230 ml   Net 1204.27 ml       Vent Information  Ventilator ID: [id number]  Equipment Changed: Humidification  Ventilator Initiate: Yes  Vent Mode: (S) AC/VC       IPAP: 15 cmH20  CPAP/EPAP: 6 cmH2O     CURRENT MEDS :  Scheduled Meds:   albumin human 25%  25 g IntraVENous Q8H    bumetanide  0.5 mg IntraVENous BID    ferric gluconate (FERRLECIT) 25 mg in sodium chloride 0.9 % 50 mL (test dose) IVPB  25 mg IntraVENous Once    Followed by    ferric gluconate (FERRLECIT) 100 mg in sodium chloride 0.9 % 100 mL IVPB  100 mg IntraVENous Once    Followed by    [START ON 4/17/2024] ferric gluconate (FERRLECIT) 125 mg in sodium chloride 0.9 % 100 mL IVPB  125 mg IntraVENous Once    polyethylene glycol  17 g Oral Daily    fentanNYL  25 mcg IntraVENous

## 2024-04-17 ENCOUNTER — APPOINTMENT (OUTPATIENT)
Dept: GENERAL RADIOLOGY | Age: 84
End: 2024-04-17
Payer: MEDICARE

## 2024-04-17 LAB
AADO2: 155.1 MMHG
ANION GAP SERPL CALCULATED.3IONS-SCNC: 13 MMOL/L (ref 7–16)
B.E.: -0.4 MMOL/L (ref -3–3)
BUN SERPL-MCNC: 35 MG/DL (ref 6–23)
CALCIUM SERPL-MCNC: 8.2 MG/DL (ref 8.6–10.2)
CHLORIDE SERPL-SCNC: 110 MMOL/L (ref 98–107)
CO2 SERPL-SCNC: 24 MMOL/L (ref 22–29)
COHB: 0.4 % (ref 0–1.5)
CREAT SERPL-MCNC: 1.3 MG/DL (ref 0.7–1.2)
CRITICAL: ABNORMAL
DATE ANALYZED: ABNORMAL
DATE LAST DOSE: ABNORMAL
DATE OF COLLECTION: ABNORMAL
ERYTHROCYTE [DISTWIDTH] IN BLOOD BY AUTOMATED COUNT: 21.3 % (ref 11.5–15)
FIO2: 40 %
GFR SERPL CREATININE-BSD FRML MDRD: 54 ML/MIN/1.73M2
GLUCOSE SERPL-MCNC: 106 MG/DL (ref 74–99)
HCO3: 24.3 MMOL/L (ref 22–26)
HCT VFR BLD AUTO: 27.9 % (ref 37–54)
HGB BLD-MCNC: 8.1 G/DL (ref 12.5–16.5)
HHB: 4.6 % (ref 0–5)
LAB: ABNORMAL
Lab: 430
MAGNESIUM SERPL-MCNC: 1.9 MG/DL (ref 1.6–2.6)
MCH RBC QN AUTO: 21.8 PG (ref 26–35)
MCHC RBC AUTO-ENTMCNC: 29 G/DL (ref 32–34.5)
MCV RBC AUTO: 75.2 FL (ref 80–99.9)
METHB: 0.2 % (ref 0–1.5)
MODE: AC
O2 CONTENT: 12.1 ML/DL
O2 SATURATION: 95.4 % (ref 92–98.5)
O2HB: 94.8 % (ref 94–97)
OPERATOR ID: 2593
PATIENT TEMP: 37 C
PCO2: 39.5 MMHG (ref 35–45)
PEEP/CPAP: 8 CMH2O
PFO2: 2.12 MMHG/%
PH BLOOD GAS: 7.41 (ref 7.35–7.45)
PHOSPHATE SERPL-MCNC: 3.2 MG/DL (ref 2.5–4.5)
PLATELET # BLD AUTO: 223 K/UL (ref 130–450)
PMV BLD AUTO: 9.9 FL (ref 7–12)
PO2: 84.7 MMHG (ref 75–100)
POTASSIUM SERPL-SCNC: 3.3 MMOL/L (ref 3.5–5)
RBC # BLD AUTO: 3.71 M/UL (ref 3.8–5.8)
RI(T): 1.83
RR MECHANICAL: 20 B/MIN
SODIUM SERPL-SCNC: 147 MMOL/L (ref 132–146)
SOURCE, BLOOD GAS: ABNORMAL
THB: 9 G/DL (ref 11.5–16.5)
TIME ANALYZED: 432
TME LAST DOSE: ABNORMAL H
VALPROATE SERPL-MCNC: 29 UG/ML (ref 50–100)
VANCOMYCIN DOSE: ABNORMAL MG
VT MECHANICAL: 450 ML
WBC OTHER # BLD: 11.4 K/UL (ref 4.5–11.5)

## 2024-04-17 PROCEDURE — 85027 COMPLETE CBC AUTOMATED: CPT

## 2024-04-17 PROCEDURE — 6370000000 HC RX 637 (ALT 250 FOR IP): Performed by: INTERNAL MEDICINE

## 2024-04-17 PROCEDURE — 82805 BLOOD GASES W/O2 SATURATION: CPT

## 2024-04-17 PROCEDURE — 6360000002 HC RX W HCPCS: Performed by: NURSE PRACTITIONER

## 2024-04-17 PROCEDURE — 71045 X-RAY EXAM CHEST 1 VIEW: CPT

## 2024-04-17 PROCEDURE — 83735 ASSAY OF MAGNESIUM: CPT

## 2024-04-17 PROCEDURE — 6370000000 HC RX 637 (ALT 250 FOR IP): Performed by: NURSE PRACTITIONER

## 2024-04-17 PROCEDURE — 2580000003 HC RX 258: Performed by: INTERNAL MEDICINE

## 2024-04-17 PROCEDURE — 84100 ASSAY OF PHOSPHORUS: CPT

## 2024-04-17 PROCEDURE — 6360000002 HC RX W HCPCS: Performed by: INTERNAL MEDICINE

## 2024-04-17 PROCEDURE — 94640 AIRWAY INHALATION TREATMENT: CPT

## 2024-04-17 PROCEDURE — P9047 ALBUMIN (HUMAN), 25%, 50ML: HCPCS

## 2024-04-17 PROCEDURE — 80164 ASSAY DIPROPYLACETIC ACD TOT: CPT

## 2024-04-17 PROCEDURE — 2500000003 HC RX 250 WO HCPCS: Performed by: INTERNAL MEDICINE

## 2024-04-17 PROCEDURE — 99291 CRITICAL CARE FIRST HOUR: CPT | Performed by: INTERNAL MEDICINE

## 2024-04-17 PROCEDURE — C9113 INJ PANTOPRAZOLE SODIUM, VIA: HCPCS

## 2024-04-17 PROCEDURE — 2580000003 HC RX 258

## 2024-04-17 PROCEDURE — 94799 UNLISTED PULMONARY SVC/PX: CPT

## 2024-04-17 PROCEDURE — A4216 STERILE WATER/SALINE, 10 ML: HCPCS

## 2024-04-17 PROCEDURE — 80048 BASIC METABOLIC PNL TOTAL CA: CPT

## 2024-04-17 PROCEDURE — 6360000002 HC RX W HCPCS

## 2024-04-17 PROCEDURE — 2500000003 HC RX 250 WO HCPCS

## 2024-04-17 PROCEDURE — 2000000000 HC ICU R&B

## 2024-04-17 PROCEDURE — 99231 SBSQ HOSP IP/OBS SF/LOW 25: CPT

## 2024-04-17 PROCEDURE — 94003 VENT MGMT INPAT SUBQ DAY: CPT

## 2024-04-17 PROCEDURE — 2500000003 HC RX 250 WO HCPCS: Performed by: NURSE PRACTITIONER

## 2024-04-17 PROCEDURE — 6370000000 HC RX 637 (ALT 250 FOR IP)

## 2024-04-17 RX ORDER — ISOSORBIDE DINITRATE 10 MG/1
20 TABLET ORAL 3 TIMES DAILY
Status: DISCONTINUED | OUTPATIENT
Start: 2024-04-18 | End: 2024-04-20

## 2024-04-17 RX ORDER — MAGNESIUM SULFATE 1 G/100ML
1000 INJECTION INTRAVENOUS ONCE
Status: COMPLETED | OUTPATIENT
Start: 2024-04-17 | End: 2024-04-17

## 2024-04-17 RX ADMIN — IPRATROPIUM BROMIDE 0.5 MG: 0.5 SOLUTION RESPIRATORY (INHALATION) at 08:41

## 2024-04-17 RX ADMIN — BUDESONIDE 250 MCG: 0.25 INHALANT RESPIRATORY (INHALATION) at 20:09

## 2024-04-17 RX ADMIN — EPOETIN ALFA-EPBX 2000 UNITS: 2000 INJECTION, SOLUTION INTRAVENOUS; SUBCUTANEOUS at 17:07

## 2024-04-17 RX ADMIN — POLYETHYLENE GLYCOL 3350 17 G: 17 POWDER, FOR SOLUTION ORAL at 08:56

## 2024-04-17 RX ADMIN — GUAIFENESIN 200 MG: 100 LIQUID ORAL at 08:56

## 2024-04-17 RX ADMIN — DEXAMETHASONE SODIUM PHOSPHATE 6 MG: 10 INJECTION, SOLUTION INTRAMUSCULAR; INTRAVENOUS at 08:57

## 2024-04-17 RX ADMIN — ATORVASTATIN CALCIUM 40 MG: 40 TABLET, FILM COATED ORAL at 08:56

## 2024-04-17 RX ADMIN — PIPERACILLIN AND TAZOBACTAM 4500 MG: 4; .5 INJECTION, POWDER, FOR SOLUTION INTRAVENOUS at 16:45

## 2024-04-17 RX ADMIN — SODIUM CHLORIDE, PRESERVATIVE FREE 10 ML: 5 INJECTION INTRAVENOUS at 19:59

## 2024-04-17 RX ADMIN — QUETIAPINE FUMARATE 50 MG: 25 TABLET ORAL at 08:56

## 2024-04-17 RX ADMIN — GUAIFENESIN 200 MG: 100 LIQUID ORAL at 15:00

## 2024-04-17 RX ADMIN — GUAIFENESIN 200 MG: 100 LIQUID ORAL at 19:38

## 2024-04-17 RX ADMIN — ALBUMIN (HUMAN) 25 G: 0.25 INJECTION, SOLUTION INTRAVENOUS at 09:39

## 2024-04-17 RX ADMIN — BUMETANIDE 0.5 MG: 0.25 INJECTION INTRAMUSCULAR; INTRAVENOUS at 08:56

## 2024-04-17 RX ADMIN — ARFORMOTEROL TARTRATE 15 MCG: 15 SOLUTION RESPIRATORY (INHALATION) at 08:41

## 2024-04-17 RX ADMIN — ALBUMIN (HUMAN) 25 G: 0.25 INJECTION, SOLUTION INTRAVENOUS at 17:54

## 2024-04-17 RX ADMIN — VALPROATE SODIUM 250 MG: 100 INJECTION, SOLUTION INTRAVENOUS at 13:17

## 2024-04-17 RX ADMIN — SODIUM CHLORIDE, PRESERVATIVE FREE 40 MG: 5 INJECTION INTRAVENOUS at 19:38

## 2024-04-17 RX ADMIN — BUMETANIDE 0.5 MG: 0.25 INJECTION INTRAMUSCULAR; INTRAVENOUS at 17:54

## 2024-04-17 RX ADMIN — ISOSORBIDE MONONITRATE 60 MG: 30 TABLET, EXTENDED RELEASE ORAL at 08:56

## 2024-04-17 RX ADMIN — SODIUM CHLORIDE 125 MG: 9 INJECTION, SOLUTION INTRAVENOUS at 10:50

## 2024-04-17 RX ADMIN — IPRATROPIUM BROMIDE 0.5 MG: 0.5 SOLUTION RESPIRATORY (INHALATION) at 20:09

## 2024-04-17 RX ADMIN — BUDESONIDE 250 MCG: 0.25 INHALANT RESPIRATORY (INHALATION) at 08:41

## 2024-04-17 RX ADMIN — PIPERACILLIN AND TAZOBACTAM 4500 MG: 4; .5 INJECTION, POWDER, FOR SOLUTION INTRAVENOUS at 23:51

## 2024-04-17 RX ADMIN — DEXMEDETOMIDINE 0.6 MCG/KG/HR: 100 INJECTION, SOLUTION INTRAVENOUS at 05:28

## 2024-04-17 RX ADMIN — MAGNESIUM SULFATE HEPTAHYDRATE 1000 MG: 1 INJECTION, SOLUTION INTRAVENOUS at 06:57

## 2024-04-17 RX ADMIN — ANTI-FUNGAL POWDER MICONAZOLE NITRATE TALC FREE: 1.42 POWDER TOPICAL at 09:52

## 2024-04-17 RX ADMIN — PIPERACILLIN AND TAZOBACTAM 4500 MG: 4; .5 INJECTION, POWDER, FOR SOLUTION INTRAVENOUS at 09:27

## 2024-04-17 RX ADMIN — ASPIRIN 81 MG: 81 TABLET, CHEWABLE ORAL at 08:55

## 2024-04-17 RX ADMIN — CHLORHEXIDINE GLUCONATE 15 ML: 1.2 RINSE ORAL at 08:56

## 2024-04-17 RX ADMIN — AMLODIPINE BESYLATE 5 MG: 5 TABLET ORAL at 08:56

## 2024-04-17 RX ADMIN — POTASSIUM BICARBONATE 40 MEQ: 782 TABLET, EFFERVESCENT ORAL at 08:56

## 2024-04-17 RX ADMIN — PIPERACILLIN AND TAZOBACTAM 4500 MG: 4; .5 INJECTION, POWDER, FOR SOLUTION INTRAVENOUS at 00:11

## 2024-04-17 RX ADMIN — QUETIAPINE FUMARATE 50 MG: 25 TABLET ORAL at 19:38

## 2024-04-17 RX ADMIN — SODIUM CHLORIDE, PRESERVATIVE FREE 10 ML: 5 INJECTION INTRAVENOUS at 09:52

## 2024-04-17 RX ADMIN — IPRATROPIUM BROMIDE 0.5 MG: 0.5 SOLUTION RESPIRATORY (INHALATION) at 11:43

## 2024-04-17 RX ADMIN — ALBUMIN (HUMAN) 25 G: 0.25 INJECTION, SOLUTION INTRAVENOUS at 02:38

## 2024-04-17 RX ADMIN — ANTI-FUNGAL POWDER MICONAZOLE NITRATE TALC FREE: 1.42 POWDER TOPICAL at 19:59

## 2024-04-17 RX ADMIN — GUAIFENESIN 200 MG: 100 LIQUID ORAL at 02:58

## 2024-04-17 RX ADMIN — ENOXAPARIN SODIUM 40 MG: 100 INJECTION SUBCUTANEOUS at 08:56

## 2024-04-17 RX ADMIN — MIDAZOLAM 2 MG: 1 INJECTION INTRAMUSCULAR; INTRAVENOUS at 04:13

## 2024-04-17 RX ADMIN — ARFORMOTEROL TARTRATE 15 MCG: 15 SOLUTION RESPIRATORY (INHALATION) at 20:09

## 2024-04-17 RX ADMIN — Medication 100 MCG/HR: at 05:27

## 2024-04-17 RX ADMIN — CHLORHEXIDINE GLUCONATE 15 ML: 1.2 RINSE ORAL at 19:38

## 2024-04-17 RX ADMIN — VALPROATE SODIUM 250 MG: 100 INJECTION, SOLUTION INTRAVENOUS at 22:48

## 2024-04-17 RX ADMIN — IPRATROPIUM BROMIDE 0.5 MG: 0.5 SOLUTION RESPIRATORY (INHALATION) at 16:12

## 2024-04-17 RX ADMIN — POTASSIUM BICARBONATE 40 MEQ: 782 TABLET, EFFERVESCENT ORAL at 19:38

## 2024-04-17 RX ADMIN — SODIUM CHLORIDE, PRESERVATIVE FREE 40 MG: 5 INJECTION INTRAVENOUS at 08:57

## 2024-04-17 ASSESSMENT — PULMONARY FUNCTION TESTS
PIF_VALUE: 17
PIF_VALUE: 35
PIF_VALUE: 43
PIF_VALUE: 35
PIF_VALUE: 19
PIF_VALUE: 17
PIF_VALUE: 17
PIF_VALUE: 28
PIF_VALUE: 29
PIF_VALUE: 17
PIF_VALUE: 36
PIF_VALUE: 17
PIF_VALUE: 39
PIF_VALUE: 27
PIF_VALUE: 17
PIF_VALUE: 17
PIF_VALUE: 42
PIF_VALUE: 28
PIF_VALUE: 11
PIF_VALUE: 35
PIF_VALUE: 20
PIF_VALUE: 29
PIF_VALUE: 17
PIF_VALUE: 27
PIF_VALUE: 29
PIF_VALUE: 46

## 2024-04-17 ASSESSMENT — PAIN SCALES - GENERAL
PAINLEVEL_OUTOF10: 0

## 2024-04-17 NOTE — PROGRESS NOTES
Physical Therapy    PT consult to evaluate/treat received and appreciated.  Pt chart reviewed and evaluation attempted.  Pt is currently on hold for potential extubation at time of attempt.  Will check back as able.  Thank you.        August Logan, PT, DPT   [id number]

## 2024-04-17 NOTE — PROGRESS NOTES
Nephrology Progress Note  [facility]      CC:       HPI:   The patient is an 83 year old male with a past medical history that includes COPD, duodenal ulcer, CAD with cardiac stenting, HTN, HLD, valvular heart disease, DVT, arthritis. The patient presented to the ED on 4/11 with c/o shortness of breath with cough and fever that started a few days ago. The patient's VS in the ED were /67  Pulse 98  Temp 98.6 °F (37 °C) (Axillary)  Resp 25  SpO2 97% . His initial labs in the ED were significant for K+ 6.2, CO@ 23, BUN 42, Cr 1.9, GFR 35, mg 1.7, PO4 7.7, BNP 5441, troponin 105, WBC 26, hgb 8.2. The patient's CXR noted right airspace density likely pneumonia. The patient received treatment for hyperkalemia and received ceftriaxone and doxycycline. The patient was found unresponsive upon arrival to the sixth floor from the ED and an RRT was called and the patient was found to have hypercapneic ABG results and was placed on AVAPS. Upon assessment today the patient is easily aroused and attempts to answer questions but is disoriented and difficult to understand. Remains on NIV.     Interval History:    4/14: Seen and examined.  Patient was transferred to the medical intensive care unit last evening and was intubated due to persistent hypercarbia.  Making acceptable urine output.  Sedated.  Discussed with wife and nurse at bedside.  No further events this morning.    4/15/24: pt seen and examined in icu, chart reviewed. Intubated, family at bedside    4/16: pt seen in icu, remains intubated.     4/17: pt seen in icu, intubated, 2 visitors present, pt more alert      PMH:          Past Medical History:   Diagnosis Date    Arthritis     CAD (coronary artery disease)     COPD (chronic obstructive pulmonary disease) (HCC)     Duodenal ulcer 4/12/2018    Hyperlipidemia     Hypertension     Valvular heart disease        Patient Active Problem List   Diagnosis    HCAP (healthcare-associated pneumonia)    Melena     creatinine down to 1.5 -1.6>1.3  FENa 0.7 % supports pre-renal etiology  U/a neg for UTI  Hold ACEi  Strict I&O  Avoid nephrotoxins  Uo 2 L  Monitor daily BMP    2. Acute on chronic respiratory failure/CAP  [facility] of [facility]  Transferred to the intensive care unit intubated for persistent hypercarbia  Monitor ABG  On bumex q 12    3. AMS   secondary to acute on chronic hypercapnic respiratory failure    4. Hyperkalemia in the setting of DEIDRE  Improved    5. Hypertension  BP target <130/80  Lisinopril on hold  On metoprolol, amlodipine    6. Hypernatremia  Secondary to prolonged n.p.o. state  Will inc free water in tf    7. Anemia  Check iron studies, folate, B12  Transfuse <7.0  Status post 1 unit packed red cells  Start miguelito      Patricio Bowen MD

## 2024-04-17 NOTE — PROGRESS NOTES
OT consult received and appreciated. Chart reviewed and discussed case with RN. Will hold OT session this date for possible extubation. Will evaluate at a later time. Thank you. Tamia Parmar, OTR/L # [id number]

## 2024-04-17 NOTE — PROGRESS NOTES
Spontaneous Parameters performed    VT = 295 ml  f = 33  B/M  Ve = 11.4 L/M  NIF = -20  cmH2O  VC = .366 L  RSBI = 112    Parameters obtained on PS 5 PEEP 5  Pt was able to follow commands and parameters are based on pt's best efforts.     Performed by Missy Aldridge RCP

## 2024-04-17 NOTE — PLAN OF CARE
Problem: Safety - Adult  Goal: Free from fall injury  4/17/2024 0634 by Terese Snell RN  Outcome: Not Progressing     Problem: Discharge Planning  Goal: Discharge to home or other facility with appropriate resources  4/17/2024 0634 by Terese Snell RN  Outcome: Not Progressing     Problem: Safety - Medical Restraint  Goal: Remains free of injury from restraints (Restraint for Interference with Medical Device)  Description: INTERVENTIONS:  1. Determine that other, less restrictive measures have been tried or would not be effective before applying the restraint  2. Evaluate the patient's condition at the time of restraint application  3. Inform patient/family regarding the reason for restraint  4. Q2H: Monitor safety, psychosocial status, comfort, nutrition and hydration  4/17/2024 0739 by Mounika Alvarado RN  Outcome: Not Progressing  4/17/2024 0634 by Terese Snell RN  Outcome: Not Progressing  Flowsheets  Taken 4/17/2024 0600 by Terese Snell RN  Remains free of injury from restraints (restraint for interference with medical device): Every 2 hours: Monitor safety, psychosocial status, comfort, nutrition and hydration  Taken 4/16/2024 2000 by Terese Snell RN  Remains free of injury from restraints (restraint for interference with medical device):   Determine that other, less restrictive measures have been tried or would not be effective before applying the restraint   Evaluate the patient's condition at the time of restraint application   Inform patient/family regarding the reason for restraint   Every 2 hours: Monitor safety, psychosocial status, comfort, nutrition and hydration  Taken 4/16/2024 1800 by Dmitri Garcia RN  Remains free of injury from restraints (restraint for interference with medical device):   Determine that other, less restrictive measures have been tried or would not be effective before applying the restraint   Evaluate the patient's condition at the time of restraint application   Inform

## 2024-04-17 NOTE — PROGRESS NOTES
Chris Brown M.D.,[facility]  Ish Lagunas D.O., POLLY.TAQUERIA., [facility]  Ansley Everett M.D.  Mary Alice Chavez M.D.   EZEQUIEL CansecoO.        Daily Pulmonary Progress Note    Patient:  Virgilio Sanchez 83 y.o. male MRN: [id number]            Synopsis     We are following patient for respiratory failure, CAP    \"CC\" SOB    Code status: DNR CCA      Subjective      Patient was seen and examined in the ICU.  Family present at the bedside. Still intubated and sedated with precedex. Not doing well will PSV. Currently on PSV 8 with tidal volumes barely reaching 300. Respiratory culture growing pseudomonas.    Review of Systems:  Unable to obtain-intubated, sedated    24-hour events:  No new events    Objective   OBJECTIVE:   /68   Pulse 63   Temp 98 °F (36.7 °C)   Resp (!) 31   Ht 1.829 m (6')   Wt 74 kg (163 lb 2.3 oz)   SpO2 98%   BMI 22.13 kg/m²   SpO2 Readings from Last 1 Encounters:   04/17/24 98%        I/O:    Intake/Output Summary (Last 24 hours) at 4/17/2024 1527  Last data filed at 4/17/2024 1252  Gross per 24 hour   Intake 2019.48 ml   Output 2250 ml   Net -230.52 ml       Vent Information  Ventilator ID: [id number]  Equipment Changed: Humidification  Ventilator Initiate: Yes  Vent Mode: CPAP/PS       IPAP: 15 cmH20  CPAP/EPAP: 6 cmH2O     CURRENT MEDS :  Scheduled Meds:   potassium bicarb-citric acid  40 mEq Oral BID    [START ON 4/18/2024] isosorbide dinitrate  20 mg Oral TID    albumin human 25%  25 g IntraVENous Q8H    bumetanide  0.5 mg IntraVENous BID    polyethylene glycol  17 g Oral Daily    fentanNYL  25 mcg IntraVENous Once    sodium chloride flush  5-40 mL IntraVENous 2 times per day    epoetin ramses-epbx  2,000 Units SubCUTAneous Once per day on Mon Wed Fri    enoxaparin  40 mg SubCUTAneous Daily    valproate (DEPACON) 250 mg in sodium chloride 0.9 % 100 mL IVPB  250 mg IntraVENous Q12H    amLODIPine  5 mg Oral Daily    aspirin  81 mg Oral Daily    guaiFENesin  200 mg Per NG tube Q6H     4/15/2024:  Interpretation Summary    Left Ventricle: Mildly reduced left ventricular systolic function with visually estimated EF of 45 - 50%. Left ventricle size is normal. Mild concentric hypertrophy. Normal wall motion. Grade I diastolic dysfunction.    Right Ventricle: Normal systolic function., TAPSE 2.1cm.    Aortic Valve: Trace to mild regurgitation. Paradoxical low gradient moderate aortic stenosis - peak gradient 37mmHg, mean 19mmHg, SHANNON 1cm2 by VTI, 1.1cm2 by Vmax; DLI 0.34.    Tricuspid Valve: Trace regurgitation., RVSP estimated at 44mmHg.    Prior study done in 1/2023.    Labs:  Lab Results   Component Value Date/Time    WBC 11.4 04/17/2024 05:01 AM    RBC 3.71 04/17/2024 05:01 AM    HGB 8.1 04/17/2024 05:01 AM    HCT 27.9 04/17/2024 05:01 AM    MCV 75.2 04/17/2024 05:01 AM    MCH 21.8 04/17/2024 05:01 AM    MCHC 29.0 04/17/2024 05:01 AM    RDW 21.3 04/17/2024 05:01 AM     04/17/2024 05:01 AM    MPV 9.9 04/17/2024 05:01 AM     Lab Results   Component Value Date/Time     04/17/2024 05:01 AM    K 3.3 04/17/2024 05:01 AM    K 3.9 03/09/2018 05:10 AM     04/17/2024 05:01 AM    CO2 24 04/17/2024 05:01 AM    BUN 35 04/17/2024 05:01 AM    CREATININE 1.3 04/17/2024 05:01 AM    CALCIUM 8.2 04/17/2024 05:01 AM    GFRAA >60 03/14/2018 06:17 AM    LABGLOM 54 04/17/2024 05:01 AM     Lab Results   Component Value Date/Time    PROTIME 15.7 04/11/2024 03:07 PM    INR 1.4 04/11/2024 03:07 PM       Micro:  Component    Specimen Description .RESPIRATORY P   Direct Exam MANY Polymorphonuclear leukocytes P    RARE EPITHELIAL CELLS P    RARE YEAST Abnormal  P    FEW GRAM NEGATIVE RODS Abnormal  P   Culture Oxidase Positive Gram Negative Gary HEAVY GROWTH Identification and sensitivity to follow. Abnormal  P    NO NORMAL CLARITA Abnormal  P   Resulting Agency [facility]              Specimen Collected: 04/16/24 06:30 EDT Last Resulted: 04/17/24 09:39 EDT             ABGs 4/17/24:

## 2024-04-17 NOTE — PROGRESS NOTES
Department of Internal Medicine  Infectious Diseases  Progress  Note      C/C : Strep  Bacteremia , Pneumonia       Pt is intubated, awake and alert  No fever  Reports SOB     Current Facility-Administered Medications   Medication Dose Route Frequency Provider Last Rate Last Admin    potassium bicarb-citric acid (EFFER-K) effervescent tablet 40 mEq  40 mEq Oral BID Emily Norris APRBHARGAVI - CNP   40 mEq at 04/17/24 0856    [START ON 4/18/2024] isosorbide dinitrate (ISORDIL) tablet 20 mg  20 mg Oral TID Raul Prakash MD        albumin human 25% IV solution 25 g  25 g IntraVENous Q8H Reed Hernandez MD   Stopped at 04/17/24 1039    bumetanide (BUMEX) injection 0.5 mg  0.5 mg IntraVENous BID Reed Hernandez MD   0.5 mg at 04/17/24 0856    ferric gluconate (FERRLECIT) 125 mg in sodium chloride 0.9 % 100 mL IVPB  125 mg IntraVENous Once Reed Hernandez  mL/hr at 04/17/24 1050 125 mg at 04/17/24 1050    polyethylene glycol (GLYCOLAX) packet 17 g  17 g Oral Daily Reed Hernandez MD   17 g at 04/17/24 0856    dexmedeTOMIDine (PRECEDEX) 1,000 mcg in sodium chloride 0.9 % 250 mL infusion  0.1-1.5 mcg/kg/hr IntraVENous Continuous Raul Prakash MD 11.1 mL/hr at 04/17/24 0641 0.6 mcg/kg/hr at 04/17/24 0641    fentaNYL (SUBLIMAZE) injection 25 mcg  25 mcg IntraVENous Once Raul Prakash MD        sodium chloride flush 0.9 % injection 5-40 mL  5-40 mL IntraVENous 2 times per day Reed Hernandez MD   10 mL at 04/17/24 0952    sodium chloride flush 0.9 % injection 5-40 mL  5-40 mL IntraVENous PRN Reed Hernandez MD        0.9 % sodium chloride infusion   IntraVENous PRN Reed Hernandez MD        heparin (PF) 100 UNIT/ML injection 100 Units  1 mL IntraCATHeter PRN Reed Hernandez MD        epoetin ramses-epbx (RETACRIT) injection 2,000 Units  2,000 Units SubCUTAneous Once per day on Mon Wed Fri Patricio Bowen MD        enoxaparin (LOVENOX) injection 40 mg  40 mg SubCUTAneous Daily Reed Hernandez MD   40 mg at 04/17/24 0856    valproate

## 2024-04-17 NOTE — PROGRESS NOTES
Palliative Care Department  [phone]  Palliative Care Progress Note  Provider Christine Bowen, APRN - CNP     Virgilio Sanchez  [id number]  Hospital Day: 7  Date of Initial Consult: 4/13/2024  Referring Provider:  Noel Peters DO  Palliative Medicine was consulted for assistance with: goals of care, code status discussion    HPI:   Virgilio Sanchez is a 83 y.o. with a medical history of severe COPD, chronic hypoxic respiratory failure, coronary artery disease disease with cardiac stenting, nicotine dependence in remission since 2018 with 50-pack-year history of smoking, DVT valvular heart disease, nonrheumatic aortic valve stenosis, hypertension hyperlipidemia prior alcohol abuse recovering alcoholic with a massive GI bleed requiring embolization of the gastroduodenal artery in 2018 who was admitted on 4/11/2024 from home with a CHIEF COMPLAINT of SOB, AMS. Was seen by pulmonology, placed initially on noninvasive ventilation/AVAPS.  However due to lack of improvement clinically and on blood gas, patient was transferred to ICU for consideration of intubation and mechanical ventilation. Chest x-ray showed left upper lobe airspace disease. Nephrology following. Palliative medicine consulted for further assistance.     ASSESSMENT/PLAN:     Pertinent Hospital Diagnoses     Acute hypoxic hypercapnic respiratory failure  Left upper lobe pneumonia  COPD exacerbation   Sepsis  GPC bacteremia   Acute metabolic encephalopathy   CAD / stent  Ischemic cardiomyopathy EF 45   Hyponatremia/hyperkalemia/acute kidney injury      Palliative Care Encounter / Counseling Regarding Goals of Care  Please see detailed goals of care discussion as below  At this time, Virgilio Sanchez, Does Not have capacity for medical decision-making.  Capacity is time limited and situation/question specific  During encounter Janet was surrogate medical decision-maker  Outcome of goals of care meeting:   Continue current medical management  Continue DNR  CCA    Code status DNR-CCA  Advanced Directives: no POA or living will in [facility]  Surrogate/Legal NOK:  Janet Sanchez (spouse) [phone]    Spiritual assessment: no spiritual distress identified  Bereavement and grief: to be determined  Referrals to: none today  SUBJECTIVE:     Current medical issues leading to Palliative Medicine involvement include   Active Hospital Problems    Diagnosis Date Noted    Palliative care encounter [Z51.5] 04/14/2024    Moderate protein-calorie malnutrition (HCC) [E44.0] 04/14/2024    Hypoxia [R09.02] 04/14/2024    Pneumonia due to infectious organism [J18.9] 04/11/2024    Acute on chronic respiratory failure (HCC) [J96.20] 04/11/2024       Details of Conversation:      Chart reviewed.  Patient seen at the bedside, he is on vent support, awake, following commands, wife was present in the room.  She informed, goal is for her  to have a successful liberation of vent support, and he is able to hold his on once extubated.  She is not sure if he would want to be reintubated, or what decision she would make if he does not do well postextubation.  However she is optimistic, he will be able to make that decision himself.  Continue DNR CCA.  Comfort support provided.  Will continue to follow.    OBJECTIVE:   Prognosis: Guarded    Physical Exam:  /67   Pulse (!) 105   Temp 97.2 °F (36.2 °C)   Resp 23   Ht 1.829 m (6')   Wt 74 kg (163 lb 2.3 oz)   SpO2 93%   BMI 22.13 kg/m²   Constitutional:  Elderly, intubated, sedated  Eyes: no scleral icterus, normal lids, no discharge  ENMT:  Normocephalic, atraumatic, mucosa moist, EOMI  Neck:  trachea midline, no JVD  Lungs: Rhonchi  Heart::  RRR  Abd:  Soft, non tender, rounded, bowel sounds present  Ext:  Moving all extremities, +edema, pulses present  Skin:  Warm and dry, impaired skin integrity  Psych: non-anxious affect  Neuro: Intubated, sedated, unable to follow commands    Objective data reviewed: labs, images, records, medication

## 2024-04-17 NOTE — PROGRESS NOTES
Wean parameter done    VT= 245 mls  F= 32 B/M  V= 7.61 l/m  NIF=-25 cmH2O  VC= 495 L  RSBI= 138    Patient has a positive air leak.

## 2024-04-17 NOTE — PLAN OF CARE
Problem: Safety - Adult  Goal: Free from fall injury  Outcome: Not Progressing     Problem: Discharge Planning  Goal: Discharge to home or other facility with appropriate resources  Outcome: Not Progressing     Problem: Safety - Medical Restraint  Goal: Remains free of injury from restraints (Restraint for Interference with Medical Device)  Description: INTERVENTIONS:  1. Determine that other, less restrictive measures have been tried or would not be effective before applying the restraint  2. Evaluate the patient's condition at the time of restraint application  3. Inform patient/family regarding the reason for restraint  4. Q2H: Monitor safety, psychosocial status, comfort, nutrition and hydration  Outcome: Not Progressing  Flowsheets  Taken 4/16/2024 2000 by Terese Snell RN  Remains free of injury from restraints (restraint for interference with medical device):   Determine that other, less restrictive measures have been tried or would not be effective before applying the restraint   Evaluate the patient's condition at the time of restraint application   Inform patient/family regarding the reason for restraint   Every 2 hours: Monitor safety, psychosocial status, comfort, nutrition and hydration  Taken 4/16/2024 1800 by Dmitri Garcia RN  Remains free of injury from restraints (restraint for interference with medical device):   Determine that other, less restrictive measures have been tried or would not be effective before applying the restraint   Evaluate the patient's condition at the time of restraint application   Inform patient/family regarding the reason for restraint   Every 2 hours: Monitor safety, psychosocial status, comfort, nutrition and hydration

## 2024-04-17 NOTE — PROGRESS NOTES
Responding well to my questions   RRR   Lungs markedly diminished BS  Abdomen soft without tenderness  Discussed with wife again today   Continue all aggressive measures

## 2024-04-18 ENCOUNTER — APPOINTMENT (OUTPATIENT)
Dept: GENERAL RADIOLOGY | Age: 84
End: 2024-04-18
Payer: MEDICARE

## 2024-04-18 LAB
AADO2: 123.8 MMHG
ALBUMIN SERPL-MCNC: 3.9 G/DL (ref 3.5–5.2)
ALP SERPL-CCNC: 83 U/L (ref 40–129)
ALT SERPL-CCNC: 16 U/L (ref 0–40)
ANION GAP SERPL CALCULATED.3IONS-SCNC: 14 MMOL/L (ref 7–16)
AST SERPL-CCNC: 17 U/L (ref 0–39)
B.E.: 2.1 MMOL/L (ref -3–3)
BASOPHILS # BLD: 0 K/UL (ref 0–0.2)
BASOPHILS NFR BLD: 0 % (ref 0–2)
BILIRUB SERPL-MCNC: 0.6 MG/DL (ref 0–1.2)
BUN SERPL-MCNC: 31 MG/DL (ref 6–23)
CALCIUM SERPL-MCNC: 8.7 MG/DL (ref 8.6–10.2)
CHLORIDE SERPL-SCNC: 106 MMOL/L (ref 98–107)
CO2 SERPL-SCNC: 25 MMOL/L (ref 22–29)
COHB: 1 % (ref 0–1.5)
CREAT SERPL-MCNC: 1.3 MG/DL (ref 0.7–1.2)
CRITICAL: ABNORMAL
DATE ANALYZED: ABNORMAL
DATE OF COLLECTION: ABNORMAL
EOSINOPHIL # BLD: 0.24 K/UL (ref 0.05–0.5)
EOSINOPHILS RELATIVE PERCENT: 2 % (ref 0–6)
ERYTHROCYTE [DISTWIDTH] IN BLOOD BY AUTOMATED COUNT: 21.8 % (ref 11.5–15)
FIO2: 35 %
GFR SERPL CREATININE-BSD FRML MDRD: 57 ML/MIN/1.73M2
GLUCOSE SERPL-MCNC: 96 MG/DL (ref 74–99)
HCO3: 25.6 MMOL/L (ref 22–26)
HCT VFR BLD AUTO: 26.9 % (ref 37–54)
HGB BLD-MCNC: 7.8 G/DL (ref 12.5–16.5)
HHB: 3.6 % (ref 0–5)
INR PPP: 1.5
LAB: ABNORMAL
LYMPHOCYTES NFR BLD: 1.69 K/UL (ref 1.5–4)
LYMPHOCYTES RELATIVE PERCENT: 12 % (ref 20–42)
Lab: 438
MAGNESIUM SERPL-MCNC: 2.1 MG/DL (ref 1.6–2.6)
MCH RBC QN AUTO: 22 PG (ref 26–35)
MCHC RBC AUTO-ENTMCNC: 29 G/DL (ref 32–34.5)
MCV RBC AUTO: 75.8 FL (ref 80–99.9)
METHB: 0.3 % (ref 0–1.5)
MICROORGANISM SPEC CULT: ABNORMAL
MICROORGANISM SPEC CULT: ABNORMAL
MICROORGANISM/AGENT SPEC: ABNORMAL
MODE: AC
MONOCYTES NFR BLD: 0.24 K/UL (ref 0.1–0.95)
MONOCYTES NFR BLD: 2 % (ref 2–12)
NEUTROPHILS NFR BLD: 84 % (ref 43–80)
NEUTS SEG NFR BLD: 11.72 K/UL (ref 1.8–7.3)
O2 CONTENT: 11.4 ML/DL
O2 SATURATION: 96.4 % (ref 92–98.5)
O2HB: 95.1 % (ref 94–97)
OPERATOR ID: 914
PARTIAL THROMBOPLASTIN TIME: 32.7 SEC (ref 24.5–35.1)
PATIENT TEMP: 37 C
PCO2: 34.8 MMHG (ref 35–45)
PEEP/CPAP: 8 CMH2O
PFO2: 2.44 MMHG/%
PH BLOOD GAS: 7.48 (ref 7.35–7.45)
PHOSPHATE SERPL-MCNC: 2.4 MG/DL (ref 2.5–4.5)
PLATELET, FLUORESCENCE: 210 K/UL (ref 130–450)
PMV BLD AUTO: 10.7 FL (ref 7–12)
PO2: 85.3 MMHG (ref 75–100)
POTASSIUM SERPL-SCNC: 3.4 MMOL/L (ref 3.5–5)
PROT SERPL-MCNC: 6.7 G/DL (ref 6.4–8.3)
PROTHROMBIN TIME: 16.7 SEC (ref 9.3–12.4)
RBC # BLD AUTO: 3.55 M/UL (ref 3.8–5.8)
RBC # BLD: ABNORMAL 10*6/UL
RI(T): 1.45
RR MECHANICAL: 20 B/MIN
SODIUM SERPL-SCNC: 145 MMOL/L (ref 132–146)
SOURCE, BLOOD GAS: ABNORMAL
SPECIMEN DESCRIPTION: ABNORMAL
THB: 8.4 G/DL (ref 11.5–16.5)
TIME ANALYZED: 453
VT MECHANICAL: 450 ML
WBC OTHER # BLD: 13.9 K/UL (ref 4.5–11.5)

## 2024-04-18 PROCEDURE — 6370000000 HC RX 637 (ALT 250 FOR IP): Performed by: INTERNAL MEDICINE

## 2024-04-18 PROCEDURE — 85025 COMPLETE CBC W/AUTO DIFF WBC: CPT

## 2024-04-18 PROCEDURE — 2580000003 HC RX 258: Performed by: INTERNAL MEDICINE

## 2024-04-18 PROCEDURE — 6360000002 HC RX W HCPCS

## 2024-04-18 PROCEDURE — 6370000000 HC RX 637 (ALT 250 FOR IP)

## 2024-04-18 PROCEDURE — 2500000003 HC RX 250 WO HCPCS: Performed by: INTERNAL MEDICINE

## 2024-04-18 PROCEDURE — 6360000002 HC RX W HCPCS: Performed by: INTERNAL MEDICINE

## 2024-04-18 PROCEDURE — 94003 VENT MGMT INPAT SUBQ DAY: CPT

## 2024-04-18 PROCEDURE — 94640 AIRWAY INHALATION TREATMENT: CPT

## 2024-04-18 PROCEDURE — 2580000003 HC RX 258: Performed by: NURSE PRACTITIONER

## 2024-04-18 PROCEDURE — 85610 PROTHROMBIN TIME: CPT

## 2024-04-18 PROCEDURE — P9047 ALBUMIN (HUMAN), 25%, 50ML: HCPCS

## 2024-04-18 PROCEDURE — C9113 INJ PANTOPRAZOLE SODIUM, VIA: HCPCS

## 2024-04-18 PROCEDURE — 2580000003 HC RX 258

## 2024-04-18 PROCEDURE — 2500000003 HC RX 250 WO HCPCS: Performed by: NURSE PRACTITIONER

## 2024-04-18 PROCEDURE — 84100 ASSAY OF PHOSPHORUS: CPT

## 2024-04-18 PROCEDURE — 99291 CRITICAL CARE FIRST HOUR: CPT | Performed by: INTERNAL MEDICINE

## 2024-04-18 PROCEDURE — 85730 THROMBOPLASTIN TIME PARTIAL: CPT

## 2024-04-18 PROCEDURE — 71045 X-RAY EXAM CHEST 1 VIEW: CPT

## 2024-04-18 PROCEDURE — 83735 ASSAY OF MAGNESIUM: CPT

## 2024-04-18 PROCEDURE — 6370000000 HC RX 637 (ALT 250 FOR IP): Performed by: NURSE PRACTITIONER

## 2024-04-18 PROCEDURE — 2500000003 HC RX 250 WO HCPCS

## 2024-04-18 PROCEDURE — 82805 BLOOD GASES W/O2 SATURATION: CPT

## 2024-04-18 PROCEDURE — 80053 COMPREHEN METABOLIC PANEL: CPT

## 2024-04-18 PROCEDURE — 2000000000 HC ICU R&B

## 2024-04-18 RX ORDER — FERROUS SULFATE 325(65) MG
325 TABLET ORAL 2 TIMES DAILY WITH MEALS
Status: DISCONTINUED | OUTPATIENT
Start: 2024-04-19 | End: 2024-04-20

## 2024-04-18 RX ORDER — QUETIAPINE FUMARATE 25 MG/1
75 TABLET, FILM COATED ORAL 2 TIMES DAILY
Status: DISCONTINUED | OUTPATIENT
Start: 2024-04-18 | End: 2024-04-20

## 2024-04-18 RX ORDER — GABAPENTIN 100 MG/1
100 CAPSULE ORAL 3 TIMES DAILY
Status: DISCONTINUED | OUTPATIENT
Start: 2024-04-18 | End: 2024-04-20

## 2024-04-18 RX ADMIN — ISOSORBIDE DINITRATE 20 MG: 10 TABLET ORAL at 17:37

## 2024-04-18 RX ADMIN — SODIUM CHLORIDE, PRESERVATIVE FREE 10 ML: 5 INJECTION INTRAVENOUS at 19:50

## 2024-04-18 RX ADMIN — GABAPENTIN 100 MG: 100 CAPSULE ORAL at 10:25

## 2024-04-18 RX ADMIN — IPRATROPIUM BROMIDE 0.5 MG: 0.5 SOLUTION RESPIRATORY (INHALATION) at 08:41

## 2024-04-18 RX ADMIN — GABAPENTIN 100 MG: 100 CAPSULE ORAL at 19:50

## 2024-04-18 RX ADMIN — ATORVASTATIN CALCIUM 40 MG: 40 TABLET, FILM COATED ORAL at 08:23

## 2024-04-18 RX ADMIN — CEFEPIME 2000 MG: 2 INJECTION, POWDER, FOR SOLUTION INTRAVENOUS at 17:38

## 2024-04-18 RX ADMIN — GUAIFENESIN 200 MG: 100 LIQUID ORAL at 08:23

## 2024-04-18 RX ADMIN — BUDESONIDE 250 MCG: 0.25 INHALANT RESPIRATORY (INHALATION) at 20:27

## 2024-04-18 RX ADMIN — BUDESONIDE 250 MCG: 0.25 INHALANT RESPIRATORY (INHALATION) at 08:41

## 2024-04-18 RX ADMIN — IPRATROPIUM BROMIDE 0.5 MG: 0.5 SOLUTION RESPIRATORY (INHALATION) at 20:27

## 2024-04-18 RX ADMIN — VALPROATE SODIUM 250 MG: 100 INJECTION, SOLUTION INTRAVENOUS at 11:39

## 2024-04-18 RX ADMIN — PIPERACILLIN AND TAZOBACTAM 4500 MG: 4; .5 INJECTION, POWDER, FOR SOLUTION INTRAVENOUS at 08:19

## 2024-04-18 RX ADMIN — MIDAZOLAM 2 MG: 1 INJECTION INTRAMUSCULAR; INTRAVENOUS at 10:25

## 2024-04-18 RX ADMIN — ASPIRIN 81 MG: 81 TABLET, CHEWABLE ORAL at 08:23

## 2024-04-18 RX ADMIN — SODIUM CHLORIDE 125 MG: 9 INJECTION, SOLUTION INTRAVENOUS at 10:23

## 2024-04-18 RX ADMIN — ENOXAPARIN SODIUM 40 MG: 100 INJECTION SUBCUTANEOUS at 08:23

## 2024-04-18 RX ADMIN — QUETIAPINE FUMARATE 50 MG: 25 TABLET ORAL at 08:23

## 2024-04-18 RX ADMIN — AMLODIPINE BESYLATE 5 MG: 5 TABLET ORAL at 08:23

## 2024-04-18 RX ADMIN — IPRATROPIUM BROMIDE 0.5 MG: 0.5 SOLUTION RESPIRATORY (INHALATION) at 16:39

## 2024-04-18 RX ADMIN — QUETIAPINE FUMARATE 75 MG: 25 TABLET ORAL at 19:50

## 2024-04-18 RX ADMIN — CHLORHEXIDINE GLUCONATE 15 ML: 1.2 RINSE ORAL at 19:50

## 2024-04-18 RX ADMIN — ALBUMIN (HUMAN) 25 G: 0.25 INJECTION, SOLUTION INTRAVENOUS at 01:55

## 2024-04-18 RX ADMIN — ANTI-FUNGAL POWDER MICONAZOLE NITRATE TALC FREE: 1.42 POWDER TOPICAL at 08:25

## 2024-04-18 RX ADMIN — GUAIFENESIN 200 MG: 100 LIQUID ORAL at 01:55

## 2024-04-18 RX ADMIN — IPRATROPIUM BROMIDE 0.5 MG: 0.5 SOLUTION RESPIRATORY (INHALATION) at 12:10

## 2024-04-18 RX ADMIN — CEFEPIME 2000 MG: 2 INJECTION, POWDER, FOR SOLUTION INTRAVENOUS at 11:23

## 2024-04-18 RX ADMIN — SODIUM CHLORIDE, PRESERVATIVE FREE 40 MG: 5 INJECTION INTRAVENOUS at 08:23

## 2024-04-18 RX ADMIN — DEXMEDETOMIDINE 0.4 MCG/KG/HR: 100 INJECTION, SOLUTION INTRAVENOUS at 04:33

## 2024-04-18 RX ADMIN — POTASSIUM PHOSPHATE, MONOBASIC AND POTASSIUM PHOSPHATE, DIBASIC 15 MMOL: 224; 236 INJECTION, SOLUTION, CONCENTRATE INTRAVENOUS at 08:22

## 2024-04-18 RX ADMIN — GUAIFENESIN 200 MG: 100 LIQUID ORAL at 19:50

## 2024-04-18 RX ADMIN — ARFORMOTEROL TARTRATE 15 MCG: 15 SOLUTION RESPIRATORY (INHALATION) at 20:27

## 2024-04-18 RX ADMIN — ISOSORBIDE DINITRATE 20 MG: 10 TABLET ORAL at 08:22

## 2024-04-18 RX ADMIN — CHLORHEXIDINE GLUCONATE 15 ML: 1.2 RINSE ORAL at 08:23

## 2024-04-18 RX ADMIN — SODIUM CHLORIDE, PRESERVATIVE FREE 10 ML: 5 INJECTION INTRAVENOUS at 08:25

## 2024-04-18 RX ADMIN — VALPROATE SODIUM 250 MG: 100 INJECTION, SOLUTION INTRAVENOUS at 22:53

## 2024-04-18 RX ADMIN — ARFORMOTEROL TARTRATE 15 MCG: 15 SOLUTION RESPIRATORY (INHALATION) at 08:41

## 2024-04-18 RX ADMIN — ANTI-FUNGAL POWDER MICONAZOLE NITRATE TALC FREE: 1.42 POWDER TOPICAL at 19:53

## 2024-04-18 ASSESSMENT — PULMONARY FUNCTION TESTS
PIF_VALUE: 38
PIF_VALUE: 40
PIF_VALUE: 30
PIF_VALUE: 31
PIF_VALUE: 33
PIF_VALUE: 44
PIF_VALUE: 32
PIF_VALUE: 30
PIF_VALUE: 28
PIF_VALUE: 29
PIF_VALUE: 37
PIF_VALUE: 33
PIF_VALUE: 34
PIF_VALUE: 39
PIF_VALUE: 34
PIF_VALUE: 27
PIF_VALUE: 28
PIF_VALUE: 34
PIF_VALUE: 29
PIF_VALUE: 33
PIF_VALUE: 27
PIF_VALUE: 32
PIF_VALUE: 28
PIF_VALUE: 30
PIF_VALUE: 46
PIF_VALUE: 21
PIF_VALUE: 29
PIF_VALUE: 29
PIF_VALUE: 38
PIF_VALUE: 33

## 2024-04-18 ASSESSMENT — PAIN SCALES - GENERAL
PAINLEVEL_OUTOF10: 0

## 2024-04-18 NOTE — PROGRESS NOTES
Remains sedated on vent  Afebrile with relative hypotension  RRR  Lungs more clear  No significant peripheral edema  Wean vent as possible   Continue all aggressive measures

## 2024-04-18 NOTE — PROGRESS NOTES
[facility]             Pulmonary & Critical Care Medicine                MICU Progress Note                 Written by: Raul Prakash MD  Name: Virgilio Sanchez : 1940       Age: 83 y.o. MR/Act #    : [id number],  Billing  #    : [id number]   Admit Date: 2024  2:16 PM LOS: 7,   Hospital Day: 8 Room #      : 4405/4405-A   PCP            : Jef Altamirano MD,   Referred by: Jef Altamirano MD ICU Attending: MD Pedro Boss date: 2024 10:38 AM   ICU Days:       4 Vent Days:     4 LOS: 7,                          Reason for ICU admission           Chief Complaint   Patient presents with    Shortness of Breath     Wife called EMS for SOB. EMS found patient 63% on room air. Patient 90% on 6L.     Leg Swelling     Bilateral lower leg edema                          Brief HPI, Presentation & Synopsis                       Virgilio Sanchez 83 y.o. male, with past medical history of CAD/stent, valvular heart disease, hypertension, COPD, chronic hypoxic respiratory failure, duodenal ulcer/massive GI bleeding 2018 requiring GDA embolization, prior alcohol abuse.  Patient presented with shortness of breath and altered mental status on .  Was seen by pulmonology, placed initially on noninvasive ventilation/AVAPS.  However due to lack of improvement clinically and on blood gas, patient was transferred to ICU for consideration of intubation and mechanical ventilation.  Chest x-ray showed left upper lobe airspace disease.  He was on antibiotics.  Blood cultures showing GPC in clusters.    Active problem list of yesterday:  Active Hospital Problems    Diagnosis Date Noted    Palliative care encounter [Z51.5] 2024    Moderate protein-calorie malnutrition (HCC) [E44.0] 2024    Hypoxia [R09.02] 2024    Pneumonia due to infectious organism [J18.9] 2024    Acute on chronic respiratory failure (HCC) [J96.20] 2024

## 2024-04-18 NOTE — PROGRESS NOTES
Occupational Therapy    Date:2024  Patient Name: Virgilio Sanchez  MRN: [id number]  : 1940  Room: [address] [place], [place] [address]     OT orders received and chart reviewed. OT eval on hold at this time 2/2 medical/cognitive status.  OT will follow and re-attempt eval as appropriate at a later time/date.    Syeda Davis OTR/L; [id number]

## 2024-04-18 NOTE — PROGRESS NOTES
Department of Internal Medicine  Infectious Diseases  Progress  Note      C/C : Strep  Bacteremia , Pneumonia       Pt is intubated, awake and alert  No fever  Reports SOB     Current Facility-Administered Medications   Medication Dose Route Frequency Provider Last Rate Last Admin    potassium phosphate 15 mmol in sodium chloride 0.9 % 250 mL IVPB  15 mmol IntraVENous Once NorrisEmily APRN - CNP 62.5 mL/hr at 04/18/24 0822 15 mmol at 04/18/24 0822    gabapentin (NEURONTIN) capsule 100 mg  100 mg Oral TID Raul Prakash MD   100 mg at 04/18/24 1025    QUEtiapine (SEROQUEL) tablet 75 mg  75 mg Oral BID Raul Prakash MD        [START ON 4/19/2024] pantoprazole (PROTONIX) 40 mg in sodium chloride (PF) 0.9 % 10 mL injection  40 mg IntraVENous Daily Raul Prakash MD        ferric gluconate (FERRLECIT) 125 mg in sodium chloride 0.9 % 100 mL IVPB  125 mg IntraVENous Once Raul Prakash  mL/hr at 04/18/24 1023 125 mg at 04/18/24 1023    [START ON 4/19/2024] ferrous sulfate (IRON 325) tablet 325 mg  325 mg Oral BID WC Raul Prakash MD        ceFEPIme (MAXIPIME) 2,000 mg in sodium chloride 0.9 % 100 mL IVPB ([id number])  2,000 mg IntraVENous Q8H Magnus Gonzalez MD        isosorbide dinitrate (ISORDIL) tablet 20 mg  20 mg Oral TID Raul Prakash MD   20 mg at 04/18/24 0822    polyethylene glycol (GLYCOLAX) packet 17 g  17 g Oral Daily Reed Hernandez MD   17 g at 04/17/24 0856    dexmedeTOMIDine (PRECEDEX) 1,000 mcg in sodium chloride 0.9 % 250 mL infusion  0.1-1.5 mcg/kg/hr IntraVENous Continuous Raul Prakash MD 7.4 mL/hr at 04/18/24 0625 0.4 mcg/kg/hr at 04/18/24 0625    fentaNYL (SUBLIMAZE) injection 25 mcg  25 mcg IntraVENous Once Raul Prakash MD        sodium chloride flush 0.9 % injection 5-40 mL  5-40 mL IntraVENous 2 times per day Reed Hernandez MD   10 mL at 04/18/24 0825    sodium chloride flush 0.9 % injection 5-40 mL  5-40 mL IntraVENous PRN Reed Hernandez MD        0.9 % sodium chloride

## 2024-04-18 NOTE — PROGRESS NOTES
Physical Therapy    PT consult to evaluate/treat received and appreciated.  Pt chart reviewed and evaluation attempted.  Pt is currently medical/cognitive status.  Will check back as able.  Thank you.        August Logan, PT, DPT   [id number]

## 2024-04-18 NOTE — PLAN OF CARE
Problem: Safety - Medical Restraint  Goal: Remains free of injury from restraints (Restraint for Interference with Medical Device)  Description: INTERVENTIONS:  1. Determine that other, less restrictive measures have been tried or would not be effective before applying the restraint  2. Evaluate the patient's condition at the time of restraint application  3. Inform patient/family regarding the reason for restraint  4. Q2H: Monitor safety, psychosocial status, comfort, nutrition and hydration  Outcome: Progressing  Flowsheets (Taken 4/18/2024 5087)  Remains free of injury from restraints (restraint for interference with medical device): Determine that other, less restrictive measures have been tried or would not be effective before applying the restraint     Problem: Pain  Goal: Verbalizes/displays adequate comfort level or baseline comfort level  Outcome: Progressing

## 2024-04-18 NOTE — CARE COORDINATION
Care Coordination: LOS 7 day. Intubated, Precedex, cefepime, bumex.  ID and nephrology following.  [facility] is following. Referral made to [facility] per previous Sw, will follow for transition of care needs.    Electronically signed by Mavis Shelby RN on 4/18/2024 at 2:29 PM

## 2024-04-18 NOTE — PROGRESS NOTES
Attempted PSV 10 rr increased to high 30's and patient breathing hard and biting tube.  Placed back on AC   89 YR OLD MALE WITH CAD S/P CABG, ISCHEMIC CARDIOMYOPATHY WITH LVEF OF 15%. SENT TO ED BY EMS FOR INCREASING LETHARGY, POOR ORAL INTAKE AND SOB. ON FOUND TO HAVE PNA AND COAG NEG STAPH BACTEREMIA 1/4 BOTTLES ( LIKELY A CONTAMINANT).  NOTED TO HAVE ELEVATED SERUM CREATININE OF 2.0 AND  RISING  PROMPTING A RENAL CONSULT. OVERNIGHT INCREASED D5W TO 100CC/HR BUT HAS SOB THIS MORNING. HE IS LETHARGIC AND A POOR HISTORIAN.    PAST MEDICAL & SURGICAL HISTORY:  CHF (congestive heart failure)  BPH (benign prostatic hyperplasia)  CAD (coronary artery disease)  Bradycardia  S/P CABG (coronary artery bypass graft)    No Known Allergies    Home Medications Reviewed  Hospital Medications:   MEDICATIONS  (STANDING):  acetylcysteine 10% Inhalation 4 milliLiter(s) Inhalation every 6 hours  ALBUTerol/ipratropium for Nebulization 3 milliLiter(s) Nebulizer every 6 hours  aspirin Suppository 300 milliGRAM(s) Rectal daily  dextrose 5%. 1000 milliLiter(s) (50 mL/Hr) IV Continuous <Continuous>  heparin  Injectable 5000 Unit(s) SubCutaneous every 8 hours  influenza   Vaccine 0.5 milliLiter(s) IntraMuscular once  insulin regular  human corrective regimen sliding scale   SubCutaneous every 6 hours  meropenem IVPB 500 milliGRAM(s) IV Intermittent every 12 hours    SOCIAL HISTORY:  Denies ETOh,Smoking,   FAMILY HISTORY:  No pertinent family history in first degree relatives      VITALS:  T(F): 97.4 (17 @ 04:55), Max: 98.2 (17 @ 21:04)  HR: 80 (17 @ 04:55)  BP: 129/84 (17 @ 04:55)  RR: 18 (17 @ 04:55)  SpO2: 95% (17 @ 04:55)  Wt(kg): --      PHYSICAL EXAM:  Constitutional: NAD  HEENT: anicteric sclera, oropharynx clear.  Neck: No JVD  Respiratory: CTAB, no wheezes, rales or rhonchi  Cardiovascular: S1, S2, RRR  Gastrointestinal: BS+, soft, NT/ND  Extremities: .No peripheral edema  Neurological: A/O x 3, no focal deficits  .       LABS:      145  |  111<H>  |  26<H>  ----------------------------<  142<H>  3.9   |  28  |  1.49<H>    Ca    9.4      2017 07:57  Phos  2.6     11-  Mg     2.9         TPro  6.6  /  Alb  2.7<L>  /  TBili  0.5  /  DBili      /  AST  18  /  ALT  20  /  AlkPhos  78  11    Creatinine Trend: 1.49 <--, 1.65 <--, 1.81 <--, 1.69 <--, 1.54 <--, 1.85 <--, 1.78 <--, 2.00 <--                        12.1   8.3   )-----------( 138      ( 2017 07:57 )             39.5     Urine Studies:  Urinalysis Basic - ( 29 Oct 2017 01:58 )    Color: Yellow / Appearance: Clear / S.025 / pH:   Gluc:  / Ketone: Trace  / Bili: Negative / Urobili: Negative   Blood:  / Protein: 500 mg/dL / Nitrite: Negative   Leuk Esterase: Negative / RBC: 2-5 /HPF / WBC 0-2 /HPF   Sq Epi:  / Non Sq Epi: Few /HPF / Bacteria:         RADIOLOGY & ADDITIONAL STUDIES:

## 2024-04-18 NOTE — PROGRESS NOTES
estimated EF of 45 - 50%. Left ventricle size is normal. Mild concentric hypertrophy. Normal wall motion. Grade I diastolic dysfunction.    Right Ventricle: Normal systolic function., TAPSE 2.1cm.    Aortic Valve: Trace to mild regurgitation. Paradoxical low gradient moderate aortic stenosis - peak gradient 37mmHg, mean 19mmHg, SHANNON 1cm2 by VTI, 1.1cm2 by Vmax; DLI 0.34.    Tricuspid Valve: Trace regurgitation., RVSP estimated at 44mmHg.    Prior study done in 1/2023.    Labs:  Lab Results   Component Value Date/Time    WBC 13.9 04/18/2024 04:25 AM    RBC 3.55 04/18/2024 04:25 AM    HGB 7.8 04/18/2024 04:25 AM    HCT 26.9 04/18/2024 04:25 AM    MCV 75.8 04/18/2024 04:25 AM    MCH 22.0 04/18/2024 04:25 AM    MCHC 29.0 04/18/2024 04:25 AM    RDW 21.8 04/18/2024 04:25 AM     04/17/2024 05:01 AM    MPV 10.7 04/18/2024 04:25 AM     Lab Results   Component Value Date/Time     04/18/2024 04:25 AM    K 3.4 04/18/2024 04:25 AM    K 3.9 03/09/2018 05:10 AM     04/18/2024 04:25 AM    CO2 25 04/18/2024 04:25 AM    BUN 31 04/18/2024 04:25 AM    CREATININE 1.3 04/18/2024 04:25 AM    CALCIUM 8.7 04/18/2024 04:25 AM    GFRAA >60 03/14/2018 06:17 AM    LABGLOM 57 04/18/2024 04:25 AM     Lab Results   Component Value Date/Time    PROTIME 16.7 04/18/2024 04:25 AM    INR 1.5 04/18/2024 04:25 AM       Micro:  Component    Specimen Description .RESPIRATORY P   Direct Exam MANY Polymorphonuclear leukocytes P    RARE EPITHELIAL CELLS P    RARE YEAST Abnormal  P    FEW GRAM NEGATIVE RODS Abnormal  P   Culture Oxidase Positive Gram Negative Gary HEAVY GROWTH Identification and sensitivity to follow. Abnormal  P    NO NORMAL CLARITA Abnormal  P   Resulting Agency [facility]              Specimen Collected: 04/16/24 06:30 EDT Last Resulted: 04/17/24 09:39 EDT             ABGs 4/18/24:   Latest Reference Range & Units 04/18/24 04:38   Source:  Blood Arterial   pH, Blood Gas 7.350 - 7.450  7.484 (H)   PCO2

## 2024-04-18 NOTE — PLAN OF CARE
Problem: Safety - Adult  Goal: Free from fall injury  4/17/2024 2017 by Vanda Barcenas RN  Outcome: Progressing  4/17/2024 0634 by Terese Snell RN  Outcome: Not Progressing     Problem: Discharge Planning  Goal: Discharge to home or other facility with appropriate resources  4/17/2024 2017 by Vanda Barcenas RN  Outcome: Progressing  4/17/2024 0634 by Terese Snell RN  Outcome: Not Progressing     Problem: Skin/Tissue Integrity  Goal: Absence of new skin breakdown  Description: 1.  Monitor for areas of redness and/or skin breakdown  2.  Assess vascular access sites hourly  3.  Every 4-6 hours minimum:  Change oxygen saturation probe site  4.  Every 4-6 hours:  If on nasal continuous positive airway pressure, respiratory therapy assess nares and determine need for appliance change or resting period.  Outcome: Progressing     Problem: Confusion  Goal: Confusion, delirium, dementia, or psychosis is improved or at baseline  Description: INTERVENTIONS:  1. Assess for possible contributors to thought disturbance, including medications, impaired vision or hearing, underlying metabolic abnormalities, dehydration, psychiatric diagnoses, and notify attending LIP  2. [place] high risk fall precautions, as indicated  3. Provide frequent short contacts to provide reality reorientation, refocusing and direction  4. Decrease environmental stimuli, including noise as appropriate  5. Monitor and intervene to maintain adequate nutrition, hydration, elimination, sleep and activity  6. If unable to ensure safety without constant attention obtain sitter and review sitter guidelines with assigned personnel  7. Initiate Psychosocial CNS and Spiritual Care consult, as indicated  4/17/2024 2017 by Vanda Barcenas RN  Outcome: Progressing  4/17/2024 0634 by Terese Snell RN  Outcome: Progressing     Problem: Safety - Medical Restraint  Goal: Remains free of injury from restraints (Restraint for Interference with Medical  M/S RN NOTES



PATIENT RECEIVED ALERT AND ORIENTED X4, NO RESPIRATORY DISTRESS, COMPLAINING OF PAIN ON THE 
RT UPPER ARM 9/10, PINK, RED AND WARM, DRESSING INTACT. SEVERE WEAKNESS ON THE RT ARM. 
PATIENT'S SKIN ASSESSED PHOTOS TAKEN AND PUT IN CHART. PATIENT'S BELONGINGS ACCOUNTED FOR 
AND BELONGINGS LIST SIGNED, VALUABLES AT BEDSIDE. NOTIFIED MD FOR ADMISSION ORDERS. 
PATIENT'S NEEDS ATTENDED, BED ON LOWEST LOCKED POSITION, CALL LIGHT WITHIN REACH. WILL 
CONTINUE TO MONITOR.

## 2024-04-18 NOTE — PROGRESS NOTES
Nephrology Progress Note  [facility]      CC:       HPI:   The patient is an 83 year old male with a past medical history that includes COPD, duodenal ulcer, CAD with cardiac stenting, HTN, HLD, valvular heart disease, DVT, arthritis. The patient presented to the ED on 4/11 with c/o shortness of breath with cough and fever that started a few days ago. The patient's VS in the ED were /67  Pulse 98  Temp 98.6 °F (37 °C) (Axillary)  Resp 25  SpO2 97% . His initial labs in the ED were significant for K+ 6.2, CO@ 23, BUN 42, Cr 1.9, GFR 35, mg 1.7, PO4 7.7, BNP 5441, troponin 105, WBC 26, hgb 8.2. The patient's CXR noted right airspace density likely pneumonia. The patient received treatment for hyperkalemia and received ceftriaxone and doxycycline. The patient was found unresponsive upon arrival to the sixth floor from the ED and an RRT was called and the patient was found to have hypercapneic ABG results and was placed on AVAPS. Upon assessment today the patient is easily aroused and attempts to answer questions but is disoriented and difficult to understand. Remains on NIV.     Interval History:    4/14: Seen and examined.  Patient was transferred to the medical intensive care unit last evening and was intubated due to persistent hypercarbia.  Making acceptable urine output.  Sedated.  Discussed with wife and nurse at bedside.  No further events this morning.    4/15/24: pt seen and examined in icu, chart reviewed. Intubated, family at bedside    4/16: pt seen in icu, remains intubated.     4/17: pt seen in icu, intubated, 2 visitors present, pt more alert    4/18: pt seen and examined in icu, intubated      PMH:          Past Medical History:   Diagnosis Date    Arthritis     CAD (coronary artery disease)     COPD (chronic obstructive pulmonary disease) (HCC)     Duodenal ulcer 4/12/2018    Hyperlipidemia     Hypertension     Valvular heart disease        Patient Active Problem List   Diagnosis     ventilated  Heart: RRR, no rub  Abdomen: Soft, non-tender, + bowel sounds  Extremities: Absent bilateral lower extremity edema  Neurologic: Sedated unable to assess  Data:    Recent Labs     04/16/24 0450 04/17/24  0501 04/18/24  0425   WBC 11.6* 11.4 13.9*   HGB 7.7* 8.1* 7.8*   HCT 26.5* 27.9* 26.9*   MCV 75.3* 75.2* 75.8*    223  --        Recent Labs     04/16/24  0450 04/17/24  0501 04/18/24  0425    147* 145   K 4.2 3.3* 3.4*   * 110* 106   CO2 21* 24 25   CREATININE 1.5* 1.3* 1.3*   BUN 39* 35* 31*   LABGLOM 48* 54* 57*   GLUCOSE 103* 106* 96   CALCIUM 7.9* 8.2* 8.7   PHOS 3.2 3.2 2.4*   MG 2.1 1.9 2.1       No results found for: \"VITD25\"    No results found for: \"PTH\"    Recent Labs     04/18/24 0425   ALT 16   AST 17   ALKPHOS 83   BILITOT 0.6         No results for input(s): \"LABALBU\" in the last 72 hours.      Ferritin   Date Value Ref Range Status   04/15/2024 159 ng/mL Final     Comment:           FERRITIN Reference Ranges:  Adult Males   20 - 60 years:    30 - 400 ng/mL  Adult females 17 - 60 years:    13 - 150 ng/mL  Adults greater than 60 years:   no established reference range  Pediatrics:  no established reference range       Iron   Date Value Ref Range Status   04/15/2024 19 (L) 59 - 158 ug/dL Final     TIBC   Date Value Ref Range Status   04/15/2024 159 (L) 250 - 450 ug/dL Final       Vitamin B-12   Date Value Ref Range Status   04/14/2024 739 211 - 946 pg/mL Final       Folate   Date Value Ref Range Status   04/14/2024 2.1 (L) 4.8 - 24.2 ng/mL Final         Lab Results   Component Value Date/Time    COLORU Yellow 04/12/2024 09:00 PM    NITRU NEGATIVE 04/12/2024 09:00 PM    GLUCOSEU NEGATIVE 04/12/2024 09:00 PM    KETUA NEGATIVE 04/12/2024 09:00 PM    UROBILINOGEN 0.2 04/12/2024 09:00 PM    BILIRUBINUR NEGATIVE 04/12/2024 09:00 PM       Lab Results   Component Value Date/Time    LAURENCE 29 04/12/2024 09:00 PM       No components found for: \"URIC\"    No results found for:

## 2024-04-19 ENCOUNTER — APPOINTMENT (OUTPATIENT)
Dept: GENERAL RADIOLOGY | Age: 84
End: 2024-04-19
Payer: MEDICARE

## 2024-04-19 LAB
AADO2: 120.5 MMHG
AADO2: 230.7 MMHG
ANION GAP SERPL CALCULATED.3IONS-SCNC: 12 MMOL/L (ref 7–16)
B.E.: 0.5 MMOL/L (ref -3–3)
B.E.: 0.8 MMOL/L (ref -3–3)
BASOPHILS # BLD: 0.05 K/UL (ref 0–0.2)
BASOPHILS NFR BLD: 0 % (ref 0–2)
BUN SERPL-MCNC: 32 MG/DL (ref 6–23)
CALCIUM SERPL-MCNC: 8.2 MG/DL (ref 8.6–10.2)
CHLORIDE SERPL-SCNC: 110 MMOL/L (ref 98–107)
CO2 SERPL-SCNC: 24 MMOL/L (ref 22–29)
COHB: 0.8 % (ref 0–1.5)
COHB: 1 % (ref 0–1.5)
CREAT SERPL-MCNC: 1.3 MG/DL (ref 0.7–1.2)
CRITICAL: ABNORMAL
CRITICAL: ABNORMAL
DATE ANALYZED: ABNORMAL
DATE ANALYZED: ABNORMAL
DATE OF COLLECTION: ABNORMAL
DATE OF COLLECTION: ABNORMAL
EOSINOPHIL # BLD: 0.87 K/UL (ref 0.05–0.5)
EOSINOPHILS RELATIVE PERCENT: 5 % (ref 0–6)
ERYTHROCYTE [DISTWIDTH] IN BLOOD BY AUTOMATED COUNT: 22.6 % (ref 11.5–15)
FIO2: 35 %
FIO2: 50 %
GFR SERPL CREATININE-BSD FRML MDRD: 54 ML/MIN/1.73M2
GLUCOSE SERPL-MCNC: 95 MG/DL (ref 74–99)
HCO3: 24.1 MMOL/L (ref 22–26)
HCO3: 24.9 MMOL/L (ref 22–26)
HCT VFR BLD AUTO: 27.4 % (ref 37–54)
HGB BLD-MCNC: 7.8 G/DL (ref 12.5–16.5)
HHB: 3.5 % (ref 0–5)
HHB: 4 % (ref 0–5)
IMM GRANULOCYTES # BLD AUTO: 0.37 K/UL (ref 0–0.58)
IMM GRANULOCYTES NFR BLD: 2 % (ref 0–5)
LAB: ABNORMAL
LAB: ABNORMAL
LYMPHOCYTES NFR BLD: 1.72 K/UL (ref 1.5–4)
LYMPHOCYTES RELATIVE PERCENT: 11 % (ref 20–42)
Lab: 1014
Lab: 422
MAGNESIUM SERPL-MCNC: 2 MG/DL (ref 1.6–2.6)
MCH RBC QN AUTO: 21.8 PG (ref 26–35)
MCHC RBC AUTO-ENTMCNC: 28.5 G/DL (ref 32–34.5)
MCV RBC AUTO: 76.8 FL (ref 80–99.9)
METHB: 0.3 % (ref 0–1.5)
METHB: 0.3 % (ref 0–1.5)
MODE: ABNORMAL
MODE: AC
MONOCYTES NFR BLD: 0.68 K/UL (ref 0.1–0.95)
MONOCYTES NFR BLD: 4 % (ref 2–12)
NEUTROPHILS NFR BLD: 77 % (ref 43–80)
NEUTS SEG NFR BLD: 12.58 K/UL (ref 1.8–7.3)
O2 CONTENT: 12 ML/DL
O2 CONTENT: 12.1 ML/DL
O2 SATURATION: 95.9 % (ref 92–98.5)
O2 SATURATION: 96.5 % (ref 92–98.5)
O2HB: 94.7 % (ref 94–97)
O2HB: 95.4 % (ref 94–97)
OPERATOR ID: 1741
OPERATOR ID: 2577
PATIENT TEMP: 37 C
PATIENT TEMP: 37 C
PCO2: 34.5 MMHG (ref 35–45)
PCO2: 37.9 MMHG (ref 35–45)
PEEP/CPAP: 10 CMH2O
PEEP/CPAP: 8 CMH2O
PFO2: 1.74 MMHG/%
PFO2: 2.43 MMHG/%
PH BLOOD GAS: 7.44 (ref 7.35–7.45)
PH BLOOD GAS: 7.46 (ref 7.35–7.45)
PHOSPHATE SERPL-MCNC: 2.7 MG/DL (ref 2.5–4.5)
PLATELET, FLUORESCENCE: 217 K/UL (ref 130–450)
PMV BLD AUTO: 11.7 FL (ref 7–12)
PO2: 85 MMHG (ref 75–100)
PO2: 87 MMHG (ref 75–100)
POTASSIUM SERPL-SCNC: 3.3 MMOL/L (ref 3.5–5)
PS: 16 CMH20
RBC # BLD AUTO: 3.57 M/UL (ref 3.8–5.8)
RBC # BLD: ABNORMAL 10*6/UL
RI(T): 1.42
RI(T): 2.65
RR MECHANICAL: 16 B/MIN
RR MECHANICAL: 20 B/MIN
SODIUM SERPL-SCNC: 146 MMOL/L (ref 132–146)
SOURCE, BLOOD GAS: ABNORMAL
SOURCE, BLOOD GAS: ABNORMAL
THB: 8.9 G/DL (ref 11.5–16.5)
THB: 8.9 G/DL (ref 11.5–16.5)
TIME ANALYZED: 1028
TIME ANALYZED: 426
VT MECHANICAL: 350 ML
WBC OTHER # BLD: 16.3 K/UL (ref 4.5–11.5)

## 2024-04-19 PROCEDURE — 6360000002 HC RX W HCPCS: Performed by: INTERNAL MEDICINE

## 2024-04-19 PROCEDURE — 6360000002 HC RX W HCPCS

## 2024-04-19 PROCEDURE — 6370000000 HC RX 637 (ALT 250 FOR IP): Performed by: INTERNAL MEDICINE

## 2024-04-19 PROCEDURE — 94660 CPAP INITIATION&MGMT: CPT

## 2024-04-19 PROCEDURE — 99231 SBSQ HOSP IP/OBS SF/LOW 25: CPT

## 2024-04-19 PROCEDURE — 97162 PT EVAL MOD COMPLEX 30 MIN: CPT

## 2024-04-19 PROCEDURE — 85025 COMPLETE CBC W/AUTO DIFF WBC: CPT

## 2024-04-19 PROCEDURE — 94003 VENT MGMT INPAT SUBQ DAY: CPT

## 2024-04-19 PROCEDURE — 6370000000 HC RX 637 (ALT 250 FOR IP): Performed by: NURSE PRACTITIONER

## 2024-04-19 PROCEDURE — 82805 BLOOD GASES W/O2 SATURATION: CPT

## 2024-04-19 PROCEDURE — 2580000003 HC RX 258: Performed by: INTERNAL MEDICINE

## 2024-04-19 PROCEDURE — 99291 CRITICAL CARE FIRST HOUR: CPT | Performed by: INTERNAL MEDICINE

## 2024-04-19 PROCEDURE — C9113 INJ PANTOPRAZOLE SODIUM, VIA: HCPCS | Performed by: INTERNAL MEDICINE

## 2024-04-19 PROCEDURE — 83735 ASSAY OF MAGNESIUM: CPT

## 2024-04-19 PROCEDURE — 2580000003 HC RX 258

## 2024-04-19 PROCEDURE — 2700000000 HC OXYGEN THERAPY PER DAY

## 2024-04-19 PROCEDURE — 51798 US URINE CAPACITY MEASURE: CPT

## 2024-04-19 PROCEDURE — 2500000003 HC RX 250 WO HCPCS: Performed by: INTERNAL MEDICINE

## 2024-04-19 PROCEDURE — 84100 ASSAY OF PHOSPHORUS: CPT

## 2024-04-19 PROCEDURE — 80048 BASIC METABOLIC PNL TOTAL CA: CPT

## 2024-04-19 PROCEDURE — 2500000003 HC RX 250 WO HCPCS

## 2024-04-19 PROCEDURE — 97530 THERAPEUTIC ACTIVITIES: CPT

## 2024-04-19 PROCEDURE — 2500000003 HC RX 250 WO HCPCS: Performed by: NURSE PRACTITIONER

## 2024-04-19 PROCEDURE — 71045 X-RAY EXAM CHEST 1 VIEW: CPT

## 2024-04-19 PROCEDURE — 94640 AIRWAY INHALATION TREATMENT: CPT

## 2024-04-19 PROCEDURE — 2000000000 HC ICU R&B

## 2024-04-19 RX ORDER — POTASSIUM CHLORIDE 20 MEQ/1
40 TABLET, EXTENDED RELEASE ORAL 2 TIMES DAILY WITH MEALS
Status: DISCONTINUED | OUTPATIENT
Start: 2024-04-19 | End: 2024-04-19

## 2024-04-19 RX ADMIN — VALPROATE SODIUM 250 MG: 100 INJECTION, SOLUTION INTRAVENOUS at 23:02

## 2024-04-19 RX ADMIN — GUAIFENESIN 200 MG: 100 LIQUID ORAL at 02:21

## 2024-04-19 RX ADMIN — QUETIAPINE FUMARATE 75 MG: 25 TABLET ORAL at 19:46

## 2024-04-19 RX ADMIN — POTASSIUM BICARBONATE 40 MEQ: 782 TABLET, EFFERVESCENT ORAL at 08:00

## 2024-04-19 RX ADMIN — ANTI-FUNGAL POWDER MICONAZOLE NITRATE TALC FREE: 1.42 POWDER TOPICAL at 08:01

## 2024-04-19 RX ADMIN — BUDESONIDE 250 MCG: 0.25 INHALANT RESPIRATORY (INHALATION) at 18:24

## 2024-04-19 RX ADMIN — SODIUM CHLORIDE, PRESERVATIVE FREE 10 ML: 5 INJECTION INTRAVENOUS at 19:47

## 2024-04-19 RX ADMIN — GUAIFENESIN 200 MG: 100 LIQUID ORAL at 13:28

## 2024-04-19 RX ADMIN — FERROUS SULFATE TAB 325 MG (65 MG ELEMENTAL FE) 325 MG: 325 (65 FE) TAB at 08:00

## 2024-04-19 RX ADMIN — IPRATROPIUM BROMIDE 0.5 MG: 0.5 SOLUTION RESPIRATORY (INHALATION) at 18:23

## 2024-04-19 RX ADMIN — BUDESONIDE 250 MCG: 0.25 INHALANT RESPIRATORY (INHALATION) at 08:01

## 2024-04-19 RX ADMIN — SODIUM CHLORIDE, PRESERVATIVE FREE 10 ML: 5 INJECTION INTRAVENOUS at 08:01

## 2024-04-19 RX ADMIN — ENOXAPARIN SODIUM 40 MG: 100 INJECTION SUBCUTANEOUS at 08:01

## 2024-04-19 RX ADMIN — CEFEPIME 2000 MG: 2 INJECTION, POWDER, FOR SOLUTION INTRAVENOUS at 17:10

## 2024-04-19 RX ADMIN — IPRATROPIUM BROMIDE 0.5 MG: 0.5 SOLUTION RESPIRATORY (INHALATION) at 10:43

## 2024-04-19 RX ADMIN — ISOSORBIDE DINITRATE 20 MG: 10 TABLET ORAL at 08:00

## 2024-04-19 RX ADMIN — FERROUS SULFATE TAB 325 MG (65 MG ELEMENTAL FE) 325 MG: 325 (65 FE) TAB at 17:09

## 2024-04-19 RX ADMIN — DEXMEDETOMIDINE 0.4 MCG/KG/HR: 100 INJECTION, SOLUTION INTRAVENOUS at 07:36

## 2024-04-19 RX ADMIN — ANTI-FUNGAL POWDER MICONAZOLE NITRATE TALC FREE: 1.42 POWDER TOPICAL at 19:46

## 2024-04-19 RX ADMIN — POTASSIUM BICARBONATE 40 MEQ: 782 TABLET, EFFERVESCENT ORAL at 19:46

## 2024-04-19 RX ADMIN — EPOETIN ALFA-EPBX 2000 UNITS: 2000 INJECTION, SOLUTION INTRAVENOUS; SUBCUTANEOUS at 17:09

## 2024-04-19 RX ADMIN — GABAPENTIN 100 MG: 100 CAPSULE ORAL at 19:46

## 2024-04-19 RX ADMIN — ASPIRIN 81 MG: 81 TABLET, CHEWABLE ORAL at 08:00

## 2024-04-19 RX ADMIN — QUETIAPINE FUMARATE 75 MG: 25 TABLET ORAL at 08:00

## 2024-04-19 RX ADMIN — CHLORHEXIDINE GLUCONATE 15 ML: 1.2 RINSE ORAL at 08:00

## 2024-04-19 RX ADMIN — GABAPENTIN 100 MG: 100 CAPSULE ORAL at 13:28

## 2024-04-19 RX ADMIN — GABAPENTIN 100 MG: 100 CAPSULE ORAL at 08:00

## 2024-04-19 RX ADMIN — IPRATROPIUM BROMIDE 0.5 MG: 0.5 SOLUTION RESPIRATORY (INHALATION) at 08:02

## 2024-04-19 RX ADMIN — ATORVASTATIN CALCIUM 40 MG: 40 TABLET, FILM COATED ORAL at 08:00

## 2024-04-19 RX ADMIN — IPRATROPIUM BROMIDE 0.5 MG: 0.5 SOLUTION RESPIRATORY (INHALATION) at 14:56

## 2024-04-19 RX ADMIN — ARFORMOTEROL TARTRATE 15 MCG: 15 SOLUTION RESPIRATORY (INHALATION) at 18:23

## 2024-04-19 RX ADMIN — GUAIFENESIN 200 MG: 100 LIQUID ORAL at 19:46

## 2024-04-19 RX ADMIN — ARFORMOTEROL TARTRATE 15 MCG: 15 SOLUTION RESPIRATORY (INHALATION) at 08:02

## 2024-04-19 RX ADMIN — GUAIFENESIN 200 MG: 100 LIQUID ORAL at 08:00

## 2024-04-19 RX ADMIN — CEFEPIME 2000 MG: 2 INJECTION, POWDER, FOR SOLUTION INTRAVENOUS at 02:23

## 2024-04-19 RX ADMIN — CEFEPIME 2000 MG: 2 INJECTION, POWDER, FOR SOLUTION INTRAVENOUS at 09:06

## 2024-04-19 RX ADMIN — AMLODIPINE BESYLATE 5 MG: 5 TABLET ORAL at 08:00

## 2024-04-19 RX ADMIN — SODIUM CHLORIDE, PRESERVATIVE FREE 40 MG: 5 INJECTION INTRAVENOUS at 08:00

## 2024-04-19 RX ADMIN — VALPROATE SODIUM 250 MG: 100 INJECTION, SOLUTION INTRAVENOUS at 11:43

## 2024-04-19 ASSESSMENT — PULMONARY FUNCTION TESTS
PIF_VALUE: 12
PIF_VALUE: 32
PIF_VALUE: 25
PIF_VALUE: 23
PIF_VALUE: 32
PIF_VALUE: 32
PIF_VALUE: 25
PIF_VALUE: 19
PIF_VALUE: 26
PIF_VALUE: 32
PIF_VALUE: 33
PIF_VALUE: 25

## 2024-04-19 ASSESSMENT — PAIN SCALES - GENERAL
PAINLEVEL_OUTOF10: 0

## 2024-04-19 NOTE — PROGRESS NOTES
04/19/24 1100   NIV Type   NIV Started/Stopped Off  (patient off niv to 50% venti mask)   Assessment   Pulse 73   Respirations (!) 32   SpO2 100 %

## 2024-04-19 NOTE — PROGRESS NOTES
04/19/24 1430   NIV Type   NIV Started/Stopped Off  (off niv and placed on 50% venti mask.)   Assessment   Pulse 74   Respirations (!) 34   SpO2 93 %

## 2024-04-19 NOTE — PROGRESS NOTES
Chris Brown M.D.,[facility]  Ish Lagunas D.O., BRIGID., [facility]  Ansley Everett M.D.  Mary Alice Chavez M.D.   EZEQUIEL CansecoO.        Daily Pulmonary Progress Note    Patient:  Virgilio Sanchez 83 y.o. male MRN: [id number]            Synopsis     We are following patient for respiratory failure, CAP    \"CC\" SOB    Code status: DNR CCA      Subjective      Patient was seen and examined in the ICU.  He was extubated today and placed on BiPAP 16/10, rate 16, 50% FiO2.  SpO2 currently 99%.  He is alert and nodding appropriately when answering questions.  Respiratory rate is currently in the 30s.  Blood pressure running a little soft 85/50.  Sedation has been discontinued.    Review of Systems:  Unable to obtain    24-hour events:  Extubated    Objective   OBJECTIVE:   BP (!) 117/50   Pulse 91   Temp 98 °F (36.7 °C) (Temporal)   Resp (!) 34   Ht 1.829 m (6')   Wt 74 kg (163 lb 2.3 oz)   SpO2 (!) 89%   BMI 22.13 kg/m²   SpO2 Readings from Last 1 Encounters:   04/19/24 (!) 89%        I/O:    Intake/Output Summary (Last 24 hours) at 4/19/2024 1325  Last data filed at 4/19/2024 0650  Gross per 24 hour   Intake 2604.89 ml   Output 825 ml   Net 1779.89 ml       Vent Information  Ventilator Day(s): 6  Ventilator ID: [id number]  Equipment Changed: (S) Humidification  Ventilator Initiate: Yes  Ventilator Discontinue: Yes (extubated per order.)  Vent Mode: (S) CPAP/PS (trialed on PS)       IPAP: 16 cmH20  CPAP/EPAP: 10 cmH2O     CURRENT MEDS :  Scheduled Meds:   potassium bicarb-citric acid  40 mEq Oral BID    gabapentin  100 mg Oral TID    QUEtiapine  75 mg Oral BID    pantoprazole (PROTONIX) 40 mg in sodium chloride (PF) 0.9 % 10 mL injection  40 mg IntraVENous Daily    ferrous sulfate  325 mg Oral BID WC    cefepime  2,000 mg IntraVENous Q8H    [Held by provider] isosorbide dinitrate  20 mg Oral TID    polyethylene glycol  17 g Oral Daily    fentanNYL  25 mcg IntraVENous Once    sodium chloride flush  5-40 mL  IntraVENous 2 times per day    epoetin ramses-epbx  2,000 Units SubCUTAneous Once per day on Mon Wed Fri    enoxaparin  40 mg SubCUTAneous Daily    valproate (DEPACON) 250 mg in sodium chloride 0.9 % 100 mL IVPB  250 mg IntraVENous Q12H    amLODIPine  5 mg Oral Daily    aspirin  81 mg Oral Daily    guaiFENesin  200 mg Per NG tube Q6H    arformoterol tartrate  15 mcg Nebulization BID RT    miconazole   Topical BID    [Held by provider] lisinopril  10 mg Oral Daily    [Held by provider] metoprolol tartrate  50 mg Oral BID    atorvastatin  40 mg Oral Daily    budesonide  0.25 mg Nebulization BID RT    And    ipratropium  0.5 mg Nebulization 4x Daily RT       Physical Exam:  General Appearance: appears comfortable in no acute distress.   HEENT: Normocephalic atraumatic without obvious abnormality   Neck: Lips, mucosa, and tongue normal.  Supple, symmetrical, trachea midline, no adenopathy;thyroid:  no enlargement/tenderness/nodules or JVD.  Lung: Breath sounds CTA. Respirations   unlabored. Symmetrical expansion.  Heart: RRR, normal S1, S2. No MRG  Abdomen: Soft, NT, ND. BS present x 4 quadrants. No bruit or organomegaly.   Extremities: Pedal pulses 2+ symmetric b/l.  Extremities normal, no cyanosis, clubbing, or edema.   Musculokeletal: No joint swelling, no muscle tenderness. ROM normal in all joints of extremities.   Neurologic: Mental status: Alert and responsive with purposeful movement.    Pertinent/ New Labs and Imaging Studies     Imaging personally reviewed:  CXR 4/19/2024:   FINDINGS:  Endotracheal tube 1.3 cm superior to aleksandr.  Nasogastric tube courses in the  proximal stomach.  Surgical clips visualized medially right upper quadrant.  Osseous structures intact.  Cardiopericardial silhouette normal.  Aortic  calcified.  Ill-defined area of increased opacity identified left upper lobe  with air bronchogram, and left lower lobe.  Blunting left costophrenic angle.     IMPRESSION:  Left lung

## 2024-04-19 NOTE — PROGRESS NOTES
04/19/24 1458   NIV Type   Suction Setup and Functional Yes   NIV Started/Stopped (S)  On  (placed back on NIV for increased work of breathing and desaturation to 86%, patient is a mouth breather. tried 15L HFNC as well with no saturation increase)   Equipment Type v60   Mode Bilevel   Mask Type Full face mask   Mask Size Large   Assessment   Pulse 79   Heart Rate Source Monitor   Respirations (!) 43   SpO2 (!) 86 %   Level of Consciousness 0   Comfort Level Good   Using Accessory Muscles Yes   Mask Compliance Fair   Skin Assessment Redness (see comment/note)   Skin Protection for O2 Device Yes   Orientation Middle   Location Nose   Intervention(s) Skin Barrier   Breath Sounds   Breath Sounds Bilateral Crackles    Right Upper Lobe Crackles;Rhonchi   Right Middle Lobe Crackles   Right Lower Lobe Crackles;Rhonchi   Left Upper Lobe Crackles   Left Lower Lobe Crackles   Settings/Measurements   PIP Observed 17 cm H20   IPAP 16 cmH20   CPAP/EPAP 10 cmH2O   Vt (Measured) 461 mL   Rate Ordered 16   Insp Rise Time (%) 3 %   FiO2  50 %   I Time/ I Time % 0.65 s   Minute Volume (L/min) 16.4 Liters   Mask Leak (lpm) 74 lpm   Patient's Home Machine No   Alarm Settings   Alarms On Y   Low Pressure (cmH2O) 5 cmH2O   High Pressure (cmH2O) 28 cmH2O   RR Low (bpm) 16   RR High (bpm) 45 br/min

## 2024-04-19 NOTE — PROGRESS NOTES
04/19/24 1043   NIV Type   Ventilator ID [id number]   NIV Started/Stopped On   Equipment Type v60   Mode Bilevel   Mask Type Full face mask   Mask Size Large   Assessment   Pulse 78   Heart Rate Source Monitor   Respirations (!) 37   SpO2 99 %   Level of Consciousness 0   Comfort Level Good   Using Accessory Muscles Yes   Mask Compliance Good   Skin Assessment Redness (see comment/note)   Skin Protection for O2 Device Yes   Orientation Middle   Location Nose   Intervention(s) Skin Barrier   Breath Sounds   Breath Sounds Bilateral Crackles    Right Upper Lobe Crackles;Rhonchi   Right Middle Lobe Crackles   Right Lower Lobe Crackles   Left Upper Lobe Crackles;Rhonchi   Left Lower Lobe Crackles   Settings/Measurements   PIP Observed 17 cm H20   IPAP 16 cmH20   CPAP/EPAP 10 cmH2O   Vt (Measured) 406 mL   Rate Ordered 16   Insp Rise Time (%) 3 %   FiO2  50 %   I Time/ I Time % 0.65 s   Minute Volume (L/min) 12.1 Liters   Mask Leak (lpm) 72 lpm   Patient's Home Machine No   Alarm Settings   Alarms On Y   Low Pressure (cmH2O) 5 cmH2O   High Pressure (cmH2O) 28 cmH2O   RR Low (bpm) 16   RR High (bpm) 45 br/min

## 2024-04-19 NOTE — PROGRESS NOTES
Nephrology Progress Note  [facility]      CC:       HPI:   The patient is an 83 year old male with a past medical history that includes COPD, duodenal ulcer, CAD with cardiac stenting, HTN, HLD, valvular heart disease, DVT, arthritis. The patient presented to the ED on 4/11 with c/o shortness of breath with cough and fever that started a few days ago. The patient's VS in the ED were /67  Pulse 98  Temp 98.6 °F (37 °C) (Axillary)  Resp 25  SpO2 97% . His initial labs in the ED were significant for K+ 6.2, CO@ 23, BUN 42, Cr 1.9, GFR 35, mg 1.7, PO4 7.7, BNP 5441, troponin 105, WBC 26, hgb 8.2. The patient's CXR noted right airspace density likely pneumonia. The patient received treatment for hyperkalemia and received ceftriaxone and doxycycline. The patient was found unresponsive upon arrival to the sixth floor from the ED and an RRT was called and the patient was found to have hypercapneic ABG results and was placed on AVAPS. Upon assessment today the patient is easily aroused and attempts to answer questions but is disoriented and difficult to understand. Remains on NIV.     Interval History:    4/14: Seen and examined.  Patient was transferred to the medical intensive care unit last evening and was intubated due to persistent hypercarbia.  Making acceptable urine output.  Sedated.  Discussed with wife and nurse at bedside.  No further events this morning.    4/15/24: pt seen and examined in icu, chart reviewed. Intubated, family at bedside    4/16: pt seen in icu, remains intubated.     4/17: pt seen in icu, intubated, 2 visitors present, pt more alert    4/18: pt seen and examined in icu, intubated    4/19: pt seen and examined in iuc, extubated, family present      PMH:          Past Medical History:   Diagnosis Date    Arthritis     CAD (coronary artery disease)     COPD (chronic obstructive pulmonary disease) (HCC)     Duodenal ulcer 4/12/2018    Hyperlipidemia     Hypertension     Valvular  Vitals for the past 24 hrs:   BP Temp Temp src Pulse Resp SpO2   04/19/24 1000 (!) 69/44 -- -- 78 (!) 39 98 %   04/19/24 0925 -- -- -- 93 (!) 36 96 %   04/19/24 0900 (!) 73/41 -- -- (!) 103 22 98 %   04/19/24 0857 -- -- -- (!) 105 (!) 33 97 %   04/19/24 0850 -- -- -- (!) 114 (!) 45 (!) 86 %   04/19/24 0844 -- -- -- (!) 121 (!) 40 (!) 86 %   04/19/24 0835 -- -- -- (!) 117 (!) 38 91 %   04/19/24 0807 -- -- -- 72 (!) 33 94 %   04/19/24 0806 -- -- -- 70 30 94 %   04/19/24 0805 -- -- -- 73 28 95 %   04/19/24 0800 (!) 143/59 97.9 °F (36.6 °C) Temporal 61 23 95 %   04/19/24 0700 (!) 129/56 -- -- 59 20 96 %   04/19/24 0600 (!) 127/50 -- -- 57 20 95 %   04/19/24 0500 (!) 155/62 -- -- 61 23 96 %   04/19/24 0400 (!) 148/60 97.4 °F (36.3 °C) Temporal 63 23 94 %   04/19/24 0300 (!) 121/57 -- -- 71 20 100 %   04/19/24 0200 (!) 135/59 -- -- 58 20 96 %   04/19/24 0100 129/60 -- -- 64 20 96 %   04/19/24 0056 -- -- -- 68 20 96 %   04/19/24 0000 (!) 143/65 97.9 °F (36.6 °C) Temporal 65 20 98 %   04/18/24 2300 (!) 101/48 -- -- 61 20 97 %   04/18/24 2200 125/60 -- -- 59 20 96 %   04/18/24 2100 (!) 92/47 -- -- 73 20 95 %   04/18/24 2023 -- -- -- 69 20 96 %   04/18/24 2000 (!) 99/50 97.9 °F (36.6 °C) Temporal 62 20 96 %   04/18/24 1900 (!) 94/51 -- -- 74 20 96 %   04/18/24 1800 (!) 78/46 -- -- 87 29 93 %   04/18/24 1700 (!) 132/57 -- -- 68 20 95 %   04/18/24 1627 -- -- -- 69 20 95 %   04/18/24 1600 (!) 113/52 98.1 °F (36.7 °C) Temporal 69 20 96 %   04/18/24 1500 (!) 107/52 -- -- 68 20 95 %   04/18/24 1400 (!) 109/53 -- -- 71 20 94 %   04/18/24 1300 (!) 104/51 -- -- 70 20 97 %   04/18/24 1207 -- -- -- 67 20 96 %   04/18/24 1200 (!) 94/47 98 °F (36.7 °C) Temporal 69 20 95 %   04/18/24 1100 (!) 75/42 -- -- 73 21 93 %   04/18/24 1035 -- -- -- 91 24 99 %       I/O last 3 completed shifts:  In: 4497.4 [I.V.:350.8; NG/GT:2817; IV Piggyback:1329.6]  Out: 2800 [Urine:2800]  No intake/output data recorded.       No data found.      General

## 2024-04-19 NOTE — PROGRESS NOTES
04/19/24 0835   Patient Observation   Pulse (!) 117   Respirations (!) 38   SpO2 91 %   Ventilator Settings   FiO2  35 %   PEEP/CPAP (cmH2O) (S)  5  (PARAMETERS)   Pressure Support (cm H2O) (S)  5 cm H2O   Vent Patient Data (Readings)   Vt (Measured) 442 mL   Peak Inspiratory Pressure (cmH2O) 12 cmH2O   Rate Measured 35 br/min   Minute Volume (L/min) 18.5 Liters   Mean Airway Pressure (cmH2O) 9 cmH20   Plateau Pressure (cm H2O) 18 cm H2O   Driving Pressure 13   I:E Ratio 1:1.40   Vent Alarm Settings   High Pressure (cmH2O) 50 cmH2O   Low Minute Volume (lpm) 5 L/min   High Minute Volume (lpm) 20 L/min   Low Exhaled Vt (ml) 300 mL   High Exhaled Vt (ml) 900 mL   RR Low (bpm) 20   RR High (bpm) 40 br/min   Apnea (secs) 20 secs   Additional Respiratoray Assessments   Humidification Source Heated wire   Humidification Temp 37.1   Circuit Condensation Drained   Ambu Bag With Mask At Bedside Yes   Airway Clearance   Suction ET Tube;Oral   Suction Device Inline suction catheter   Sputum Method Obtained Endotracheal   Sputum Amount Large   Sputum Color/Odor Yellow   Sputum Consistency Thick     Spontaneous Parameters performed  Ps 5/5 positive cuff leak  VT = 442 ml  f = 35  B/M  Ve = 18.5 L/M  NIF = -32  cmH2O  VC = 378 L  RSBI = 108      Performed by Anusha Mathew RCP

## 2024-04-19 NOTE — PLAN OF CARE
Progressing     Problem: Pain  Goal: Verbalizes/displays adequate comfort level or baseline comfort level  4/19/2024 0755 by Delaney Huston, RN  Outcome: Progressing     Problem: Nutrition Deficit:  Goal: Optimize nutritional status  4/19/2024 0755 by Delaney Huston, RN  Outcome: Progressing     Problem: Respiratory - Adult  Goal: Achieves optimal ventilation and oxygenation  4/19/2024 0755 by Delaney Huston, RN  Outcome: Progressing

## 2024-04-19 NOTE — PROGRESS NOTES
04/19/24 1824   NIV Type   Ventilator ID [id number]   NIV Started/Stopped On   Equipment Type v60   Mode Bilevel   Mask Type Full face mask   Mask Size Large   Assessment   Pulse 78   Heart Rate Source Monitor   Respirations 24   SpO2 98 %   Level of Consciousness 0   Comfort Level Good   Using Accessory Muscles Yes   Mask Compliance Fair   Skin Assessment Redness (see comment/note)   Skin Protection for O2 Device Yes   Orientation Middle   Location Nose   Intervention(s) Skin Barrier   Breath Sounds   Breath Sounds Bilateral Crackles ;Diminished   Right Upper Lobe Crackles;Diminished   Right Middle Lobe Crackles;Diminished   Right Lower Lobe Crackles;Diminished   Left Upper Lobe Crackles;Diminished   Left Lower Lobe Crackles;Diminished   Settings/Measurements   PIP Observed 16 cm H20   IPAP 16 cmH20   CPAP/EPAP 10 cmH2O   Vt (Measured) 441 mL   Rate Ordered 16   Insp Rise Time (%) 3 %   FiO2  50 %   I Time/ I Time % 0.65 s   Minute Volume (L/min) 11.3 Liters   Mask Leak (lpm) 71 lpm   Patient's Home Machine No   Alarm Settings   Alarms On Y   Low Pressure (cmH2O) 5 cmH2O   High Pressure (cmH2O) 28 cmH2O   RR Low (bpm) 16   RR High (bpm) 45 br/min

## 2024-04-19 NOTE — PLAN OF CARE
Problem: Respiratory - Adult  Goal: Achieves optimal ventilation and oxygenation  4/19/2024 0059 by Lina Xie RCP  Outcome: Progressing

## 2024-04-19 NOTE — PROGRESS NOTES
Extubated   Alert and responsive  Afebrile with hypotension  Denies pain or other complaint  RRR  Lungs more clear  Abdomen soft without tenderness  Discussed with his wife

## 2024-04-19 NOTE — PLAN OF CARE
Problem: Safety - Adult  Goal: Free from fall injury  Outcome: Progressing     Problem: Discharge Planning  Goal: Discharge to home or other facility with appropriate resources  Outcome: Progressing     Problem: Skin/Tissue Integrity  Goal: Absence of new skin breakdown  Description: 1.  Monitor for areas of redness and/or skin breakdown  2.  Assess vascular access sites hourly  3.  Every 4-6 hours minimum:  Change oxygen saturation probe site  4.  Every 4-6 hours:  If on nasal continuous positive airway pressure, respiratory therapy assess nares and determine need for appliance change or resting period.  Outcome: Progressing     Problem: Confusion  Goal: Confusion, delirium, dementia, or psychosis is improved or at baseline  Description: INTERVENTIONS:  1. Assess for possible contributors to thought disturbance, including medications, impaired vision or hearing, underlying metabolic abnormalities, dehydration, psychiatric diagnoses, and notify attending LIP  2. Dexter high risk fall precautions, as indicated  3. Provide frequent short contacts to provide reality reorientation, refocusing and direction  4. Decrease environmental stimuli, including noise as appropriate  5. Monitor and intervene to maintain adequate nutrition, hydration, elimination, sleep and activity  6. If unable to ensure safety without constant attention obtain sitter and review sitter guidelines with assigned personnel  7. Initiate Psychosocial CNS and Spiritual Care consult, as indicated  Outcome: Progressing     Problem: Safety - Medical Restraint  Goal: Remains free of injury from restraints (Restraint for Interference with Medical Device)  Description: INTERVENTIONS:  1. Determine that other, less restrictive measures have been tried or would not be effective before applying the restraint  2. Evaluate the patient's condition at the time of restraint application  3. Inform patient/family regarding the reason for restraint  4. Q2H:  Monitor safety, psychosocial status, comfort, nutrition and hydration  4/18/2024 2032 by Vanda Barcenas, RN  Outcome: Progressing  Flowsheets (Taken 4/18/2024 2000)  Remains free of injury from restraints (restraint for interference with medical device): Every 2 hours: Monitor safety, psychosocial status, comfort, nutrition and hydration  4/18/2024 1808 by Delaney Huston, RN  Outcome: Progressing  Flowsheets (Taken 4/18/2024 0938)  Remains free of injury from restraints (restraint for interference with medical device): Determine that other, less restrictive measures have been tried or would not be effective before applying the restraint     Problem: Pain  Goal: Verbalizes/displays adequate comfort level or baseline comfort level  4/18/2024 2032 by Vanda Barcenas RN  Outcome: Progressing  4/18/2024 1808 by Delaney Huston RN  Outcome: Progressing     Problem: Nutrition Deficit:  Goal: Optimize nutritional status  Outcome: Progressing     Problem: Respiratory - Adult  Goal: Achieves optimal ventilation and oxygenation  Outcome: Progressing     Problem: ABCDS Injury Assessment  Goal: Absence of physical injury  Outcome: Progressing

## 2024-04-19 NOTE — PROGRESS NOTES
[facility]  Department of Internal Medicine   Internal Medicine Residency   MICU Progress Note    Patient:  Virgilio Sanchez 83 y.o. male  MRN: [id number]     Date of Service: 4/19/2024    Allergy: Patient has no known allergies.    Overnight Events: No significant events overnight.    Subjective     Patient was extubated to BiPAP.    ROS: Denies fever, chills, chest pain, shortness of breath, N/V/C/D, dysuria or blood in stool or urine.    Objective     VS:   Patient Vitals for the past 12 hrs:   BP Temp Temp src Pulse Resp SpO2   04/19/24 1227 -- -- -- 91 (!) 34 (!) 89 %   04/19/24 1200 (!) 117/50 98 °F (36.7 °C) Temporal 75 (!) 35 90 %   04/19/24 1111 -- -- -- 73 (!) 33 99 %   04/19/24 1100 (!) 72/44 -- -- 73 (!) 32 100 %   04/19/24 1043 -- -- -- 78 (!) 37 99 %   04/19/24 1000 (!) 69/44 -- -- 78 (!) 39 98 %   04/19/24 0925 -- -- -- 93 (!) 36 96 %   04/19/24 0900 (!) 73/41 -- -- (!) 103 22 98 %   04/19/24 0857 -- -- -- (!) 105 (!) 33 97 %   04/19/24 0850 -- -- -- (!) 114 (!) 45 (!) 86 %   04/19/24 0844 -- -- -- (!) 121 (!) 40 (!) 86 %   04/19/24 0835 -- -- -- (!) 117 (!) 38 91 %   04/19/24 0807 -- -- -- 72 (!) 33 94 %   04/19/24 0806 -- -- -- 70 30 94 %   04/19/24 0805 -- -- -- 73 28 95 %   04/19/24 0800 (!) 143/59 97.9 °F (36.6 °C) Temporal 61 23 95 %   04/19/24 0700 (!) 129/56 -- -- 59 20 96 %   04/19/24 0600 (!) 127/50 -- -- 57 20 95 %   04/19/24 0500 (!) 155/62 -- -- 61 23 96 %   04/19/24 0400 (!) 148/60 97.4 °F (36.3 °C) Temporal 63 23 94 %   04/19/24 0300 (!) 121/57 -- -- 71 20 100 %   ]    I & O - 24hr:   Intake/Output Summary (Last 24 hours) at 4/19/2024 1409  Last data filed at 4/19/2024 0650  Gross per 24 hour   Intake 2604.89 ml   Output 825 ml   Net 1779.89 ml       Net IO Since Admission: 6,434.59 mL [04/19/24 1409]    Sedatives: None    Pressors: None    Oxygen: BiPAP  transitioned to nasal cannula.    ABG:       Recent Labs     04/18/24  0438 04/19/24  0422 04/19/24  1014   PH 7.484*  Imdur, lisinopril and metoprolol due to low blood pressure.  Continue amlodipine.     PT/OT evaluation: not indicated at this time   DVT prophylaxis: Lovenox  GI prophylaxis: Pantoprazole  Diet:   Diet NPO   Bowel regimen: GlycoLax daily.  Pain management: as needed  Code status: DNR-CCA   Disposition: continue current care  Family: updated as available    Reed Hernandez MD, PGY-2  Attending physician: Dr. Fleming

## 2024-04-19 NOTE — PROGRESS NOTES
Physical Therapy    Physical Therapy Initial Assessment     Name: Virgilio Sanchez  : 1940  MRN: [id number]      Date of Service: 2024    Evaluating PT:  August Logan, PT, DPT  [id number]     Room #:  4405/4405-A  Diagnosis:  Hypoxia [R09.02]  Acute on chronic respiratory failure (HCC) [J96.20]  Pneumonia due to infectious organism [J18.9]  Pneumonia due to infectious organism, unspecified laterality, unspecified part of lung [J18.9]  PMHx/PSHx:   has a past medical history of Arthritis, CAD (coronary artery disease), COPD (chronic obstructive pulmonary disease) (HCC), Duodenal ulcer, Hyperlipidemia, Hypertension, and Valvular heart disease.   Procedure/Surgery:  Intubated ;  Extubated    Precautions:  Falls, BIPAP, NGT  Equipment Needs:  TBD    SUBJECTIVE:    Pt lives with his wife in a 1 floor apartment with 2 steps and rail to enter building then additional 6 steps with rail to apartment.  Pt ambulates with SPC in community and FWW at home.  Pt wears 3-4 L O2 at baseline.     OBJECTIVE:   Initial Evaluation  Date: 24  Treatment Short Term/ Long Term   Goals   AM-PAC 6 Clicks      Was pt agreeable to Eval/treatment? Yes      Does pt have pain? No c/o pain     Bed Mobility  Rolling: Mod A  Supine to sit: Mod A  Sit to supine: Mod A  Scooting: Mod A  Rolling: SBA  Supine to sit: SBA  Sit to supine: SBA  Scooting: SBA   Transfers Sit to stand: NT  Stand to sit: NT  Stand pivot: NT  Sit to stand: SBA  Stand to sit: SBA  Stand pivot: SBA with AAD   Ambulation    NT  >50 feet with AAD SBA   Stair negotiation: ascended and descended  NT  >2 steps with 1 rail Min A   ROM BUE:  Per OT eval   BLE:  WFl     Strength BUE:  Per OT eval   BLE:  grossly 3+/5     Balance Sitting EOB:  Min A  Dynamic Standing:  NT  Sitting EOB:  SBA  Dynamic Standing:  SBA     Pt is A & O x self, location with cues, month/year with options   RASS:  0 to +1  CAM-ICU:  Positive   Sensation:  Pt denies numbness and tingling to

## 2024-04-19 NOTE — PROGRESS NOTES
04/19/24 1111   Oxygen Therapy/Pulse Ox   O2 Therapy Oxygen   O2 Device (S)  Venturi-mask   O2 Flow Rate (L/min) (S)  10 L/min   FiO2  (S)  50 %   Pulse 73   Respirations (!) 33   SpO2 99 %

## 2024-04-19 NOTE — PROGRESS NOTES
04/19/24 0844   Oxygen Therapy/Pulse Ox   O2 Therapy Oxygen humidified   $Oxygen $Daily Charge   O2 Device High flow nasal cannula   O2 Flow Rate (L/min) 15 L/min  (patient continuing to desaturate on High flow nasal cannula, RT placed patient on NIV)   FiO2  35 %   Pulse (!) 121   Respirations (!) 40   SpO2 (!) 86 %     Patient was extubated to 15 liters/min via  high flow  . Breath Sounds post extubation were rhonci/crackles. Stridor was not present post extubation. SPO2 was 86%.      Performed by  Anusha Mathew RCP

## 2024-04-19 NOTE — PROGRESS NOTES
04/19/24 0850   NIV Type   $NIV $Daily Charge   Ventilator ID [id number]   Suction Setup and Functional Yes   NIV Started/Stopped On   Equipment Type v60   Mode Bilevel   Mask Type Full face mask   Mask Size Large   Assessment   Pulse (!) 114   Heart Rate Source Monitor   Respirations (!) 45   SpO2 (!) 86 %   Level of Consciousness 0   Comfort Level Good   Using Accessory Muscles Yes   Mask Compliance Good   Skin Assessment Redness (see comment/note)   Skin Protection for O2 Device Yes   Orientation Middle   Location Nose   Intervention(s) Skin Barrier   Breath Sounds   Right Upper Lobe Rhonchi;Crackles   Right Middle Lobe Crackles   Right Lower Lobe Crackles   Left Upper Lobe Crackles;Rhonchi   Left Lower Lobe Crackles   Settings/Measurements   PIP Observed 17 cm H20   IPAP 16 cmH20   CPAP/EPAP 10 cmH2O   Vt (Measured) 465 mL   Rate Ordered 16   Insp Rise Time (%) 3 %   FiO2  (S)  60 %   I Time/ I Time % 0.65 s   Minute Volume (L/min) 18.4 Liters   Mask Leak (lpm) 64 lpm   Patient's Home Machine No   Alarm Settings   Alarms On Y   Low Pressure (cmH2O) 5 cmH2O   High Pressure (cmH2O) 28 cmH2O   RR Low (bpm) 16   RR High (bpm) 45 br/min

## 2024-04-19 NOTE — PROGRESS NOTES
04/19/24 1227   NIV Type   NIV Started/Stopped (S)  On  (placed back on NIV for increased work of breathing and desaturation on venti mask)   Equipment Type v60   Mode Bilevel   Mask Type Full face mask   Mask Size Large   Assessment   Pulse 91   Heart Rate Source Monitor   Respirations (!) 34   SpO2 (!) 89 %   Level of Consciousness 0   Comfort Level Good   Using Accessory Muscles Yes   Mask Compliance Fair   Skin Assessment Redness (see comment/note)   Orientation Middle   Location Nose   Intervention(s) Skin Barrier   Settings/Measurements   PIP Observed 16 cm H20   IPAP 16 cmH20   CPAP/EPAP 10 cmH2O   Vt (Measured) 451 mL   Rate Ordered 16   Insp Rise Time (%) 3 %   FiO2  50 %   I Time/ I Time % 0.65 s   Minute Volume (L/min) 13 Liters   Mask Leak (lpm) 76 lpm   Patient's Home Machine No   Alarm Settings   Alarms On Y   Low Pressure (cmH2O) 5 cmH2O   High Pressure (cmH2O) 28 cmH2O   RR Low (bpm) 16   RR High (bpm) 45 br/min

## 2024-04-19 NOTE — PROGRESS NOTES
Comprehensive Nutrition Assessment    Type and Reason for Visit:  Reassess    Nutrition Recommendations/Plan:   Continue NPO    Start TF if unable to advance PO diet w/ bipap    TF rec:   Standard w/ fiber (Jevity 1.5) @ 50 ml/hr + 1 protein modular daily   Provides 1200ml tv, 1800 kcals, 77gm pro (1904 kcals, 103 gm pro w/ mod), 912 ml free water   Regimen meets 100% estimated nutrient needs     Malnutrition Assessment:  Malnutrition Status:  Moderate malnutrition (04/14/24 1134)    Context:  Chronic Illness     Findings of the 6 clinical characteristics of malnutrition:  Energy Intake:  75% or less estimated energy requirements for 1 month or longer  Weight Loss:  No significant weight loss     Body Fat Loss:  Mild body fat loss Orbital   Muscle Mass Loss:  Mild muscle mass loss Temples (temporalis), Clavicles (pectoralis & deltoids)  Fluid Accumulation:  No significant fluid accumulation     Strength:  Not Performed    Nutrition Assessment:    Pt remains at risk now extubated to bipap. Admit w/ AMS, acute resp failure requiring intubation/transfer for MICU. Noted DEIDRE, anemia, PNA, COPD exacerbation & sepsis. Meets criteria for moderate malnutrition. PMHx CAD, HLD, malnutrition, remote hx ETOH abuse. Will provide updated TF recs and monitor for nutrition progression.    Nutrition Related Findings:    pt extubated to bipap, NGT clamped, active BS, soft abd, +1 pitting edema, -I/Os 3.6L, intermittent hypotension (not on pressors), hypokalemia   Wound Type: Open Wounds       Current Nutrition Intake & Therapies:    Average Meal Intake: NPO     Diet NPO    Anthropometric Measures:  Height: 182.9 cm (6')  Ideal Body Weight (IBW): 178 lbs (81 kg)    Admission Body Weight: 73.9 kg (163 lb) (4/11 actual)  Current Body Weight: 73.9 kg (163 lb) (4/11 bed scale), 91.6 % IBW.    Current BMI (kg/m2): 22.1  Usual Body Weight: 71.2 kg (157 lb) (11/2023 bed scale, per EMR)  % Weight Change (Calculated): 3.8

## 2024-04-19 NOTE — PROGRESS NOTES
Palliative Care Department  [phone]  Palliative Care Progress Note  Provider Christine Bowen, APRN - CNP     Virgilio Sanchez  [id number]  Hospital Day: 9  Date of Initial Consult: 4/13/2024  Referring Provider:  Noel Peters DO  Palliative Medicine was consulted for assistance with: goals of care, code status discussion    HPI:   Virgilio Sanchez is a 83 y.o. with a medical history of severe COPD, chronic hypoxic respiratory failure, coronary artery disease disease with cardiac stenting, nicotine dependence in remission since 2018 with 50-pack-year history of smoking, DVT valvular heart disease, nonrheumatic aortic valve stenosis, hypertension hyperlipidemia prior alcohol abuse recovering alcoholic with a massive GI bleed requiring embolization of the gastroduodenal artery in 2018 who was admitted on 4/11/2024 from home with a CHIEF COMPLAINT of SOB, AMS. Was seen by pulmonology, placed initially on noninvasive ventilation/AVAPS.  However due to lack of improvement clinically and on blood gas, patient was transferred to ICU for consideration of intubation and mechanical ventilation. Chest x-ray showed left upper lobe airspace disease. Nephrology following. Palliative medicine consulted for further assistance.     ASSESSMENT/PLAN:     Pertinent Hospital Diagnoses     Acute hypoxic hypercapnic respiratory failure  Left upper lobe pneumonia  COPD exacerbation   Sepsis  GPC bacteremia   Acute metabolic encephalopathy   CAD / stent  Ischemic cardiomyopathy EF 45   Hyponatremia/hyperkalemia/acute kidney injury      Palliative Care Encounter / Counseling Regarding Goals of Care  Please see detailed goals of care discussion as below  At this time, Virgilio Sanchez, Does Not have capacity for medical decision-making.  Capacity is time limited and situation/question specific  During encounter Janet was surrogate medical decision-maker  Outcome of goals of care meeting:   Continue current medical management  Continue DNR

## 2024-04-19 NOTE — PROGRESS NOTES
04/19/24 0857   NIV Type   Equipment Type v60   Assessment   Pulse (!) 105   Respirations (!) 33   SpO2 (S)  97 %   Settings/Measurements   IPAP 16 cmH20   CPAP/EPAP 10 cmH2O   FiO2  60 %

## 2024-04-19 NOTE — PROGRESS NOTES
Department of Internal Medicine  Infectious Diseases  Progress  Note      C/C : Strep  Bacteremia , Pneumonia         All above noted   Pt's extubated ( 4/20)   On BiPAP at present  Reports SOB     Current Facility-Administered Medications   Medication Dose Route Frequency Provider Last Rate Last Admin    potassium bicarb-citric acid (EFFER-K) effervescent tablet 40 mEq  40 mEq Oral BID Emily Norris APRN - CNP   40 mEq at 04/19/24 0800    gabapentin (NEURONTIN) capsule 100 mg  100 mg Oral TID Raul Prakash MD   100 mg at 04/19/24 0800    QUEtiapine (SEROQUEL) tablet 75 mg  75 mg Oral BID Raul Prakash MD   75 mg at 04/19/24 0800    pantoprazole (PROTONIX) 40 mg in sodium chloride (PF) 0.9 % 10 mL injection  40 mg IntraVENous Daily Raul Prakash MD   40 mg at 04/19/24 0800    ferrous sulfate (IRON 325) tablet 325 mg  325 mg Oral BID WC Raul Prakash MD   325 mg at 04/19/24 0800    ceFEPIme (MAXIPIME) 2,000 mg in sodium chloride 0.9 % 100 mL IVPB ([id number])  2,000 mg IntraVENous Q8H LimbuMagnus MD 25 mL/hr at 04/19/24 0906 2,000 mg at 04/19/24 0906    isosorbide dinitrate (ISORDIL) tablet 20 mg  20 mg Oral TID Raul Prakash MD   20 mg at 04/19/24 0800    polyethylene glycol (GLYCOLAX) packet 17 g  17 g Oral Daily Reed Hernandez MD   17 g at 04/17/24 0856    dexmedeTOMIDine (PRECEDEX) 1,000 mcg in sodium chloride 0.9 % 250 mL infusion  0.1-1.5 mcg/kg/hr IntraVENous Continuous Raul Prakash MD 7.4 mL/hr at 04/19/24 0736 0.4 mcg/kg/hr at 04/19/24 0736    fentaNYL (SUBLIMAZE) injection 25 mcg  25 mcg IntraVENous Once Raul Prakash MD        sodium chloride flush 0.9 % injection 5-40 mL  5-40 mL IntraVENous 2 times per day Reed Hernandez MD   10 mL at 04/19/24 0801    sodium chloride flush 0.9 % injection 5-40 mL  5-40 mL IntraVENous PRN Reed Hernandez MD        0.9 % sodium chloride infusion   IntraVENous PRN Reed Hernandez MD        heparin (PF) 100 UNIT/ML injection 100 Units  1 mL IntraCATHeter

## 2024-04-20 ENCOUNTER — APPOINTMENT (OUTPATIENT)
Dept: GENERAL RADIOLOGY | Age: 84
End: 2024-04-20
Payer: MEDICARE

## 2024-04-20 VITALS
HEART RATE: 83 BPM | SYSTOLIC BLOOD PRESSURE: 57 MMHG | RESPIRATION RATE: 20 BRPM | DIASTOLIC BLOOD PRESSURE: 32 MMHG | WEIGHT: 163.14 LBS | TEMPERATURE: 97.3 F | HEIGHT: 72 IN | BODY MASS INDEX: 22.1 KG/M2 | OXYGEN SATURATION: 80 %

## 2024-04-20 PROBLEM — Z71.89 GOALS OF CARE, COUNSELING/DISCUSSION: Status: ACTIVE | Noted: 2024-04-20

## 2024-04-20 PROBLEM — Z51.5 PALLIATIVE CARE BY SPECIALIST: Status: ACTIVE | Noted: 2024-04-20

## 2024-04-20 LAB
ANION GAP SERPL CALCULATED.3IONS-SCNC: 14 MMOL/L (ref 7–16)
BASOPHILS # BLD: 0.19 K/UL (ref 0–0.2)
BASOPHILS NFR BLD: 1 % (ref 0–2)
BUN SERPL-MCNC: 29 MG/DL (ref 6–23)
CALCIUM SERPL-MCNC: 8.3 MG/DL (ref 8.6–10.2)
CHLORIDE SERPL-SCNC: 111 MMOL/L (ref 98–107)
CO2 SERPL-SCNC: 24 MMOL/L (ref 22–29)
CREAT SERPL-MCNC: 1.2 MG/DL (ref 0.7–1.2)
EOSINOPHIL # BLD: 0.19 K/UL (ref 0.05–0.5)
EOSINOPHILS RELATIVE PERCENT: 1 % (ref 0–6)
ERYTHROCYTE [DISTWIDTH] IN BLOOD BY AUTOMATED COUNT: 23.5 % (ref 11.5–15)
GFR SERPL CREATININE-BSD FRML MDRD: 58 ML/MIN/1.73M2
GLUCOSE BLD-MCNC: 115 MG/DL (ref 74–99)
GLUCOSE BLD-MCNC: 67 MG/DL (ref 74–99)
GLUCOSE SERPL-MCNC: 59 MG/DL (ref 74–99)
HCT VFR BLD AUTO: 30.6 % (ref 37–54)
HGB BLD-MCNC: 8.4 G/DL (ref 12.5–16.5)
LYMPHOCYTES NFR BLD: 1.55 K/UL (ref 1.5–4)
LYMPHOCYTES RELATIVE PERCENT: 7 % (ref 20–42)
MAGNESIUM SERPL-MCNC: 2.1 MG/DL (ref 1.6–2.6)
MCH RBC QN AUTO: 22.2 PG (ref 26–35)
MCHC RBC AUTO-ENTMCNC: 27.5 G/DL (ref 32–34.5)
MCV RBC AUTO: 81 FL (ref 80–99.9)
MONOCYTES NFR BLD: 0.58 K/UL (ref 0.1–0.95)
MONOCYTES NFR BLD: 3 % (ref 2–12)
NEUTROPHILS NFR BLD: 89 % (ref 43–80)
NEUTS SEG NFR BLD: 19.78 K/UL (ref 1.8–7.3)
PHOSPHATE SERPL-MCNC: 2.7 MG/DL (ref 2.5–4.5)
PLATELET, FLUORESCENCE: 253 K/UL (ref 130–450)
PMV BLD AUTO: 11.7 FL (ref 7–12)
POTASSIUM SERPL-SCNC: 3.8 MMOL/L (ref 3.5–5)
RBC # BLD AUTO: 3.78 M/UL (ref 3.8–5.8)
RBC # BLD: ABNORMAL 10*6/UL
SODIUM SERPL-SCNC: 149 MMOL/L (ref 132–146)
WBC OTHER # BLD: 22.3 K/UL (ref 4.5–11.5)

## 2024-04-20 PROCEDURE — 2580000003 HC RX 258: Performed by: CHIROPRACTOR

## 2024-04-20 PROCEDURE — 2580000003 HC RX 258: Performed by: INTERNAL MEDICINE

## 2024-04-20 PROCEDURE — 84100 ASSAY OF PHOSPHORUS: CPT

## 2024-04-20 PROCEDURE — 6370000000 HC RX 637 (ALT 250 FOR IP): Performed by: INTERNAL MEDICINE

## 2024-04-20 PROCEDURE — 2580000003 HC RX 258

## 2024-04-20 PROCEDURE — 6370000000 HC RX 637 (ALT 250 FOR IP): Performed by: NURSE PRACTITIONER

## 2024-04-20 PROCEDURE — 99231 SBSQ HOSP IP/OBS SF/LOW 25: CPT

## 2024-04-20 PROCEDURE — 6360000002 HC RX W HCPCS

## 2024-04-20 PROCEDURE — 80048 BASIC METABOLIC PNL TOTAL CA: CPT

## 2024-04-20 PROCEDURE — A4216 STERILE WATER/SALINE, 10 ML: HCPCS | Performed by: INTERNAL MEDICINE

## 2024-04-20 PROCEDURE — C9113 INJ PANTOPRAZOLE SODIUM, VIA: HCPCS | Performed by: INTERNAL MEDICINE

## 2024-04-20 PROCEDURE — 94640 AIRWAY INHALATION TREATMENT: CPT

## 2024-04-20 PROCEDURE — 6360000002 HC RX W HCPCS: Performed by: INTERNAL MEDICINE

## 2024-04-20 PROCEDURE — 71045 X-RAY EXAM CHEST 1 VIEW: CPT

## 2024-04-20 PROCEDURE — 94660 CPAP INITIATION&MGMT: CPT

## 2024-04-20 PROCEDURE — 2500000003 HC RX 250 WO HCPCS

## 2024-04-20 PROCEDURE — 85025 COMPLETE CBC W/AUTO DIFF WBC: CPT

## 2024-04-20 PROCEDURE — 6360000002 HC RX W HCPCS: Performed by: NURSE PRACTITIONER

## 2024-04-20 PROCEDURE — 2500000003 HC RX 250 WO HCPCS: Performed by: NURSE PRACTITIONER

## 2024-04-20 PROCEDURE — 99291 CRITICAL CARE FIRST HOUR: CPT | Performed by: INTERNAL MEDICINE

## 2024-04-20 PROCEDURE — 82962 GLUCOSE BLOOD TEST: CPT

## 2024-04-20 PROCEDURE — 83735 ASSAY OF MAGNESIUM: CPT

## 2024-04-20 RX ORDER — POTASSIUM CHLORIDE 7.45 MG/ML
10 INJECTION INTRAVENOUS ONCE
Status: COMPLETED | OUTPATIENT
Start: 2024-04-20 | End: 2024-04-20

## 2024-04-20 RX ORDER — BUMETANIDE 0.25 MG/ML
2 INJECTION INTRAMUSCULAR; INTRAVENOUS ONCE
Status: COMPLETED | OUTPATIENT
Start: 2024-04-20 | End: 2024-04-20

## 2024-04-20 RX ORDER — MORPHINE SULFATE 4 MG/ML
4 INJECTION, SOLUTION INTRAMUSCULAR; INTRAVENOUS
Status: DISCONTINUED | OUTPATIENT
Start: 2024-04-20 | End: 2024-04-21 | Stop reason: HOSPADM

## 2024-04-20 RX ORDER — LORAZEPAM 2 MG/ML
1 INJECTION INTRAMUSCULAR
Status: DISCONTINUED | OUTPATIENT
Start: 2024-04-20 | End: 2024-04-21 | Stop reason: HOSPADM

## 2024-04-20 RX ORDER — GLYCOPYRROLATE 0.2 MG/ML
0.2 INJECTION INTRAMUSCULAR; INTRAVENOUS EVERY 4 HOURS PRN
Status: DISCONTINUED | OUTPATIENT
Start: 2024-04-20 | End: 2024-04-21 | Stop reason: HOSPADM

## 2024-04-20 RX ORDER — LORAZEPAM 2 MG/ML
0.5 INJECTION INTRAMUSCULAR
Status: DISCONTINUED | OUTPATIENT
Start: 2024-04-20 | End: 2024-04-21 | Stop reason: HOSPADM

## 2024-04-20 RX ORDER — MORPHINE SULFATE 2 MG/ML
2 INJECTION, SOLUTION INTRAMUSCULAR; INTRAVENOUS
Status: DISCONTINUED | OUTPATIENT
Start: 2024-04-20 | End: 2024-04-21 | Stop reason: HOSPADM

## 2024-04-20 RX ADMIN — LORAZEPAM 1 MG: 2 INJECTION INTRAMUSCULAR; INTRAVENOUS at 12:40

## 2024-04-20 RX ADMIN — ANTI-FUNGAL POWDER MICONAZOLE NITRATE TALC FREE: 1.42 POWDER TOPICAL at 20:05

## 2024-04-20 RX ADMIN — SODIUM CHLORIDE, PRESERVATIVE FREE 10 ML: 5 INJECTION INTRAVENOUS at 20:05

## 2024-04-20 RX ADMIN — ENOXAPARIN SODIUM 40 MG: 100 INJECTION SUBCUTANEOUS at 08:15

## 2024-04-20 RX ADMIN — SODIUM CHLORIDE, PRESERVATIVE FREE 10 ML: 5 INJECTION INTRAVENOUS at 08:16

## 2024-04-20 RX ADMIN — MORPHINE SULFATE 4 MG: 4 INJECTION, SOLUTION INTRAMUSCULAR; INTRAVENOUS at 12:40

## 2024-04-20 RX ADMIN — VALPROATE SODIUM 250 MG: 100 INJECTION, SOLUTION INTRAVENOUS at 12:28

## 2024-04-20 RX ADMIN — MORPHINE SULFATE 2 MG: 2 INJECTION, SOLUTION INTRAMUSCULAR; INTRAVENOUS at 17:54

## 2024-04-20 RX ADMIN — GLYCOPYRROLATE 0.2 MG: 0.2 INJECTION INTRAMUSCULAR; INTRAVENOUS at 12:40

## 2024-04-20 RX ADMIN — AMLODIPINE BESYLATE 5 MG: 5 TABLET ORAL at 08:14

## 2024-04-20 RX ADMIN — LORAZEPAM 0.5 MG: 2 INJECTION INTRAMUSCULAR; INTRAVENOUS at 14:41

## 2024-04-20 RX ADMIN — BUDESONIDE 250 MCG: 0.25 INHALANT RESPIRATORY (INHALATION) at 08:17

## 2024-04-20 RX ADMIN — LORAZEPAM 0.5 MG: 2 INJECTION INTRAMUSCULAR; INTRAVENOUS at 15:46

## 2024-04-20 RX ADMIN — SODIUM CHLORIDE, PRESERVATIVE FREE 40 MG: 5 INJECTION INTRAVENOUS at 08:15

## 2024-04-20 RX ADMIN — CEFEPIME 2000 MG: 2 INJECTION, POWDER, FOR SOLUTION INTRAVENOUS at 10:10

## 2024-04-20 RX ADMIN — GUAIFENESIN 200 MG: 100 LIQUID ORAL at 08:15

## 2024-04-20 RX ADMIN — MORPHINE SULFATE 2 MG: 2 INJECTION, SOLUTION INTRAMUSCULAR; INTRAVENOUS at 14:41

## 2024-04-20 RX ADMIN — MORPHINE SULFATE 2 MG: 2 INJECTION, SOLUTION INTRAMUSCULAR; INTRAVENOUS at 15:46

## 2024-04-20 RX ADMIN — ANTI-FUNGAL POWDER MICONAZOLE NITRATE TALC FREE: 1.42 POWDER TOPICAL at 08:16

## 2024-04-20 RX ADMIN — GUAIFENESIN 200 MG: 100 LIQUID ORAL at 02:16

## 2024-04-20 RX ADMIN — IPRATROPIUM BROMIDE 0.5 MG: 0.5 SOLUTION RESPIRATORY (INHALATION) at 08:17

## 2024-04-20 RX ADMIN — DEXTROSE MONOHYDRATE 125 ML: 100 INJECTION, SOLUTION INTRAVENOUS at 07:38

## 2024-04-20 RX ADMIN — FERROUS SULFATE TAB 325 MG (65 MG ELEMENTAL FE) 325 MG: 325 (65 FE) TAB at 08:14

## 2024-04-20 RX ADMIN — LORAZEPAM 0.5 MG: 2 INJECTION INTRAMUSCULAR; INTRAVENOUS at 13:17

## 2024-04-20 RX ADMIN — GABAPENTIN 100 MG: 100 CAPSULE ORAL at 08:14

## 2024-04-20 RX ADMIN — CEFEPIME 2000 MG: 2 INJECTION, POWDER, FOR SOLUTION INTRAVENOUS at 02:19

## 2024-04-20 RX ADMIN — ATORVASTATIN CALCIUM 40 MG: 40 TABLET, FILM COATED ORAL at 08:13

## 2024-04-20 RX ADMIN — BUMETANIDE 2 MG: 0.25 INJECTION INTRAMUSCULAR; INTRAVENOUS at 10:07

## 2024-04-20 RX ADMIN — LORAZEPAM 0.5 MG: 2 INJECTION INTRAMUSCULAR; INTRAVENOUS at 17:55

## 2024-04-20 RX ADMIN — POTASSIUM BICARBONATE 40 MEQ: 782 TABLET, EFFERVESCENT ORAL at 08:14

## 2024-04-20 RX ADMIN — ARFORMOTEROL TARTRATE 15 MCG: 15 SOLUTION RESPIRATORY (INHALATION) at 08:17

## 2024-04-20 RX ADMIN — MORPHINE SULFATE 4 MG: 4 INJECTION, SOLUTION INTRAMUSCULAR; INTRAVENOUS at 13:17

## 2024-04-20 RX ADMIN — QUETIAPINE FUMARATE 75 MG: 25 TABLET ORAL at 08:14

## 2024-04-20 RX ADMIN — POTASSIUM CHLORIDE 10 MEQ: 7.46 INJECTION, SOLUTION INTRAVENOUS at 06:09

## 2024-04-20 RX ADMIN — IPRATROPIUM BROMIDE 0.5 MG: 0.5 SOLUTION RESPIRATORY (INHALATION) at 12:37

## 2024-04-20 RX ADMIN — ASPIRIN 81 MG: 81 TABLET, CHEWABLE ORAL at 08:14

## 2024-04-20 ASSESSMENT — PAIN SCALES - GENERAL
PAINLEVEL_OUTOF10: 0

## 2024-04-20 NOTE — PROGRESS NOTES
04/19/24 2350   NIV Type   Ventilator ID [id number]   Equipment Type v60   Mode Bilevel   Mask Type Full face mask   Mask Size Large   Assessment   Pulse 88   Respirations 22   SpO2 93 %   Level of Consciousness 0   Comfort Level Fair   Using Accessory Muscles Yes   Mask Compliance Fair   Skin Protection for O2 Device Yes   Orientation Middle   Location Nose   Intervention(s) Skin Barrier   Breath Sounds   Breath Sounds Bilateral Crackles ;Diminished   Right Upper Lobe Crackles;Diminished;Rhonchi   Right Middle Lobe Crackles   Right Lower Lobe Crackles   Left Upper Lobe Crackles;Diminished;Rhonchi   Left Lower Lobe Crackles   Settings/Measurements   PIP Observed 16 cm H20   IPAP 16 cmH20   CPAP/EPAP 10 cmH2O   Vt (Measured) 438 mL   Rate Ordered 14   FiO2  50 %   I Time/ I Time % (S)  0.8 s   Minute Volume (L/min) 11 Liters   Mask Leak (lpm) 120 lpm   Patient's Home Machine No   Alarm Settings   Alarms On Y     Date: 04/19/24    Time: 2350    Patient Placed On BIPAP/CPAP/ Non-Invasive Ventilation?  Bipap recheck    If no must comment.  Facial area red/color change? No           If YES are Blister/Lesion present?No   If yes must notify nursing staff  BIPAP/CPAP skin barrier?  Yes    Skin barrier type:mepilexlite       Comments:        Lina Xie RCP

## 2024-04-20 NOTE — PROGRESS NOTES
Patient is now DNR CC off BIPAP   Unresponsive with agonal respirations   RRR  No breath sounds audible  Discussed with Hospice and his family

## 2024-04-20 NOTE — PROGRESS NOTES
Palliative Care Department  [phone]  Palliative Care Progress Note  Provider Ruthie Ugarte, APRN - CNP     Virgilio Sanchez  [id number]  Hospital Day: 10  Date of Initial Consult: 4/13/2024  Referring Provider:  Noel Peters DO  Palliative Medicine was consulted for assistance with: goals of care, code status discussion    HPI:   Virgilio Sanchez is a 83 y.o. with a medical history of severe COPD, chronic hypoxic respiratory failure, coronary artery disease disease with cardiac stenting, nicotine dependence in remission since 2018 with 50-pack-year history of smoking, DVT valvular heart disease, nonrheumatic aortic valve stenosis, hypertension hyperlipidemia prior alcohol abuse recovering alcoholic with a massive GI bleed requiring embolization of the gastroduodenal artery in 2018 who was admitted on 4/11/2024 from home with a CHIEF COMPLAINT of SOB, AMS. Was seen by pulmonology, placed initially on noninvasive ventilation/AVAPS.  However due to lack of improvement clinically and on blood gas, patient was transferred to ICU for consideration of intubation and mechanical ventilation. Chest x-ray showed left upper lobe airspace disease. Nephrology following. Palliative medicine consulted for further assistance.     ASSESSMENT/PLAN:     Pertinent Hospital Diagnoses     Acute hypoxic hypercapnic respiratory failure  Left upper lobe pneumonia  COPD exacerbation   Sepsis  GPC bacteremia   Acute metabolic encephalopathy   CAD / stent  Ischemic cardiomyopathy EF 45   Hyponatremia/hyperkalemia/acute kidney injury      Palliative Care Encounter / Counseling Regarding Goals of Care  Please see detailed goals of care discussion as below  At this time, Virgilio Sanchez, Does Not have capacity for medical decision-making.  Capacity is time limited and situation/question specific  During encounter Janet was surrogate medical decision-maker  Outcome of goals of care meeting:   Change CODE STATUS to DNR-CC  Initiate comfort

## 2024-04-20 NOTE — PROGRESS NOTES
Chris Brown M.D.,[facility]  Ish Lagunas D.O., F.MARY KATE.ALAN.OJanetI., [facility]  Ansley Everett M.D.  Mary Alice Chavez M.D.   EZEQUIEL CansecoO.        Daily Pulmonary Progress Note    Patient:  Virgilio Sanchez 83 y.o. male MRN: [id number]            Synopsis     We are following patient for respiratory failure, CAP    \"CC\" SOB    Code status: DNR CCA      Subjective      Patient was seen and examined in the ICU.  He remains on the BiPAP 16/10, 50% with SpO2 97%.  Respiratory rate has been in the 30s.  There is been documentation where he is not tolerating being off the BiPAP for very long.  He is alert and demonstrating that he wants taken off the BiPAP at this time.    Review of Systems:  Unable to obtain    24-hour events:  No new events    Objective   OBJECTIVE:   BP (!) 143/63   Pulse (!) 117   Temp 97.3 °F (36.3 °C)   Resp 23   Ht 1.829 m (6')   Wt 74 kg (163 lb 2.3 oz)   SpO2 93%   BMI 22.13 kg/m²   SpO2 Readings from Last 1 Encounters:   04/20/24 93%        I/O:    Intake/Output Summary (Last 24 hours) at 4/20/2024 0901  Last data filed at 4/20/2024 0648  Gross per 24 hour   Intake 657.74 ml   Output 935 ml   Net -277.26 ml       Vent Information  Ventilator Day(s): 6  Ventilator ID: [id number]  Equipment Changed: (S) Humidification  Ventilator Initiate: Yes  Ventilator Discontinue: Yes (extubated per order.)  Vent Mode: (S) CPAP/PS (trialed on PS)       IPAP: 16 cmH20  CPAP/EPAP: 10 cmH2O     CURRENT MEDS :  Scheduled Meds:   potassium bicarb-citric acid  40 mEq Oral BID    gabapentin  100 mg Oral TID    QUEtiapine  75 mg Oral BID    pantoprazole (PROTONIX) 40 mg in sodium chloride (PF) 0.9 % 10 mL injection  40 mg IntraVENous Daily    ferrous sulfate  325 mg Oral BID WC    cefepime  2,000 mg IntraVENous Q8H    [Held by provider] isosorbide dinitrate  20 mg Oral TID    polyethylene glycol  17 g Oral Daily    sodium chloride flush  5-40 mL IntraVENous 2 times per day    epoetin ramses-epbx  2,000 Units

## 2024-04-20 NOTE — PROGRESS NOTES
Nephrology Progress Note  [facility]      CC:       HPI:   The patient is an 83 year old male with a past medical history that includes COPD, duodenal ulcer, CAD with cardiac stenting, HTN, HLD, valvular heart disease, DVT, arthritis. The patient presented to the ED on 4/11 with c/o shortness of breath with cough and fever that started a few days ago. The patient's VS in the ED were /67  Pulse 98  Temp 98.6 °F (37 °C) (Axillary)  Resp 25  SpO2 97% . His initial labs in the ED were significant for K+ 6.2, CO@ 23, BUN 42, Cr 1.9, GFR 35, mg 1.7, PO4 7.7, BNP 5441, troponin 105, WBC 26, hgb 8.2. The patient's CXR noted right airspace density likely pneumonia. The patient received treatment for hyperkalemia and received ceftriaxone and doxycycline. The patient was found unresponsive upon arrival to the sixth floor from the ED and an RRT was called and the patient was found to have hypercapneic ABG results and was placed on AVAPS. Upon assessment today the patient is easily aroused and attempts to answer questions but is disoriented and difficult to understand. Remains on NIV.       Patient Active Problem List   Diagnosis    HCAP (healthcare-associated pneumonia)    Melena    Rectal bleeding    Acute blood loss anemia    Duodenal ulcer    Aortic valve disease    Chronic obstructive lung disease (HCC)    Benign essential hypertension    Asymptomatic coronary heart disease    Pure hypercholesterolemia    Acute respiratory failure (HCC)    Acute hypoxic respiratory failure (HCC)    Acute anemia    Generalized edema    SOB (shortness of breath)    Other and unspecified hyperlipidemia    Chronic ischemic heart disease    Pneumonia due to infectious organism    Acute on chronic respiratory failure (HCC)    Palliative care encounter    Moderate protein-calorie malnutrition (HCC)    Hypoxia                 Interval History:    4/14: Seen and examined.  Patient was transferred to the medical intensive care unit  Date/Time    LAURENCE 29 04/12/2024 09:00 PM       No components found for: \"URIC\"    No results found for: \"LIPIDPAN\"      Assessment and Plan:    1. DEIDRE   due to decreased effective renal perfusion in the setting of AMS with decreased intake and ACEi   Baseline cr 1.1-1.2   Current Cr 1.2 at baseline,   Monitor ABG    3. AMS   secondary to acute on chronic hypercapnic respiratory failure    4. Hyperkalemia in the setting of DEIDRE  Improved  Now low, replace prn    5. Hypertension  BP target <130/80  Lisinopril on hold  On metoprolol, amlodipine    6. Hypernatremia  Secondary to prolonged n.p.o. state  Current Na 149  Adjust fw in tf    7. Anemia  Nl vikas studies, folate, B12  Transfuse <7.0  Status post 1 unit packed red cells  Start miguelito    D/w Dr Olinda Salcedo MD

## 2024-04-20 NOTE — PLAN OF CARE
Problem: Safety - Adult  Goal: Free from fall injury  Outcome: Progressing     Problem: Discharge Planning  Goal: Discharge to home or other facility with appropriate resources  Outcome: Progressing     Problem: Skin/Tissue Integrity  Goal: Absence of new skin breakdown  Description: 1.  Monitor for areas of redness and/or skin breakdown  2.  Assess vascular access sites hourly  3.  Every 4-6 hours minimum:  Change oxygen saturation probe site  4.  Every 4-6 hours:  If on nasal continuous positive airway pressure, respiratory therapy assess nares and determine need for appliance change or resting period.  4/19/2024 2115 by Chelo Portillo, RN  Outcome: Progressing     Problem: Confusion  Goal: Confusion, delirium, dementia, or psychosis is improved or at baseline  Description: INTERVENTIONS:  1. Assess for possible contributors to thought disturbance, including medications, impaired vision or hearing, underlying metabolic abnormalities, dehydration, psychiatric diagnoses, and notify attending LIP  2. [place] high risk fall precautions, as indicated  3. Provide frequent short contacts to provide reality reorientation, refocusing and direction  4. Decrease environmental stimuli, including noise as appropriate  5. Monitor and intervene to maintain adequate nutrition, hydration, elimination, sleep and activity  6. If unable to ensure safety without constant attention obtain sitter and review sitter guidelines with assigned personnel  7. Initiate Psychosocial CNS and Spiritual Care consult, as indicated  4/19/2024 2115 by Chelo Portillo, RN  Outcome: Progressing     Problem: Safety - Medical Restraint  Goal: Remains free of injury from restraints (Restraint for Interference with Medical Device)  Description: INTERVENTIONS:  1. Determine that other, less restrictive measures have been tried or would not be effective before applying the restraint  2. Evaluate the patient's condition at the time of restraint

## 2024-04-20 NOTE — PROGRESS NOTES
Patient was extubated to 2 liters/min via nasal cannula. Breath Sounds post extubation were dimished. Stridor was not present post extubation. SPO2 was 98%.      Performed by  Maday Brown RCP

## 2024-04-20 NOTE — PROGRESS NOTES
HOSPICE [facility]    Consult received. Chart reviewed. Visited patient at bedside. Nurse is removing bi pap and medicating. Wife Janet at bedside. She wants to leave the room. Went down to waiting area.     The hospice benefit and philosophy were explained including that hospice is end of life care in which, per Medicare, a patient has a terminal diagnosis that life expectancy would be 6 months or less. Explained that once in hospice care, all aggressive treatments would be stopped and allow nature to takes its course with focus on comfort care for the patient.  Explained hospice services at home, at [facility] with room/board private pay unless patient has Medicaid and the [facility] for short-term symptom management and respite.    Janet does not want the Hospice House, it is too far for her to travel. She would want a facility close to home. She understands that Virgilio's condition is poor. Explained that once the bi pap is removed, he may pass. Emotional support given. Went back to visit patient and he is having agonal breathing after bi pap removed. Updated nurse. Spoke with primary physician. [facility] will continue to follow. Please call with any questions. Thank you for the consult.     Electronically signed by Renee Galaviz RN on 4/20/2024 at 1:22 PM  [phone]; [phone]

## 2024-04-20 NOTE — PROGRESS NOTES
[facility]  Department of Internal Medicine   Internal Medicine Residency   MICU Progress Note    Patient:  Virgilio Sanchez 83 y.o. male  MRN: [id number]     Date of Service: 4/20/2024    Allergy: Patient has no known allergies.    Overnight Events: No significant events overnight.    Subjective     Patient was extubated to BiPAP.    ROS: Denies fever, chills, chest pain, shortness of breath, N/V/C/D, dysuria or blood in stool or urine.    Objective     VS:   Patient Vitals for the past 12 hrs:   BP Temp Temp src Pulse Resp SpO2   04/20/24 1007 (!) 113/56 -- -- -- -- --   04/20/24 1000 (!) 113/56 -- -- 87 30 98 %   04/20/24 0900 (!) 105/56 -- -- (!) 111 (!) 34 95 %   04/20/24 0821 -- -- -- (!) 117 23 93 %   04/20/24 0814 (!) 143/63 -- -- -- -- --   04/20/24 0800 (!) 143/63 97.3 °F (36.3 °C) Temporal (!) 116 23 96 %   04/20/24 0700 133/66 -- -- (!) 123 26 93 %   04/20/24 0600 139/74 -- -- (!) 106 27 96 %   04/20/24 0500 133/64 -- -- 89 30 96 %   04/20/24 0400 (!) 144/63 98.4 °F (36.9 °C) Temporal 87 28 92 %   04/20/24 0337 -- -- -- 82 30 93 %   04/20/24 0300 (!) 128/59 -- -- 83 (!) 31 93 %   04/20/24 0200 136/64 -- -- 88 29 90 %   04/20/24 0100 130/60 -- -- 84 29 91 %   ]    I & O - 24hr:   Intake/Output Summary (Last 24 hours) at 4/20/2024 1221  Last data filed at 4/20/2024 0648  Gross per 24 hour   Intake 657.74 ml   Output 935 ml   Net -277.26 ml       Net IO Since Admission: 6,157.33 mL [04/20/24 1221]    Sedatives: None    Pressors: None    Oxygen: BiPAP  transitioned to nasal cannula.    ABG:       Recent Labs     04/18/24  0438 04/19/24  0422 04/19/24  1014   PH 7.484* 7.436 7.462*   PCO2 34.8* 37.9 34.5*   PO2 85.3 85.0 87.0   HCO3 25.6 24.9 24.1   BE 2.1 0.8 0.5   O2SAT 96.4 95.9 96.5       Physical Exam:  General Appearance: No acute distress. On Bipap.  Neuro: Round symmetric pupil that react to light bilaterally.   Cardiovascular: regular rate and rhythm, S1 and S2 heard, no murmurs

## 2024-04-21 LAB
MICROORGANISM SPEC CULT: NORMAL
MICROORGANISM SPEC CULT: NORMAL
SERVICE CMNT-IMP: NORMAL
SERVICE CMNT-IMP: NORMAL
SPECIMEN DESCRIPTION: NORMAL
SPECIMEN DESCRIPTION: NORMAL

## 2024-04-21 NOTE — PROGRESS NOTES
This nurse, Chelo Portillo, notified:   Pt's spouse Janet Sanchez at 2042  [employer] at 2045: placed body on hold  's office at 2220:  Released body

## 2024-04-21 NOTE — PROGRESS NOTES
Physician Progress Note      PATIENT:               SANCHEZ ENRIQUEZ  CSN #:                  [id number]  :                       1940  ADMIT DATE:       2024 2:16 PM  DISCH DATE:        2024 8:31 PM  RESPONDING  PROVIDER #:        Jef Altamirano MD          QUERY TEXT:    Pt admitted with respiratory distress, SOB.  Noted documentation of Sepsis on   23 per RRT, Palliative, and pulmonology consults. If possible, please   document in progress notes and discharge summary:    The medical record reflects the following:  Risk Factors: Acute hypoxic hypercapnic respiratory failure, Left upper lobe   pneumonia, COPD exacerbation, tachycardia, leukocytosis  Clinical Indicators: Palliative consult - Pertinent Hospital Diagnoses:   Sepsis. Acute hypoxic hypercapnic respiratory failure. Left upper lobe   pneumonia. COPD exacerbation. Pulmonology consult- - Sepsis, GPC   bacteremia.  RRT-Acute encephalopathy multifactorial, CO2 retention and   sepsis, currently on antibiotic, has leukocytosis.  Treatment:  Bronchodilators, Pulmicort, Steroids, IV abx, Supplemental 02,   CXR, consults, monitoring, s/p intubation w/mechanical ventilation,   Bronchodilators, Pulmicort, Steroids    Thank you,  Hazel Davis RN  Clinical Documentation Improvement  [phone]  Options provided:  -- Sepsis confirmed present on admission  -- Sepsis confirmed not present on admission  -- Sepsis ruled out  -- Other - I will add my own diagnosis  -- Disagree - Not applicable / Not valid  -- Disagree - Clinically unable to determine / Unknown  -- Refer to Clinical Documentation Reviewer    PROVIDER RESPONSE TEXT:    The diagnosis of Sepsis was confirmed as present on admission.    Query created by: Hazel Davis on 4/15/2024 10:18 AM      Electronically signed by:  Jef Altamirano MD 2024 1:23 PM

## 2024-04-21 NOTE — PROGRESS NOTES
Pt's body has been released by [facility]. [facility] has been notified for .

## 2024-04-21 NOTE — PROGRESS NOTES
Pt went into asystole at 2031.This nurse, Chelo Portillo, and Vanessa Caro pronounced the pt dead. Each nurse assessed for the absence of the pt's heart rate, breath sounds, pulse and blood pressure.

## 2024-04-22 NOTE — DISCHARGE SUMMARY
[facility]              [address] [place] [address]                            DISCHARGE SUMMARY      PATIENT NAME: SANCHEZ ENRIQUEZ                : 1940  MED REC NO: [id number]                        ROOM: 4405  ACCOUNT NO: [id number]                       ADMIT DATE: 2024  PROVIDER: Jef Altamirano MD      HOSPITAL COURSE:  The patient is an 83-year-old gentleman with history of severe COPD and chronic hypoxic respiratory failure, coronary artery disease with stenting, previous severe GI bleeding in 2018 requiring embolization of the gastroduodenal artery, and poor medical compliance with followup, who presents to the emergency room with acute respiratory distress, worsening over the last several days.  He was diagnosed with acute on chronic respiratory failure with a community-acquired pneumonia, left upper lobe, and acute kidney injury as well as acute encephalopathy.  The patient had significant hypoxic respiratory failure with hypercapnia.  He was admitted to the intermediate floor.  His course was complicated by increasing confusion secondary to encephalopathy with respiratory worsening distress.  RRT was called and BiPAP was instituted.  Consultation with Pulmonary Medicine was obtained.  He was treated initially with Rocephin and doxycycline.  He also had acute kidney injury with hyperkalemia.  Nephrology was consulted as well.  The patient had gram-positive cocci in blood cultures that were being not documented.  Consultation with Infectious Disease was placed at that time.  The patient had continued decline with increasing respiratory distress.  He was transferred to Intensive Care on 2024.  He was seen.  Vancomycin was stopped by Infectious Disease and Zosyn was continued for what appeared to be a strep bacteremia.  It was later felt that this was probably a contaminant.  The patient was intubated when Intensive Care treated